# Patient Record
Sex: FEMALE | Race: WHITE
[De-identification: names, ages, dates, MRNs, and addresses within clinical notes are randomized per-mention and may not be internally consistent; named-entity substitution may affect disease eponyms.]

---

## 2018-05-20 NOTE — ED GU-FEMALE
General


Chief Complaint:  -Female


Stated Complaint:  LOWER ABD PAIN


Nursing Triage Note:  


TO ROOM C/O LOW ABD PAIN ONSET THIS AM


Nursing Sepsis Screen:  No Definite Risk


Source:  patient


Exam Limitations:  no limitations





History of Present Illness


Date Seen by Provider:  May 20, 2018


Time Seen by Provider:  10:06


Initial Comments


The patient is a 28-year-old white female who presents with chief complaint of 

low abdominal pain.  The patient reports that this began early this morning.  

She states that this is the worst pain that she ever had.  It is both sided but 

worse on the right.  There is no past history of kidney stones.  She reports 

she had urinary tract infections when pregnant but not otherwise.


Timing/Duration:  this morning


Severity/Quality:  severe


Location:  suprapubic, right flank, left flank, generalized flank


Activities at Onset:  none





Allergies and Home Medications


Allergies


Coded Allergies:  


     No Known Drug Allergies (Unverified , 18)





Home Medications


No Active Prescriptions or Reported Meds





Patient Home Medication List


Home Medication List Reviewed:  Yes





Review of Systems


Constitutional:  see HPI


EENTM:  no symptoms reported


Respiratory:  no symptoms reported


Cardiovascular:  no symptoms reported


Gastrointestinal:  no symptoms reported


Genitourinary:  see HPI


Musculoskeletal:  no symptoms reported


Skin:  no symptoms reported


Psychiatric/Neurological:  No Symptoms Reported


Endocrine:  No Symptoms Reported


Hematologic/Lymphatic:  No Symptoms Reported





Past Medical-Social-Family Hx


Patient Social History


Alcohol Use:  Denies Use


Recreational Drug Use:  No


Smoking Status:  Never a Smoker


Recent Foreign Travel:  No


Contact w/Someone Who Travel:  No


Recent Infectious Disease Expo:  No





Past Medical History


Surgeries:  Yes


 Section


Respiratory:  No


Cardiac:  No


Neurological:  No


Genitourinary:  No


Gastrointestinal:  No


Musculoskeletal:  No


Endocrine:  No


HEENT:  No


Cancer:  No


Integumentary:  No





Physical Exam


Vital Signs





Vital Signs - First Documented








 18





 09:00


 


Temp 96.7


 


Pulse 72


 


Resp 18


 


B/P (MAP) 143/95 (111)


 


Pulse Ox 99


 


O2 Delivery Room Air





Capillary Refill : Less Than 3 Seconds


General Appearance:  mild distress


HEENT:  normal ENT inspection


Neck:  full range of motion


Cardiovascular:  normal peripheral pulses, regular rate, rhythm, no edema, no 

gallop, no JVD, no murmur


Respiratory:  chest non-tender


Gastrointestinal:  normal bowel sounds, non tender, no organomegaly, other (

obese)


Back:  normal inspection, no CVA tenderness, no vertebral tenderness, CVA 

tenderness (R), CVA tenderness (L)


Extremities:  normal range of motion, non-tender, normal inspection, no pedal 

edema, no calf tenderness, normal capillary refill, pelvis stable


Neurologic/Psychiatric:  CNs II-XII nml as tested, no motor/sensory deficits, 

alert, normal mood/affect, oriented x 3


Skin:  normal color, warm/dry, cyanosis, cool, diaphoresis, pallor


Lymphatic:  no adenopathy


Comments


Percussion over the right kidney produced rather prompt and impressive





Progress/Results/Core Measures


Suspected Sepsis


Recent Fever Within 48 Hours:  No


Infection Criteria Present:  None


New/Unexplained  Altered Menta:  No


Sepsis Screen:  No Definite Risk


SIRS


Temperature:96.7 


Pulse: 72 


Respiratory Rate: 18


 


Laboratory Tests


18 09:45: White Blood Count 9.8


Blood Pressure 143 /95 


Mean: 111


 


Laboratory Tests


18 09:45: Platelet Count 287


18 10:34: 


Creatinine 0.71, Total Bilirubin 0.9








Results/Orders


Lab Results





Laboratory Tests








Test


 18


09:25 18


09:45 18


10:34 Range/Units


 


 


Urine Color YELLOW     


 


Urine Clarity CLEAR     


 


Urine pH 5    5-9  


 


Urine Specific Gravity 1.030 H   1.016-1.022  


 


Urine Protein 2+ H   NEGATIVE  


 


Urine Glucose (UA) NEGATIVE    NEGATIVE  


 


Urine Ketones 4+ H   NEGATIVE  


 


Urine Nitrite NEGATIVE    NEGATIVE  


 


Urine Bilirubin 1+ H   NEGATIVE  


 


Urine Urobilinogen NORMAL    NORMAL  MG/DL


 


Urine Leukocyte Esterase 2+ H   NEGATIVE  


 


Urine RBC (Auto) 5+ H   NEGATIVE  


 


Urine RBC 5-10 H    /HPF


 


Urine WBC 2-5     /HPF


 


Urine Squamous Epithelial


Cells 10-25 H


 


 


  /HPF





 


Urine Crystals NONE     /LPF


 


Urine Bacteria MODERATE H    /HPF


 


Urine Casts NONE     /LPF


 


Urine Mucus SMALL H    /LPF


 


Urine Culture Indicated NO     


 


White Blood Count


 


 9.8 


 


 4.3-11.0


10^3/uL


 


Red Blood Count


 


 4.61 


 


 4.35-5.85


10^6/uL


 


Hemoglobin  13.7   11.5-16.0  G/DL


 


Hematocrit  40   35-52  %


 


Mean Corpuscular Volume  87   80-99  FL


 


Mean Corpuscular Hemoglobin  30   25-34  PG


 


Mean Corpuscular Hemoglobin


Concent 


 34 


 


 32-36  G/DL





 


Red Cell Distribution Width  13.2   10.0-14.5  %


 


Platelet Count


 


 287 


 


 130-400


10^3/uL


 


Mean Platelet Volume  9.6   7.4-10.4  FL


 


Neutrophils (%) (Auto)  80 H  42-75  %


 


Lymphocytes (%) (Auto)  15   12-44  %


 


Monocytes (%) (Auto)  5   0-12  %


 


Eosinophils (%) (Auto)  0   0-10  %


 


Basophils (%) (Auto)  0   0-10  %


 


Neutrophils # (Auto)  7.8   1.8-7.8  X 10^3


 


Lymphocytes # (Auto)  1.5   1.0-4.0  X 10^3


 


Monocytes # (Auto)  0.5   0.0-1.0  X 10^3


 


Eosinophils # (Auto)


 


 0.0 


 


 0.0-0.3


10^3/uL


 


Basophils # (Auto)


 


 0.0 


 


 0.0-0.1


10^3/uL


 


Sodium Level   138  135-145  MMOL/L


 


Potassium Level   3.7  3.6-5.0  MMOL/L


 


Chloride Level   107    MMOL/L


 


Carbon Dioxide Level   19 L 21-32  MMOL/L


 


Anion Gap   12  5-14  MMOL/L


 


Blood Urea Nitrogen   12  7-18  MG/DL


 


Creatinine


 


 


 0.71 


 0.60-1.30


MG/DL


 


Estimat Glomerular Filtration


Rate 


 


 > 60 


  





 


BUN/Creatinine Ratio   17   


 


Glucose Level   130 H   MG/DL


 


Calcium Level   9.1  8.5-10.1  MG/DL


 


Total Bilirubin   0.9  0.1-1.0  MG/DL


 


Aspartate Amino Transf


(AST/SGOT) 


 


 14 


 5-34  U/L





 


Alanine Aminotransferase


(ALT/SGPT) 


 


 11 


 0-55  U/L





 


Alkaline Phosphatase   54    U/L


 


Total Protein   7.1  6.4-8.2  GM/DL


 


Albumin   4.0  3.2-4.5  GM/DL








My Orders





Orders - MELVIN IVEY MD


Urine Pregnancy Bedside (18 09:15)


Cbc With Automated Diff (18 09:15)


Ua Culture If Indicated (18 09:15)


Comprehensive Metabolic Panel (18 10:02)


Ct Abd/Pelvis Wo(Kidney Stone) (18 10:02)


Ketorolac Injection (Toradol Injection) (18 10:15)


Ondansetron Injection (Zofran Injectio (18 10:15)





Medications Given in ED





Current Medications








 Medications  Dose


 Ordered  Sig/Elaine


 Route  Start Time


 Stop Time Status Last Admin


Dose Admin


 


 Ketorolac


 Tromethamine  30 mg  ONCE  ONCE


 IVP  18 10:15


 18 10:16 DC 18 10:25


30 MG


 


 Ondansetron HCl  8 mg  ONCE  ONCE


 IVP  18 10:15


 18 10:16 DC 18 10:25


8 MG








Vital Signs/I&O











 18





 09:00


 


Temp 96.7


 


Pulse 72


 


Resp 18


 


B/P (MAP) 143/95 (111)


 


Pulse Ox 99


 


O2 Delivery Room Air





Capillary Refill : Less Than 3 Seconds








Blood Pressure Mean:  111











Point of Care Testing


Urine Pregnancy-Bedside:  Negative





Departure


Impression





 Primary Impression:  


 right ureterolithiasis


Disposition:   HOME, SELF-CARE


Condition:  Stable/Unchanged





Departure-Patient Inst.


Decision time for Depature:  11:28





Add. Discharge Instructions:  


All discharge instructions reviewed with patient and/or family. Voiced 

understanding.


Strain all urine.


Medication as prescribed.


Call your provider in a.m. if stone has not passed.  He can arrange for you to 

see a urologist.


Scripts


Hydrocodone Bit/Acetaminophen (LORTAB  7.5 MG TABLET) 1 Ea Tablet


1 EA PO 4 times a day, #14 TAB


   Prov: MELVIN IVEY MD         18











MELVIN IVEY MD May 20, 2018 10:10

## 2018-05-20 NOTE — DIAGNOSTIC IMAGING REPORT
Clinical indication: Patient with right lower quadrant pain

radiating to posteriorly with nausea vomiting and diarrhea x6

hours.



Exam: CT exam of the abdomen and pelvis is performed without IV

or oral contrast using stone protocol. Coronal and sagittal

reformatted images are created.



Comparisons: None.



Findings:



Visualized lung bases: Unremarkable.



Liver: Unremarkable as visualized.

Gallbladder: Unremarkable.

Pancreas: Unremarkable as visualized.

Spleen: Unremarkable as visualized.



Adrenal glands: Unremarkable.

Kidneys/ ureters: There is a 2 mm calcification in the medial

lower right pelvis region which is in the expected region of the

distal right ureter. This may represent a stone in the distal

right ureter versus an adjacent phlebolith.



There is note of moderate right hydroureteronephrosis and mild

fat stranding adjacent to the right ureter. Otherwise both

kidneys are unremarkable. There are no other urinary tract stones

seen. There is no perinephric fat stranding.



Aorta: Unremarkable as visualized.



Intraabdominal/ retroperitoneal contents: Unremarkable.

Intestines: Unremarkable as visualized.

Appendix: Unremarkable.



Bladder: Unremarkable as visualized.

Pelvic organs: There is a roughly 2.6 cm cystic structure in

right adnexa region. Otherwise visualized portion of the uterus

and adnexal areas is unremarkable. There is no significant free

fluid in the pelvis. There is no intra-abdominal free air.

Extra abdominal/ pelvis regions: Unremarkable.



Abdominal wall: Small fat-containing umbilical hernia seen.



Bones: Unremarkable.



Impression: 

1:  There is a 2 mm calcification in the medial right low pelvis

region which is in the expected region of the distal right

ureter. This may represent a stone in the distal right ureter.

There is note of moderate right hydroureteronephrosis and mild

periureteral fat stranding.



2:  There is a roughly 2.6 cm cyst in right adnexa region. Pelvic

ultrasound in 6 weeks is suggested to evaluate for evolution and

further evaluate.



Dictated by: 



  Dictated on workstation # [id number]

## 2018-11-29 NOTE — XMS REPORT
MU2 Ambulatory Summary

 Created on: 2016



Radha Aviles

External Reference #: [id number]

: 1990

Sex: Female



Demographics







 Address  [address]

[place]  [address]

 

 Home Phone  [phone]

 

 Preferred Language  English

 

 Marital Status  Never 

 

 Sabianism Affiliation  Unknown

 

 Race  White

 

 Ethnic Group  Not  or 





Author







 Author  Velma Mendieta

 

 [facility]

 

 Address  [address]

[place], [place]  [address]



 

 Phone  [phone]







Care Team Providers







 Care Team Member Name  Role  Phone

 

 Velma Mendieta  PCP  Unavailable







Allergies and Adverse Reactions







 Name  Reaction  Notes

 

 NO KNOWN DRUG ALLERGIES      







Plan of Treatment







 Planned Activity  Comments  Planned Date  Planned Time  Plan/Goal

 

 CYTOPATH C/V THIN LAYER     2016  12:00 AM   

 

 N.GONORRHOEAE DNA AMP PROB     2016  12:00 AM   

 

 CHLAMYDIA CULTURE     2016  12:00 AM   

 

 HIV-1ANTIBODY     2016  12:00 AM   

 

 URINALYSIS AUTO W/SCOPE     2016  12:00 AM   

 

 OBSTETRIC PANEL     2016  12:00 AM   

 

 GLUCOSE TOLERANCE TEST (GTT)     2016  12:00 AM   







Medications







  

 

 Name  Start Date  Expiration Date  SIG  Comments

 

 Nata[place] 60 mg iron-1 mg oral tablet  4/15/2010  3/11/2011  take 1 tablet by 
oral route once daily for 30 days   

 

 Diflucan 150 mg oral tablet  2010  take 1 tablet (150 mg) by 
oral route once  for 1 days   

 

 Premarin 1.25 mg oral tablet  2011  take 1 tablet by oral route 
daily for 10 days   

 

 doxycycline hyclate 100 mg oral tablet  7/15/2011  2011  take 1 tablet (
100 mg) by oral route every 12 hours for 10 days   

 

 ibuprofen 800 mg oral tablet  2012  take 1 tablet by oral route 
3 times a day for 30 days   

 

 Bactrim -160 mg oral tablet  2012  take 1 tablet by oral 
route every 12 hours for 7 days   









 Discontinued 

 

 Name  Start Date  Discontinued Date  SIG  Comments

 

 atenolol 25 mg oral tablet     2011  take 1/2 tablet by oral route QD   

 

 Medrol (Antwan) 4 mg oral tablets,dose pack  2012  3/26/2012  take as 
directed   







Problem List







 Description  Status  Onset

 

 Prenatal Care, High Risk Pregnancy  Active  2010







Vital Signs







 Date  Time  BP-Sys(mm[Hg]  BP-Marisa(mm[Hg])  HR(bpm)  RR(rpm)  Temp  WT  HT  HC  
BMI  BSA  BMI Percentile  O2 Sat(%)

 

 2016  11:15:00 AM  130 mmHg  70 mmHg  80 bpm  20 rpm     214 lbs  64 in  
   36.73 kg/m2  2.09 m2      

 

 3/26/2012  10:30:00 AM  126 mmHg  62 mmHg  106 bpm  18 rpm  96.8 F  205 lbs  
64 in     35.1878 kg/m  2.0491 m     97 %

 

 2012  1:22:00 PM  120 mmHg  68 mmHg  66 bpm  18 rpm  97 F  202.375 lbs  
64 in     34.74 kg/m2  2.04 m2      

 

 2012  1:44:00 PM  122 mmHg  64 mmHg  68 bpm  18 rpm  98.2 F  198.125 lbs  
64 in     34.0077 kg/m  2.0145 m      

 

 1/3/2012  9:40:00 AM  103 mmHg  77 mmHg  64 bpm  18 rpm  97.7 F  197.125 lbs  
64 in     33.84 kg/m2  2.01 m2      

 

 2011  9:06:00 AM  118 mmHg  68 mmHg  74 bpm     97.4 F  189 lbs          
        

 

 7/15/2011  10:06:00 AM  126 mmHg  70 mmHg  73 bpm  20 rpm  97.9 F  180.5 lbs  
64 in     30.98 kg/m2  1.92 m2  0 %  99 %

 

 2011  8:56:00 AM  96 mmHg  68 mmHg  66 bpm  20 rpm  97.5 F  177.5 lbs  64 
in     30.4675 kg/m  1.9067 m  0 %  98 %

 

 12/15/2010  10:43:00 AM  104 mmHg  64 mmHg  48 bpm     97.8 F  150 lbs        
          

 

 2010  9:15:00 AM  112 mmHg  72 mmHg  49 bpm     98 F  149 lbs  64 in     
25.5755 kg/m  1.747 m  81.5 %   

 

 2010  8:54:00 AM  117 mmHg  65 mmHg  73 bpm  20 rpm  97.8 F  160.5 lbs   
               

 

 2010  9:42:00 AM  134 mmHg  66 mmHg  66 bpm        161 lbs               
   

 

 4/15/2010  4:02:00 PM  135 mmHg  71 mmHg  77 bpm        162 lbs  64 in     
27.81 kg/m2  1.82 m2  89.9 %   







Social History







 Name  Description  Comments

 

 History of tobacco usage     Quit smoking in January - was smoking 1-2cig/day

 

 denies alcohol use      

 

 Tobacco  Current every day smoker   







History of Procedures







 Date Ordered  Description  Order Status

 

 2011 12:00 AM  URINE PREGNANCY TEST  Reviewed

 

 2011 12:00 AM  IMMUNIZATION ADMIN  Reviewed

 

 2011 12:00 AM  HPV VACCINE 4 VALENT IM  Reviewed

 

 2012 12:00 AM  X-RAY EXAM KNEE 4 OR MORE  Returned

 

 4/15/2010 12:00 AM  OBSTETRIC PANEL  Reviewed

 

 4/15/2010 12:00 AM  HIV-1ANTIBODY  Reviewed

 

 4/15/2010 12:00 AM  URINALYSIS NONAUTO W/SCOPE  Reviewed

 

 4/15/2010 12:00 AM  OB US < 14 WKS SINGLE FETUS  Reviewed

 

 2010 12:00 AM  GLUCOSE TEST  Reviewed

 

 2010 12:00 AM  Rhogam  Reviewed

 

 2010 12:00 AM  Type and screen  Reviewed

 

 2010 12:00 AM  COMPLETE CBC W/AUTO DIFF WBC  Reviewed

 

 2010 12:00 AM  COMPLETE CBC W/AUTO DIFF WBC  Reviewed

 

 10/14/2010 12:00 AM  CULTURE OTHR SPECIMN AEROBIC  Reviewed

 

 12/15/2010 12:00 AM  INSERTION IMPLANON, IMPLANTABLE CONTRACEPTIVE CAPSULES  
Reviewed

 

 12/15/2010 12:00 AM  Etonogestrel (contraceptive) implant system, (Implanon)  
Reviewed

 

 7/15/2011 12:00 AM  CYTOPATH TBS C/V MANUAL  Reviewed

 

 7/15/2011 12:00 AM  CHLAMYDIA CULTURE  Reviewed

 

 7/15/2011 12:00 AM  N.GONORRHOEAE DNA AMP PROB  Reviewed

 

 7/15/2011 12:00 AM  URINE PREGNANCY TEST  Reviewed

 

 2011 12:00 AM  IMMUNIZATION ADMIN  Reviewed

 

 2011 12:00 AM  HPV VACCINE 4 VALENT IM  Reviewed







Results Summary







 Data and Description  Results

 

 2010 10:41 AM  RUBELLA 34.0 IU/mLCOLOR YELLOW APPEARANCE HAZY SPEC GRAV 
1.025 pH 7.0 PROTEIN TRACE GLUCOSE NEGATIVE KETONE 15 BILIRUBIN NEGATIVE BLOOD 
NEGATIVE NITRITE NEGATIVE LEUK SCREEN NEGATIVE CASTS/LPF NEGATIVE CRYSTALS 
TRACE AMORPH MUCOUS THRDS FEW BACTERIA NEGATIVE EPITH CELLS FEW SQUAMOUS 
TRICHOMONAS NEGATIVE YEAST NEGATIVE WBC 6.3 RBC 4.57 HGB 13.90 g/dLHCT 40.30 %
MCV 88.0 fLMCH 30.40 pgMCHC 34.50 g/dLMPV 9.20 fLPLT 258 %NEUT 65.10 %%LYMP 
28.30 %#NEUT 4.10 #LYMP 1.80 

 

 2010 3:48 PM  WBC 12.9 RBC 4.02 HGB 12.60 g/dLHCT 37.40 %MCV 93.0 fLMCH 
31.30 pgMCHC 33.70 g/dLRDW CV 14.10 %MPV 9.50 fLPLT 263 %NEUT 69.20 %%LYMP 
22.30 %%MONO 7.60 %%EOS 0.70 %%BASO 0.20 %#NEUT 8.91 #LYMP 2.88 #MONO 0.98 #EOS 
0.09 #BASO 0.03 

 

 10/22/2010 8:21 PM  COLOR YELLOW APPEARANCE CLEAR SPEC GRAV 1.010 pH 6.5 
PROTEIN NEGATIVE GLUCOSE NEGATIVE KETONE 15 BILIRUBIN NEGATIVE BLOOD NEGATIVE 
NITRITE NEGATIVE LEUK SCREEN SMALL CASTS/LPF NEGATIVE CRYSTALS NEGATIVE MUCOUS 
THRDS NEGATIVE BACTERIA 2++ EPITH CELLS 3+++ SQUAMOUS TRICHOMONAS NEGATIVE 
YEAST NEGATIVE 

 

 2010 11:58 PM  COLOR YELLOW APPEARANCE CLEAR SPEC GRAV 1.015 pH 6.5 
PROTEIN NEGATIVE GLUCOSE NEGATIVE KETONE NEGATIVE BILIRUBIN NEGATIVE BLOOD 
NEGATIVE NITRITE NEGATIVE LEUK SCREEN LARGE CASTS/LPF NEGATIVE CRYSTALS 
NEGATIVE MUCOUS THRDS NEGATIVE BACTERIA 2++ EPITH CELLS 2++ SQUAMOUS 
TRICHOMONAS NEGATIVE YEAST NEGATIVE 

 

 11/15/2010 5:30 PM  .0 IU/LURIC ACID 3.7 mg/dLGLUCOSE 66.0 mg/dLSODIUM 
137.0 mmol/LPOTASSIUM 3.10 mmol/LCHLORIDE 105.0 mmol/LCO2 21.0 mmol/LBUN 3.0 mg/
dLCREATININE 0.50 mg/dLSGOT/AST 20.0 IU/LSGPT/ALT 18.0 IU/LALK PHOS 152.0 IU/
LTOTAL PROTEIN 6.20 g/dLALBUMIN 3.40 g/dLTOTAL BILI 0.60 mg/dLCALCIUM 9.10 mg/
dLeGFR >60 mL/min/1.73 m2WBC 16.9 RBC 4.18 HGB 13.30 g/dLHCT 39.70 %MCV 95.0 
fLMCH 31.80 pgMCHC 33.50 g/dLRDW CV 14.0 %MPV 10.10 fLPLT 268 %NEUT 75.70 %%
LYMP 17.50 %%MONO 6.20 %%EOS 0.40 %%BASO 0.20 %#NEUT 12.82 #LYMP 2.97 #MONO 
1.05 #EOS 0.06 #BASO 0.03 

 

 11/15/2010 8:50 PM  PROTEIN UR 14.0 mg/dL

 

 2016 12:21 PM  Pap Smear Collected 







History Of Immunizations







 Name  Date Admin  Mfg Name  Mfg Code  Trade Name  Lot#  Route  Inj  Vis Given  
Vis Pub  CVX

 

 HPV  2011  [employer]  MSD  GARDASIL  [id number]  Intramuscular  Left 
Deltoid  2011  999

 

 HPV  2011  [employer]  MSD  GARDASIL  [id number]  Intramuscular  Left 
Deltoid  2011  62







History of Past Illness







 Name  Date of Onset  Comments

 

 Atrial Septal Defect, Ostium Secundum Type      

 

 Prenatal Care, High Risk Pregnancy  2010   

 

    Apr 15 2010  4:29PM   

 

 Prenatal Care, High Risk Pregnancy  Apr 15 2010  4:13PM   

 

    2010  9:54AM   

 

 Prenatal Care, High Risk Pregnancy  2010 10:10AM   

 

    May 13 2010  9:01AM   

 

    John 10 2010  2:45PM   

 

    2010  4:16PM   

 

 Pregnancy, First Normal  Aug 30 2010  9:14AM   

 

 Pregnancy, High Risk  Sep 13 2010  3:13PM   

 

 Group B Strep Screening,   Oct 14 2010  5:43PM   

 

 Pregnancy, High Risk  Oct 14 2010  5:43PM   

 

 Postoperative Examination Following Surgery  Dec  6 2010  9:15AM   

 

 IMPLANON  Insertion  Dec 15 2010 10:44AM   

 

 General Medical Exam, Child  2011  8:58AM   

 

 Contraceptive Management  2011  8:58AM   

 

 Routine gynecological examination  Jul 15 2011 10:03AM   

 

 Contraceptive Counseling  Jul 15 2011 10:03AM   

 

 Vaginal Discharge  Jul 15 2011 10:03AM   

 

 Irregular Menses  Jul 15 2011 10:03AM   

 

 Gardsil (HPV)  2011  8:54AM   

 

 Irregular Menses  Aug 31 2011  9:07AM   

 

 Family Planning  Aug 31 2011  9:07AM   

 

 Contraceptive Management  Andreas  3 2012  9:43AM   

 

 Local Infection  Andreas  3 2012  9:43AM   

 

 Well Child Examination  2012  1:46PM   

 

 Right knee pain  2012  1:46PM   

 

 Right knee pain  2012  1:24PM   

 

 Abscess  2012  1:24PM   

 

 Right knee pain  Mar 26 2012 10:32AM   

 

 General Medical Exam, Adult  2012  1:46PM   

 

 Pregnancy test confirmed positive  2016 11:37AM   

 

 Obesity  2016 11:37AM   







Payers







 Insurance Name  Company Name  Plan Name  Plan Number  Policy Number  Policy 
Group Number  Start Date

 

    [employer] [facility]     
[id number]     N/A

 

    [facility]     N/A







History of Encounters







 Visit Date  Visit Type  Provider

 

 2016  Office visit  Velma Mendieta APRN

 

 3/26/2012  Office visit  Ivanna Swan APRN

 

 2012  Office visit  Ivanna Swan APRN

 

 2012  Office visit  Ivanna Swan APRN

 

 1/3/2012  Office visit  Ivanna Swan APRN

 

 2011  Nurse visit  Ivanna Swan APRN

 

 2011  Nurse visit  Ivanna Swan APRN

 

 7/15/2011  Office visit  Ivanna Swan APRN

 

 2011  Office visit  Ivanna Swan APRN

 

 12/15/2010  Office visit  Willard Sorensen MD

 

 2010  Surgery  Willard Sorensen MD

 

 2010  [facility]  Willard Sorensen MD

 

 11/15/2010  Office visit  Willard Sorensen MD

 

 11/15/2010  [facility]  Willard Sorensen MD

 

 2010  Office visit  Willard Sorensen MD

 

 2010  Office visit  Willard Sorensen MD

 

 10/28/2010  Office visit  Willard Sorensen MD

 

 10/21/2010  Office visit  Willard Sorensen MD

 

 10/14/2010  Office visit  Willard Sorensen MD

 

 10/9/2010  [facility]  Willard Sorensen MD

 

 10/7/2010  Office visit  Willard Sorensen MD

 

 2010  Office visit  Willard Sorensen MD

 

 2010  Office visit  Willard Sorensen MD

 

 2010  Office visit  Willard Sorensen MD

 

 2010  Office visit  Willard Sorensen MD

 

 2010  Office visit  Willard Sorensen MD

 

 6/10/2010  Office visit  Willard Sorensen MD

 

 2010  Office visit  Willard Sorensen MD

 

 2010  Office visit  Willard Sorensen MD

 

 4/15/2010  Office visit  Willard Sorensen MD

## 2018-11-29 NOTE — XMS REPORT
Continuity of Care Document

 Created on: 2015



RADHA AVILES

External Reference #: [id number]

: 1990

Sex: Female



Demographics







 Address  [address] [place]  [address]

 

 Home Phone  [phone]

 

 Preferred Language  Unknown

 

 Marital Status  

 

 Taoist Affiliation  Unknown

 

 Race  White

 

 Ethnic Group  Unknown





Author







 Author  [facility]

 

 Organization  [facility]

 

 Address  [facility]

 [address]  [address]



 

 Phone  Unavailable







Support







 Name  Relationship  Address  Phone

 

 DRE PENA MD  Caregiver  [address] [place] [address]  [phone]  Unavailable

 

 KAMALJIT AVILES  Next Of Kin  [address]  [address] [phone]







Insurance Providers







 Payer Name  Policy Number  Subscriber Name  Relationship

 

 Medicaid [place]  [id number]  Radha Aviles  18 Self / Same As Patient







Advance Directives







 Directive  Response  Recorded Date/Time

 

 Advance Directives  No  02/20/15 7:18am

 

 Living Will  No  02/20/15 7:18am

 

 Health Care Proxy  No  07/04/15 1:25pm

 

 Power of  for Health Care  No  02/20/15 7:18am

 

 Who is designated as your agent/rep. to act on your behalf?  PT  02/22/15 10:
14am

 

 Organ, Tissue, or Eye Donor  No  02/20/15 7:17am

 

 Do you have a signed organ donor card?  No  02/20/15 7:17am







Chief Complaint and Reason for Visit







 Chief Complaint  ABDOMINAL PAIN

 

 Reason for Visit  [id number]







Problems

Active Problems







 Medical Problem  Onset Date  Status

 

 Renal stone  Unknown  Acute

 

 Spontaneous   Unknown  Acute

 

 Spontaneous   Unknown  Acute

 

 Spontaneous   Unknown  Acute

 

 UTI (lower urinary tract infection)  Unknown  Acute







Medications

Current Home Medications







 Medication  Dose  Units  Route  Directions  Days/Qty  Instructions  Start Date

 

 Folic Acid 1 Mg  1  Mg  Oral  Daily        02/20/15

 

 Pnv With Ca,No.74/Iron/Fa 1 Each  1  Each  Oral           02/20/15

 

 Cephalexin Monohydrate 500 Mg  1,000  Mg  Oral  Twice A Day  40     02/20/15

 

 Acetaminophen/ Codeine #3 Tab* 1 Tab  1  Ea  Oral  Every 6 Hrs As Needed For 
Pain for Pain  15     02/22/15

 

 Acetaminophen/Hydrocodone Bitart (Lortab 5-325*) 1 Tab  1  Each  Oral  Every 4-
6 Hrs As Needed Pain as needed for Pain  20     07/04/15

 

 Tamsulosin Hcl 0.4 Mg  0.4  Mg  Oral  Daily  20     07/04/15

 

 Ondansetron* 4 Mg/Tab  4  Mg  Oral  Every 4-6 Hours As Needed as needed for 
Nausea  30     07/04/15







Social History







 Social History Problem  Response  Recorded Date/Time

 

 Smoking Status  Never smoker  2015 8:06am









 Query  Response  Start Date  Stop Date

 

 Smoking Status  Never smoker      







Hospital Discharge Instructions

No hospital discharge instructions.



Plan of Care







 Discharge Date  07/04/15 5:32pm

 

 Condition at Discharge  Stable

 

 Instructions/Education Provided  Kidney Stones (ED)

 

 Prescriptions  See Medication Section

 

 Referrals  [facility] - 







Functional Status

No functional status results.



Allergies, Adverse Reactions, Alerts

No known allergies.



Immunizations







 Name  Given  Type

 

 Hx Diphtheria, Pertussis, Tetanus Vaccination  Unknown  Historical

 

 Hx Influenza Vaccination  Y   Historical

 

 Hx Pneumococcal Vaccination  No  Historical







Vital Signs

Acute Vital Signs





 Vital  Response  Date/Time

 

 Pain Location Body Site Modifier  Upper  2015 4:50pm







Results

Laboratory Results







 Test Name  Result  Units  Flags  Reference  Collection Date/Time  Result Date/
Time  Comments

 

 White Blood Count  11.1  K/uL   H  4.8-10.8  2015 1:50pm  2015 2:
28pm   

 

 Red Blood Count  4.92  M/uL     4.20-5.40  2015 1:50pm  2015 2:
28pm   

 

 Hemoglobin  13.4  gm/dL     12.0-16.0  2015 1:50pm  2015 2:28pm   

 

 Hematocrit  41.1  %     37.0-47.0  2015 1:50pm  2015 2:28pm   

 

 Mean Corpuscular Volume  83.7  fL     81.0-99.0  2015 1:50pm  2015 
2:28pm   

 

 Mean Corpuscular Hemoglobin  27.3  pg     27.0-31.0  2015 1:50pm  2015 2:28pm   

 

 Mean Corpuscular Hemoglobin Concent  32.7  g/dL     30.0-37.0  2015 1:
50pm  2015 2:28pm   

 

 Red Cell Distribution Width  15.1  %   H  11.5-14.5  2015 1:50pm  2015 2:28pm   

 

 Platelet Count  282  K/uL     130-400  2015 1:50pm  2015 2:28pm   

 

 Mean Platelet Volume  9.7  fL     7.4-10.4  2015 1:50pm  2015 2:
28pm   

 

 Neutrophils (%) (Auto)  74.5  %     42.2-75.2  2015 1:50pm  2015 2:
28pm   

 

 Lymphocytes (%) (Auto)  18.9  %   L  20.5-51.1  2015 1:50pm  2015 2
:28pm   

 

 Monocytes (%) (Auto)  5.2  %     1.7-9.3  2015 1:50pm  2015 2:28pm
   

 

 Eosinophils (%) (Auto)  0.7  %     0-3  2015 1:50pm  2015 2:28pm   

 

 Basophils (%) (Auto)  0.7  %     0.0-1.0  2015 1:50pm  2015 2:28pm
   

 

 Neutrophils # (Auto)  8.3  K/uL   H  2.0-6.9  2015 1:50pm  2015 2:
28pm   

 

 Lymphocytes # (Auto)  2.1  K/uL     1.2-3.4  2015 1:50pm  2015 2:
28pm   

 

 Monocytes # (Auto)  0.6  K/uL     0.1-0.6  2015 1:50pm  2015 2:
28pm   

 

 Eosinophils # (Auto)  0.1  K/uL     0.0-0.7  2015 1:50pm  2015 2:
28pm   

 

 Basophils # (Auto)  0.1  K/uL     0.0-0.2  2015 1:50pm  2015 2:
28pm   

 

 Random Glucose  94  mg/dL       2015 2:35pm  2015 3:11pm   

 

 Blood Urea Nitrogen  16  mg/dL     7-18  2015 2:35pm  2015 3:11pm 
  

 

 Creatinine  1.2  mg/dL   H  0.6-1.0  2015 2:35pm  2015 3:11pm   

 

 Sodium Level  141  mEq/L     136-145  2015 2:35pm  2015 3:11pm   

 

 Potassium Level  3.5  mEq/L     3.5-5.0  2015 2:35pm  2015 3:11pm 
  

 

 Chloride Level  107  mEq/L       2015 2:35pm  2015 3:11pm   

 

 Carbon Dioxide Level  20.8  mEq/L   L  21-32  2015 2:35pm  2015 3:
11pm   

 

 Calcium Level  7.9  mg/dL   L  8.8-10.5  2015 2:35pm  2015 3:11pm 
  

 

 Total Protein  6.7  gm/dL     6.4-8.2  2015 2:35pm  2015 3:11pm   

 

 Albumin  3.6  gm/dL     3.4-5.0  2015 2:35pm  2015 3:11pm   

 

 Total Bilirubin  0.70  mg/dL     0.00-1.00  2015 2:35pm  2015 3:
11pm   

 

 Aspartate Amino Transf (AST/SGOT)  11  U/L   L  15-37  2015 2:35pm   3:11pm   

 

 Alanine Aminotransferase (ALT/SGPT)  15  U/L     12-78  2015 2:35pm  07/
2015 3:11pm   

 

 Total Alkaline Phosphatase  68  U/L       2015 2:2015 3
:11pm   

 

 Urine Color  YELLOW        YELLOW  2015 3:15pm  2015 3:32pm   

 

 Urine Appearance  CLEAR        CLEAR  2015 3:15pm  2015 3:32pm   

 

 Urine Glucose (UA)  NEGATIVE  mg/dL     NEGATIVE  2015 3:15pm  07/04/
2015 3:32pm   

 

 Urine Bilirubin  NEGATIVE        NEGATIVE  2015 3:2015 3:
32pm   

 

 Urine Ketones  1+ (15 mg/dl)  mg/dL   H  NEGATIVE  2015 3:15pm  07/04/
2015 3:32pm   

 

 Urine Specific Gravity  >=1.030      H  1.010-1.025  2015 3:15pm  2015 3:32pm   

 

 Urine Occult Blood  3+ (Large)      H  NEGATIVE  2015 3:15pm  2015 
3:32pm  URINE CULTURE ORDERED PER MEDICAL STAFF-APPROVED PROTOCOL 

FOR LAB. 

 

 Urine pH  5.5        5.0-8.0  2015 3:15pm  2015 3:32pm   

 

 Urine Protein  TRACE  mg/dL   H  NEGATIVE  2015 3:15pm  2015 3:
32pm   

 

 Urine Urobilinogen  0.2 mg/dL  E.U./dL     0.2-1.0  2015 3:15pm  2015 3:32pm   

 

 Urine Nitrate  NEGATIVE        NEGATIVE  2015 3:15pm  2015 3:32pm 
  

 

 Urine Leukocyte Esterase  TRACE      H  NEGATIVE  2015 3:15pm  2015 3:32pm   

 

 Urine RBC  10-15  /hpf   H  0  2015 3:15pm  2015 3:40pm   

 

 Urine WBC  1-2  /hpf     0-4  2015 3:15pm  2015 3:40pm   

 

 Urine Squamous Epithelial Cells  TOO NUMEROUS TO CNT  /hpf     0-1  2015 
3:15pm  2015 3:40pm   

 

 Urine Bacteria  TRACE        NEGATIVE  2015 3:15pm  2015 3:40pm   

 

 Human Chorionic Gonadotropin, Qual  NEGATIVE        NEG  2015 1:50pm  2015 3:02pm   

 

 Glomerular Filtration Rate Calc  58.7  mL/min        2015 2:35pm  2015 3:11pm   







Procedures







 Procedure  Status  Date  Provider(s)

 

 Computed tomography of abdomen and pelvis with contrast  Completed  07/04/15  
DRE PENA MD







Encounters







 Encounter  Location  Arrival/Admit Date  Discharge/Depart Date  Attending 
Provider

 

 Departed Emergency Room  [place]  07/04/15 1:26pm  07/04/15 5:32pm  DRE PENA MD











 Recent Diagnosis

## 2018-11-29 NOTE — XMS REPORT
MU2 Ambulatory Summary

 Created on: 10/24/2016



Radha Aviles

External Reference #: [id number]

: 1990

Sex: Female



Demographics







 Address  [address] [place] [address] [address]

[place]  [address]

 

 Home Phone  [phone]

 

 Preferred Language  English

 

 Marital Status  

 

 Rastafari Affiliation  Unknown

 

 Race  White

 

 Ethnic Group  Not  or 





Author







 Author  Antonella Ventura  [facility]

 

 Address  [address]

[place], [place]  [address]



 

 Phone  [phone]







Care Team Providers







 Care Team Member Name  Role  Phone

 

 Antonella Ventura  PCP  Unavailable







Allergies and Adverse Reactions







 Name  Reaction  Notes

 

 NO KNOWN DRUG ALLERGIES      







Plan of Treatment







 Planned Activity  Comments  Planned Date  Planned Time  Plan/Goal

 

 CYTOPATH C/V THIN LAYER     10/24/2016  12:00 AM   







Medications







 Active 

 

 Name  Start Date  Estimated Completion Date  SIG  Comments

 

 ferrous sulfate 325 mg (65 mg iron) oral tablet        take 1 tablet by oral 
route 2 times a day   

 

 docusate sodium 100 mg oral capsule        take 1 capsule (100 mg) by oral 
route once daily   

 

 Prenatal Vitamin oral tablet        take 1 tablet by oral route once daily   









  

 

 Name  Start Date  Expiration Date  SIG  Comments

 

 NataFort 60 mg iron-1 mg oral tablet  4/15/2010  3/11/2011  take 1 tablet by 
oral route once daily for 30 days   

 

 Diflucan 150 mg oral tablet  2010  take 1 tablet (150 mg) by 
oral route once  for 1 days   

 

 Premarin 1.25 mg oral tablet  2011  take 1 tablet by oral route 
daily for 10 days   

 

 doxycycline hyclate 100 mg oral tablet  7/15/2011  2011  take 1 tablet (
100 mg) by oral route every 12 hours for 10 days   

 

 ibuprofen 800 mg oral tablet  2012  take 1 tablet by oral route 
3 times a day for 30 days   

 

 Bactrim -160 mg oral tablet  2012  take 1 tablet by oral 
route every 12 hours for 7 days   

 

 Macrobid 100 mg oral capsule  2016  take 1 capsule (100 mg) by 
oral route 2 times per day with food for 7 days   









 Discontinued 

 

 Name  Start Date  Discontinued Date  SIG  Comments

 

 atenolol 25 mg oral tablet     2011  take 1/2 tablet by oral route QD   

 

 Medrol (Antwan) 4 mg oral tablets,dose pack  2012  3/26/2012  take as 
directed   

 

 oxycodone-acetaminophen 5-325 mg oral tablet     2016  take 1 tablet by 
oral route every 6 hours as needed   

 

 Norco 5-325 mg oral tablet  2016  10/24/2016  take 1 tablet by oral route 
every 6 hours as needed for pain   







Problem List







 Description  Status  Onset

 

 Prenatal Care, High Risk Pregnancy  Active  2010







Vital Signs







 Date  Time  BP-Sys(mm[Hg]  BP-Marisa(mm[Hg])  HR(bpm)  RR(rpm)  Temp  WT  HT  HC  
BMI  BSA  BMI Percentile  O2 Sat(%)

 

 10/24/2016  10:44:00 AM  121 mmHg  75 mmHg  55 bpm     98.6 F  168 lbs  64 in 
    28.84 kg/m2  1.85 m2      

 

 2016  9:20:00 AM  131 mmHg  88 mmHg  81 bpm     97.4 F                   
  

 

 2016  3:19:00 PM  121 mmHg  59 mmHg  91 bpm        178 lbs  64 in     
30.55 kg/m2  1.91 m2      

 

 2016  11:15:00 AM  130 mmHg  70 mmHg  80 bpm  20 rpm     214 lbs  64 in  
   36.73 kg/m2  2.0936 m      

 

 3/26/2012  10:30:00 AM  126 mmHg  62 mmHg  106 bpm  18 rpm  96.8 F  205 lbs  
64 in     35.1878 kg/m  2.05 m2     97 %

 

 2012  1:22:00 PM  120 mmHg  68 mmHg  66 bpm  18 rpm  97 F  202.375 lbs  
64 in     34.74 kg/m2  2.036 m      

 

 2012  1:44:00 PM  122 mmHg  64 mmHg  68 bpm  18 rpm  98.2 F  198.125 lbs  
64 in     34.0077 kg/m  2.01 m2      

 

 1/3/2012  9:40:00 AM  103 mmHg  77 mmHg  64 bpm  18 rpm  97.7 F  197.125 lbs  
64 in     33.84 kg/m2  2.0094 m      

 

 2011  9:06:00 AM  118 mmHg  68 mmHg  74 bpm     97.4 F  189 lbs          
        

 

 7/15/2011  10:06:00 AM  126 mmHg  70 mmHg  73 bpm  20 rpm  97.9 F  180.5 lbs  
64 in     30.98 kg/m2  1.9228 m  0 %  99 %

 

 2011  8:56:00 AM  96 mmHg  68 mmHg  66 bpm  20 rpm  97.5 F  177.5 lbs  64 
in     30.4675 kg/m  1.91 m2  0 %  98 %

 

 12/15/2010  10:43:00 AM  104 mmHg  64 mmHg  48 bpm     97.8 F  150 lbs        
          

 

 2010  9:15:00 AM  112 mmHg  72 mmHg  49 bpm     98 F  149 lbs  64 in     
25.5755 kg/m  1.75 m2  81.5 %   

 

 2010  8:54:00 AM  117 mmHg  65 mmHg  73 bpm  20 rpm  97.8 F  160.5 lbs   
               

 

 2010  9:42:00 AM  134 mmHg  66 mmHg  66 bpm        161 lbs               
   

 

 4/15/2010  4:02:00 PM  135 mmHg  71 mmHg  77 bpm        162 lbs  64 in     
27.81 kg/m2  1.82 m2  89.9 %   







Social History







 Name  Description  Comments

 

 History of tobacco usage     Quit smoking in January -  smoking 1-2cig/day

 

 denies alcohol use      

 

 Tobacco  Former smoker   







History of Procedures







 Date Ordered  Description  Order Status

 

 2011 12:00 AM  URINE PREGNANCY TEST  Reviewed

 

 2011 12:00 AM  IMMUNIZATION ADMIN  Reviewed

 

 2011 12:00 AM  HPV VACCINE 4 VALENT IM  Reviewed

 

 2016 12:00 AM  CYTOPATH C/V THIN LAYER  Returned

 

 2016 12:00 AM  SPECIMEN HANDLING OFFICE-LAB  Reviewed

 

 2016 12:00 AM  N.GONORRHOEAE DNA AMP PROB  Returned

 

 2016 12:00 AM  CHLAMYDIA CULTURE  Returned

 

 2016 12:00 AM  HIV-1ANTIBODY  Returned

 

 2016 12:00 AM  URINALYSIS AUTO W/SCOPE  Returned

 

 2016 12:00 AM  OBSTETRIC PANEL  Returned

 

 2016 12:00 AM  GLUCOSE TOLERANCE TEST (GTT)  Returned

 

 3/28/2016 12:00 AM  ALPHA-FETOPROTEIN SERUM  Returned

 

 3/28/2016 12:00 AM  CHORIONIC GONADOTROPIN TEST  Returned

 

 3/28/2016 12:00 AM  CHORIONIC GONADOTROPIN ASSAY  Returned

 

 5/3/2016 12:00 AM  OB US >/=14 WKS SNGL FETUS  Returned

 

 2016 12:00 AM  RH IG FULL-DOSE IM  Returned

 

 2016 12:00 AM  Type and screen  Returned

 

 2016 12:00 AM  GLUCOSE TOLERANCE TEST (GTT)  Returned

 

 2016 12:00 AM  COMPLETE CBC W/AUTO DIFF WBC  Returned

 

 2012 12:00 AM  X-RAY EXAM KNEE 4 OR MORE  Returned

 

 2016 12:00 AM  TDAP VACCINE 7 YRS/> IM  Reviewed

 

 2016 12:00 AM  IMMUNIZATION ADMIN  Reviewed

 

 2016 12:00 AM  CULTURE SCREEN ONLY  Returned

 

 2016 12:00 AM  OB US LIMITED FETUS(S)  Returned

 

 2016 12:00 AM  OB US LIMITED FETUS(S)  Reviewed

 

 4/15/2010 12:00 AM  OBSTETRIC PANEL  Reviewed

 

 4/15/2010 12:00 AM  HIV-1ANTIBODY  Reviewed

 

 4/15/2010 12:00 AM  URINALYSIS NONAUTO W/SCOPE  Reviewed

 

 4/15/2010 12:00 AM  OB US < 14 WKS SINGLE FETUS  Reviewed

 

 2010 12:00 AM  GLUCOSE TEST  Reviewed

 

 2010 12:00 AM  Rhogam  Reviewed

 

 2010 12:00 AM  Type and screen  Reviewed

 

 2010 12:00 AM  COMPLETE CBC W/AUTO DIFF WBC  Reviewed

 

 2010 12:00 AM  COMPLETE CBC W/AUTO DIFF WBC  Reviewed

 

 10/14/2010 12:00 AM  CULTURE OTHR SPECIMN AEROBIC  Reviewed

 

 12/15/2010 12:00 AM  INSERTION IMPLANON, IMPLANTABLE CONTRACEPTIVE CAPSULES  
Reviewed

 

 12/15/2010 12:00 AM  Etonogestrel (contraceptive) implant system, (Implanon)  
Reviewed

 

 7/15/2011 12:00 AM  CYTOPATH TBS C/V MANUAL  Reviewed

 

 7/15/2011 12:00 AM  CHLAMYDIA CULTURE  Reviewed

 

 7/15/2011 12:00 AM  N.GONORRHOEAE DNA AMP PROB  Reviewed

 

 7/15/2011 12:00 AM  URINE PREGNANCY TEST  Reviewed

 

 2011 12:00 AM  IMMUNIZATION ADMIN  Reviewed

 

 2011 12:00 AM  HPV VACCINE 4 VALENT IM  Reviewed







Results Summary







 Data and Description  Results

 

 2010 10:41 AM  RUBELLA 34.0 IU/mLCOLOR YELLOW APPEARANCE HAZY SPEC GRAV 
1.025 pH 7.0 PROTEIN TRACE GLUCOSE NEGATIVE KETONE 15 BILIRUBIN NEGATIVE BLOOD 
NEGATIVE NITRITE NEGATIVE LEUK SCREEN NEGATIVE CASTS/LPF NEGATIVE CRYSTALS 
TRACE AMORPH MUCOUS THRDS FEW BACTERIA NEGATIVE EPITH CELLS FEW SQUAMOUS 
TRICHOMONAS NEGATIVE YEAST NEGATIVE WBC 6.3 RBC 4.57 HGB 13.90 g/dLHCT 40.30 %
MCV 88.0 fLMCH 30.40 pgMCHC 34.50 g/dLMPV 9.20 fLPLT 258 %NEUT 65.10 %%LYMP 
28.30 %#NEUT 4.10 #LYMP 1.80 

 

 2010 3:48 PM  WBC 12.9 RBC 4.02 HGB 12.60 g/dLHCT 37.40 %MCV 93.0 fLMCH 
31.30 pgMCHC 33.70 g/dLRDW CV 14.10 %MPV 9.50 fLPLT 263 %NEUT 69.20 %%LYMP 
22.30 %%MONO 7.60 %%EOS 0.70 %%BASO 0.20 %#NEUT 8.91 #LYMP 2.88 #MONO 0.98 #EOS 
0.09 #BASO 0.03 

 

 10/22/2010 8:21 PM  COLOR YELLOW APPEARANCE CLEAR SPEC GRAV 1.010 pH 6.5 
PROTEIN NEGATIVE GLUCOSE NEGATIVE KETONE 15 BILIRUBIN NEGATIVE BLOOD NEGATIVE 
NITRITE NEGATIVE LEUK SCREEN SMALL CASTS/LPF NEGATIVE CRYSTALS NEGATIVE MUCOUS 
THRDS NEGATIVE BACTERIA 2++ EPITH CELLS 3+++ SQUAMOUS TRICHOMONAS NEGATIVE 
YEAST NEGATIVE 

 

 2010 11:58 PM  COLOR YELLOW APPEARANCE CLEAR SPEC GRAV 1.015 pH 6.5 
PROTEIN NEGATIVE GLUCOSE NEGATIVE KETONE NEGATIVE BILIRUBIN NEGATIVE BLOOD 
NEGATIVE NITRITE NEGATIVE LEUK SCREEN LARGE CASTS/LPF NEGATIVE CRYSTALS 
NEGATIVE MUCOUS THRDS NEGATIVE BACTERIA 2++ EPITH CELLS 2++ SQUAMOUS 
TRICHOMONAS NEGATIVE YEAST NEGATIVE 

 

 11/15/2010 5:30 PM  .0 IU/LURIC ACID 3.7 mg/dLGLUCOSE 66.0 mg/dLSODIUM 
137.0 mmol/LPOTASSIUM 3.10 mmol/LCHLORIDE 105.0 mmol/LCO2 21.0 mmol/LBUN 3.0 mg/
dLCREATININE 0.50 mg/dLSGOT/AST 20.0 IU/LSGPT/ALT 18.0 IU/LALK PHOS 152.0 IU/
LTOTAL PROTEIN 6.20 g/dLALBUMIN 3.40 g/dLTOTAL BILI 0.60 mg/dLCALCIUM 9.10 mg/
dLeGFR >60 mL/min/1.73 m2WBC 16.9 RBC 4.18 HGB 13.30 g/dLHCT 39.70 %MCV 95.0 
fLMCH 31.80 pgMCHC 33.50 g/dLRDW CV 14.0 %MPV 10.10 fLPLT 268 %NEUT 75.70 %%
LYMP 17.50 %%MONO 6.20 %%EOS 0.40 %%BASO 0.20 %#NEUT 12.82 #LYMP 2.97 #MONO 
1.05 #EOS 0.06 #BASO 0.03 

 

 11/15/2010 8:50 PM  PROTEIN UR 14.0 mg/dL

 

 2016 12:21 PM  Pap Smear Collected 

 

 2016 2:00 PM  WBC 9.8 RBC 4.60 HGB 13.60 g/dLHCT 41.40 %MCV 90.0 fLMCH 
29.60 pgMCHC 32.90 g/dLRDW CV 13.80 %MPV 9.70 fLPLT 271 %NEUT 59.80 %%LYMP 33.0 
%%MONO 6.40 %%EOS 0.60 %%BASO 0.20 %#NEUT 5.83 #LYMP 3.22 #MONO 0.62 #EOS 0.06 #
BASO 0.02 HIV AG/AB COMBO 0.14 RPR Non Reactive Rubella Antibodies, IgG 2.88 
Index

 

 2016 2:08 PM  COLOR YELLOW APPEARANCE CLEAR SPEC GRAV <=1.005 pH 6.0 
PROTEIN NEGATIVE GLUCOSE NEGATIVE mg/dLKETONE NEGATIVE BILIRUBIN NEGATIVE BLOOD 
NEGATIVE NITRITE NEGATIVE LEUK SCREEN TRACE CASTS/LPF NEGATIVE /LPFCRYSTALS 
NEGATIVE MUCOUS THRDS NEGATIVE BACTERIA NEGATIVE EPITH CELLS 3+++ SQUAMOUS /
HPFTRICHOMONAS NEGATIVE YEAST NEGATIVE 

 

 3/28/2016 2:18 PM  AFP Value 0.0240 ug/mLAFP MoM 1.02 hCG Value 72615.0 mIU/
mLhCG MoM 1.77 uE3 Value 0.70 ng/mLuE3 MoM 1.16 MARISA Value 207.520 pg/mLDIA MoM 
1.25 OSBR Risk       1 IN 13274 DSR (Second Trimester) 1IN 4435 DSR (By Age)    
1 IN 1003 T18 Risk Not increased T18 (By Age) 1:3907 

 

 2016 3:15 PM  WBC 11.5 RBC 3.75 HGB 11.70 g/dLHCT 35.50 %MCV 95.0 fLMCH 
31.20 pgMCHC 33.0 g/dLRDW CV 14.10 %MPV 9.70 fLPLT 273 %NEUT 73.0 %%LYMP 20.10 %
%MONO 5.70 %%EOS 0.80 %%BASO 0.10 %#NEUT 8.40 #LYMP 2.31 #MONO 0.65 #EOS 0.09 #
BASO 0.01 







History Of Immunizations







 Name  Date Admin  Mfg Name  Mfg Code  Trade Name  Lot#  Route  Inj  Vis Given  
Vis Pub  CVX

 

 HPV  2011  [employer]  MSD  GARDASIL  [id number]  Intramuscular  Left 
Deltoid  2011  999

 

 HPV  2011  [employer]  MSD  GARDASIL  12227  Intramuscular  Left 
Deltoid  2011  62

 

 Tdap  2016  [employer]  SKB  BOOSTRIX  B4G4G  Intramuscular  Right 
Deltoid  2016  115







History of Past Illness







 Name  Date of Onset  Comments

 

 Atrial Septal Defect, Ostium Secundum Type      

 

 Prenatal Care, High Risk Pregnancy  2010   

 

    Apr 15 2010  4:29PM   

 

 Prenatal Care, High Risk Pregnancy  Apr 15 2010  4:13PM   

 

    2010  9:54AM   

 

 Prenatal Care, High Risk Pregnancy  2010 10:10AM   

 

    May 13 2010  9:01AM   

 

    John 10 2010  2:45PM   

 

    2010  4:16PM   

 

 Pregnancy, First Normal  Aug 30 2010  9:14AM   

 

 Pregnancy, High Risk  Sep 13 2010  3:13PM   

 

 Group B Strep Screening,   Oct 14 2010  5:43PM   

 

 Pregnancy, High Risk  Oct 14 2010  5:43PM   

 

 Postoperative Examination Following Surgery  Dec  6 2010  9:15AM   

 

 IMPLANON  Insertion  Dec 15 2010 10:44AM   

 

 General Medical Exam, Child  2011  8:58AM   

 

 Contraceptive Management  2011  8:58AM   

 

 Routine gynecological examination  Jul 15 2011 10:03AM   

 

 Contraceptive Counseling  Jul 15 2011 10:03AM   

 

 Vaginal Discharge  Jul 15 2011 10:03AM   

 

 Irregular Menses  Jul 15 2011 10:03AM   

 

 Gardsil (HPV)  2011  8:54AM   

 

 Irregular Menses  Aug 31 2011  9:07AM   

 

 Family Planning  Aug 31 2011  9:07AM   

 

 Contraceptive Management  Andreas  3 2012  9:43AM   

 

 Local Infection  Andreas  3 2012  9:43AM   

 

 Well Child Examination  2012  1:46PM   

 

 Right knee pain  2012  1:46PM   

 

 Right knee pain  2012  1:24PM   

 

 Abscess  2012  1:24PM   

 

 Right knee pain  Mar 26 2012 10:32AM   

 

 General Medical Exam, Adult  2012  1:46PM   

 

 Pregnancy test confirmed positive  2016 11:37AM   

 

 Obesity  2016 11:37AM   

 

 Normal pregnancy in multigravida in second trimester  Mar 28 2016  2:01PM   

 

 History of atrial septal defect repair  May 11 2016  8:50AM   

 

 Normal pregnancy in multigravida in second trimester  2016  2:00PM   

 

 Need for Tdap vaccine  2016  1:38PM   

 

 Group B Strep Screening,   Aug 23 2016  3:43PM   

 

 Normal pregnancy in multigravida in third trimester  Aug 23 2016  3:43PM   

 

 Small for gestational age fetus affecting management of mother in [place] 
pregnancy in third trimester  Sep  7 2016  4:50PM   

 

 Third trimester pregnancy  Sep  7 2016  3:20PM   

 

 Postoperative Examination Following Surgery  Sep 19 2016  9:26AM   

 

 Post-Partum Follow-Up  Oct 24 2016 10:46AM   







Payers







 Insurance Name  Company Name  Plan Name  Plan Number  Policy Number  Policy 
Group Number  Start Date

 

    [employer] - Community [employer] [facility] RHC Comm  
   [id number]     N/A

 

    [employer] Community [employer] [facility] Comm Plan of     
[id number]     2016

 

    [place] [employer]  [place] [employer]     
[id number]     N/A

 

    zzzTest Medicare A  Test Medicare A     void     N/A







History of Encounters







 Visit Date  Visit Type  Provider

 

 10/24/2016  Office visit   

 

 10/24/2016  Office visit  Dr. Antonella Ventura MD

 

 2016  Office visit  Dr. Antonella Ventura MD

 

 2016  [facility]  Velma MANCIA

 

 2016  [facility]  Dr. Antonella Ventura MD

 

 2016  Office visit  Megha Piedra DO

 

 2016  Office visit  Dr. Antonella Ventura MD

 

 2016  Office visit  Dr. Antonella Ventura MD

 

 2016  Office visit  Dr. Antonella Ventura MD

 

 2016  Office visit  Dr. Antonella Ventura MD

 

 2016  Office visit  Dr. Antonella Ventura MD

 

 2016  Office visit  Dr. Antonella Ventura MD

 

 3/28/2016  Office visit  Dr. Antonella Ventura MD

 

 2016  Office visit  Dr. Antonella Ventura MD

 

 2016  Office visit  Dr. Antonella Ventura MD

 

 2016  Office visit  Velma MAGDACynthia Mendieta APRN

 

 3/26/2012  Office visit  Ivanna Swan APRN

 

 2012  Office visit  Ivanna Swan APRN

 

 2012  Office visit  Ivanna Swan APRN

 

 1/3/2012  Office visit  Ivanna Swan APRN

 

 2011  Nurse visit  Ivanna Swan APRN

 

 2011  Nurse visit  Ivanna Swan APRN

 

 7/15/2011  Office visit  Ivanna Swan APRN

 

 2011  Office visit  Ivanna Swan APRN

 

 12/15/2010  Office visit  Willard Sorensen MD

 

 2010  Surgery  Willard Sorensen MD

 

 2010  Hospital  Willard Sorensen MD

 

 11/15/2010  Office visit  Willard Sorensen MD

 

 11/15/2010  [facility]  Willard Sorensen MD

 

 2010  Office visit  Willard Sorensen MD

 

 2010  Office visit  Willard Sorensen MD

 

 10/28/2010  Office visit  Willard Sorensen MD

 

 10/21/2010  Office visit  Willard Sorensen MD

 

 10/14/2010  Office visit  Willard Sorensen MD

 

 10/9/2010  [facility]  Willard Sorensen MD

 

 10/7/2010  Office visit  Willard Sorensen MD

 

 2010  Office visit  Willard Sorensen MD

 

 2010  Office visit  Willard Sorensen MD

 

 2010  Office visit  Willard oSrensen MD

 

 2010  Office visit  Willard Sorensen MD

 

 2010  Office visit  Willard Sorensen MD

 

 6/10/2010  Office visit  Willard Sroensen MD

 

 2010  Office visit  Willard Sorensen MD

 

 2010  Office visit  Willard Sorensen MD

 

 4/15/2010  Office visit  Willard Sorensen MD

## 2018-11-29 NOTE — XMS REPORT
MU2 Ambulatory Summary

 Created on: 2016



Radha Aviles

External Reference #: [id number]

: 1990

Sex: Female



Demographics







 Address  [address]

[place]  [address]

 

 Home Phone  [phone]

 

 Preferred Language  English

 

 Marital Status  Never 

 

 Confucianism Affiliation  Unknown

 

 Race  White

 

 Ethnic Group  Not  or 





Author







 Author  Antonella Ventura

 

 [facility]

 

 Address  [address]

[place], [place]  [id number]



 

 Phone  [phone]







Care Team Providers







 Care Team Member Name  Role  Phone

 

 Antonella Ventura  PCP  Unavailable







Allergies and Adverse Reactions







 Name  Reaction  Notes

 

 NO KNOWN DRUG ALLERGIES      







Plan of Treatment

Not available.



Medications







  

 

 Name  Start Date  Expiration Date  SIG  Comments

 

 Nata[place] 60 mg iron-1 mg oral tablet  4/15/2010  3/11/2011  take 1 tablet by 
oral route once daily for 30 days   

 

 Diflucan 150 mg oral tablet  2010  take 1 tablet (150 mg) by 
oral route once  for 1 days   

 

 Premarin 1.25 mg oral tablet  2011  take 1 tablet by oral route 
daily for 10 days   

 

 doxycycline hyclate 100 mg oral tablet  7/15/2011  2011  take 1 tablet (
100 mg) by oral route every 12 hours for 10 days   

 

 ibuprofen 800 mg oral tablet  2012  take 1 tablet by oral route 
3 times a day for 30 days   

 

 Bactrim -160 mg oral tablet  2012  take 1 tablet by oral 
route every 12 hours for 7 days   

 

 Macrobid 100 mg oral capsule  2016  take 1 capsule (100 mg) by 
oral route 2 times per day with food for 7 days   









 Discontinued 

 

 Name  Start Date  Discontinued Date  SIG  Comments

 

 atenolol 25 mg oral tablet     2011  take 1/2 tablet by oral route QD   

 

 Medrol (Antwan) 4 mg oral tablets,dose pack  2012  3/26/2012  take as 
directed   







Problem List







 Description  Status  Onset

 

 Prenatal Care, High Risk Pregnancy  Active  2010







Vital Signs







 Date  Time  BP-Sys(mm[Hg]  BP-Marisa(mm[Hg])  HR(bpm)  RR(rpm)  Temp  WT  HT  HC  
BMI  BSA  BMI Percentile  O2 Sat(%)

 

 2016  11:15:00 AM  130 mmHg  70 mmHg  80 bpm  20 rpm     214 lbs  64 in  
   36.73 kg/m2  2.09 m2      

 

 3/26/2012  10:30:00 AM  126 mmHg  62 mmHg  106 bpm  18 rpm  96.8 F  205 lbs  
64 in     35.1878 kg/m  2.0491 m     97 %

 

 2012  1:22:00 PM  120 mmHg  68 mmHg  66 bpm  18 rpm  97 F  202.375 lbs  
64 in     34.74 kg/m2  2.04 m2      

 

 2012  1:44:00 PM  122 mmHg  64 mmHg  68 bpm  18 rpm  98.2 F  198.125 lbs  
64 in     34.0077 kg/m  2.0145 m      

 

 1/3/2012  9:40:00 AM  103 mmHg  77 mmHg  64 bpm  18 rpm  97.7 F  197.125 lbs  
64 in     33.84 kg/m2  2.01 m2      

 

 2011  9:06:00 AM  118 mmHg  68 mmHg  74 bpm     97.4 F  189 lbs          
        

 

 7/15/2011  10:06:00 AM  126 mmHg  70 mmHg  73 bpm  20 rpm  97.9 F  180.5 lbs  
64 in     30.98 kg/m2  1.92 m2  0 %  99 %

 

 2011  8:56:00 AM  96 mmHg  68 mmHg  66 bpm  20 rpm  97.5 F  177.5 lbs  64 
in     30.4675 kg/m  1.9067 m  0 %  98 %

 

 12/15/2010  10:43:00 AM  104 mmHg  64 mmHg  48 bpm     97.8 F  150 lbs        
          

 

 2010  9:15:00 AM  112 mmHg  72 mmHg  49 bpm     98 F  149 lbs  64 in     
25.5755 kg/m  1.747 m  81.5 %   

 

 2010  8:54:00 AM  117 mmHg  65 mmHg  73 bpm  20 rpm  97.8 F  160.5 lbs   
               

 

 2010  9:42:00 AM  134 mmHg  66 mmHg  66 bpm        161 lbs               
   

 

 4/15/2010  4:02:00 PM  135 mmHg  71 mmHg  77 bpm        162 lbs  64 in     
27.81 kg/m2  1.82 m2  89.9 %   







Social History







 Name  Description  Comments

 

 History of tobacco usage     Quit smoking in January - was smoking 1-2cig/day

 

 denies alcohol use      

 

 Tobacco  Former smoker   







History of Procedures







 Date Ordered  Description  Order Status

 

 2011 12:00 AM  URINE PREGNANCY TEST  Reviewed

 

 2011 12:00 AM  IMMUNIZATION ADMIN  Reviewed

 

 2011 12:00 AM  HPV VACCINE 4 VALENT IM  Reviewed

 

 2016 12:00 AM  CYTOPATH C/V THIN LAYER  Returned

 

 2016 12:00 AM  SPECIMEN HANDLING OFFICE-LAB  Reviewed

 

 2016 12:00 AM  N.GONORRHOEAE DNA AMP PROB  Returned

 

 2016 12:00 AM  CHLAMYDIA CULTURE  Returned

 

 2016 12:00 AM  HIV-1ANTIBODY  Returned

 

 2016 12:00 AM  URINALYSIS AUTO W/SCOPE  Returned

 

 2016 12:00 AM  OBSTETRIC PANEL  Returned

 

 2016 12:00 AM  GLUCOSE TOLERANCE TEST (GTT)  Returned

 

 3/28/2016 12:00 AM  ALPHA-FETOPROTEIN SERUM  Returned

 

 3/28/2016 12:00 AM  CHORIONIC GONADOTROPIN TEST  Returned

 

 3/28/2016 12:00 AM  CHORIONIC GONADOTROPIN ASSAY  Returned

 

 5/3/2016 12:00 AM  OB US >/=14 WKS SNGL FETUS  Returned

 

 2016 12:00 AM  RH IG FULL-DOSE IM  Returned

 

 2016 12:00 AM  Type and screen  Returned

 

 2016 12:00 AM  GLUCOSE TOLERANCE TEST (GTT)  Returned

 

 2016 12:00 AM  COMPLETE CBC W/AUTO DIFF WBC  Returned

 

 2012 12:00 AM  X-RAY EXAM KNEE 4 OR MORE  Returned

 

 2016 12:00 AM  TDAP VACCINE 7 YRS/> IM  Reviewed

 

 2016 12:00 AM  IMMUNIZATION ADMIN  Reviewed

 

 4/15/2010 12:00 AM  OBSTETRIC PANEL  Reviewed

 

 4/15/2010 12:00 AM  HIV-1ANTIBODY  Reviewed

 

 4/15/2010 12:00 AM  URINALYSIS NONAUTO W/SCOPE  Reviewed

 

 4/15/2010 12:00 AM  OB US < 14 WKS SINGLE FETUS  Reviewed

 

 2010 12:00 AM  GLUCOSE TEST  Reviewed

 

 2010 12:00 AM  Rhogam  Reviewed

 

 2010 12:00 AM  Type and screen  Reviewed

 

 2010 12:00 AM  COMPLETE CBC W/AUTO DIFF WBC  Reviewed

 

 2010 12:00 AM  COMPLETE CBC W/AUTO DIFF WBC  Reviewed

 

 10/14/2010 12:00 AM  CULTURE OTHR SPECIMN AEROBIC  Reviewed

 

 12/15/2010 12:00 AM  INSERTION IMPLANON, IMPLANTABLE CONTRACEPTIVE CAPSULES  
Reviewed

 

 12/15/2010 12:00 AM  Etonogestrel (contraceptive) implant system, (Implanon)  
Reviewed

 

 7/15/2011 12:00 AM  CYTOPATH TBS C/V MANUAL  Reviewed

 

 7/15/2011 12:00 AM  CHLAMYDIA CULTURE  Reviewed

 

 7/15/2011 12:00 AM  N.GONORRHOEAE DNA AMP PROB  Reviewed

 

 7/15/2011 12:00 AM  URINE PREGNANCY TEST  Reviewed

 

 2011 12:00 AM  IMMUNIZATION ADMIN  Reviewed

 

 2011 12:00 AM  HPV VACCINE 4 VALENT IM  Reviewed







Results Summary







 Data and Description  Results

 

 2010 10:41 AM  RUBELLA 34.0 IU/mLCOLOR YELLOW APPEARANCE HAZY SPEC GRAV 
1.025 pH 7.0 PROTEIN TRACE GLUCOSE NEGATIVE KETONE 15 BILIRUBIN NEGATIVE BLOOD 
NEGATIVE NITRITE NEGATIVE LEUK SCREEN NEGATIVE CASTS/LPF NEGATIVE CRYSTALS 
TRACE AMORPH MUCOUS THRDS FEW BACTERIA NEGATIVE EPITH CELLS FEW SQUAMOUS 
TRICHOMONAS NEGATIVE YEAST NEGATIVE WBC 6.3 RBC 4.57 HGB 13.90 g/dLHCT 40.30 %
MCV 88.0 fLMCH 30.40 pgMCHC 34.50 g/dLMPV 9.20 fLPLT 258 %NEUT 65.10 %%LYMP 
28.30 %#NEUT 4.10 #LYMP 1.80 

 

 2010 3:48 PM  WBC 12.9 RBC 4.02 HGB 12.60 g/dLHCT 37.40 %MCV 93.0 fLMCH 
31.30 pgMCHC 33.70 g/dLRDW CV 14.10 %MPV 9.50 fLPLT 263 %NEUT 69.20 %%LYMP 
22.30 %%MONO 7.60 %%EOS 0.70 %%BASO 0.20 %#NEUT 8.91 #LYMP 2.88 #MONO 0.98 #EOS 
0.09 #BASO 0.03 

 

 10/22/2010 8:21 PM  COLOR YELLOW APPEARANCE CLEAR SPEC GRAV 1.010 pH 6.5 
PROTEIN NEGATIVE GLUCOSE NEGATIVE KETONE 15 BILIRUBIN NEGATIVE BLOOD NEGATIVE 
NITRITE NEGATIVE LEUK SCREEN SMALL CASTS/LPF NEGATIVE CRYSTALS NEGATIVE MUCOUS 
THRDS NEGATIVE BACTERIA 2++ EPITH CELLS 3+++ SQUAMOUS TRICHOMONAS NEGATIVE 
YEAST NEGATIVE 

 

 2010 11:58 PM  COLOR YELLOW APPEARANCE CLEAR SPEC GRAV 1.015 pH 6.5 
PROTEIN NEGATIVE GLUCOSE NEGATIVE KETONE NEGATIVE BILIRUBIN NEGATIVE BLOOD 
NEGATIVE NITRITE NEGATIVE LEUK SCREEN LARGE CASTS/LPF NEGATIVE CRYSTALS 
NEGATIVE MUCOUS THRDS NEGATIVE BACTERIA 2++ EPITH CELLS 2++ SQUAMOUS 
TRICHOMONAS NEGATIVE YEAST NEGATIVE 

 

 11/15/2010 5:30 PM  .0 IU/LURIC ACID 3.7 mg/dLGLUCOSE 66.0 mg/dLSODIUM 
137.0 mmol/LPOTASSIUM 3.10 mmol/LCHLORIDE 105.0 mmol/LCO2 21.0 mmol/LBUN 3.0 mg/
dLCREATININE 0.50 mg/dLSGOT/AST 20.0 IU/LSGPT/ALT 18.0 IU/LALK PHOS 152.0 IU/
LTOTAL PROTEIN 6.20 g/dLALBUMIN 3.40 g/dLTOTAL BILI 0.60 mg/dLCALCIUM 9.10 mg/
dLeGFR >60 mL/min/1.73 m2WBC 16.9 RBC 4.18 HGB 13.30 g/dLHCT 39.70 %MCV 95.0 
fLMCH 31.80 pgMCHC 33.50 g/dLRDW CV 14.0 %MPV 10.10 fLPLT 268 %NEUT 75.70 %%
LYMP 17.50 %%MONO 6.20 %%EOS 0.40 %%BASO 0.20 %#NEUT 12.82 #LYMP 2.97 #MONO 
1.05 #EOS 0.06 #BASO 0.03 

 

 11/15/2010 8:50 PM  PROTEIN UR 14.0 mg/dL

 

 2016 12:21 PM  Pap Smear Collected 

 

 2016 2:00 PM  WBC 9.8 RBC 4.60 HGB 13.60 g/dLHCT 41.40 %MCV 90.0 fLMCH 
29.60 pgMCHC 32.90 g/dLRDW CV 13.80 %MPV 9.70 fLPLT 271 %NEUT 59.80 %%LYMP 33.0 
%%MONO 6.40 %%EOS 0.60 %%BASO 0.20 %#NEUT 5.83 #LYMP 3.22 #MONO 0.62 #EOS 0.06 #
BASO 0.02 HIV AG/AB COMBO 0.14 RPR Non Reactive Rubella Antibodies, IgG 2.88 
Index

 

 2016 2:08 PM  COLOR YELLOW APPEARANCE CLEAR SPEC GRAV <=1.005 pH 6.0 
PROTEIN NEGATIVE GLUCOSE NEGATIVE mg/dLKETONE NEGATIVE BILIRUBIN NEGATIVE BLOOD 
NEGATIVE NITRITE NEGATIVE LEUK SCREEN TRACE CASTS/LPF NEGATIVE /LPFCRYSTALS 
NEGATIVE MUCOUS THRDS NEGATIVE BACTERIA NEGATIVE EPITH CELLS 3+++ SQUAMOUS /
HPFTRICHOMONAS NEGATIVE YEAST NEGATIVE 

 

 3/28/2016 2:18 PM  AFP Value 0.0240 ug/mLAFP MoM 1.02 hCG Value 35470.0 mIU/
mLhCG MoM 1.77 uE3 Value 0.70 ng/mLuE3 MoM 1.16 MARISA Value 207.520 pg/mLDIA MoM 
1.25 OSBR Risk       1 [place] [address] DSR (Second Trimester) 1IN 4435 DSR (By Age)    
1 IN 1003 T18 Risk Not increased T18 (By Age) 1:3907 

 

 2016 3:15 PM  WBC 11.5 RBC 3.75 HGB 11.70 g/dLHCT 35.50 %MCV 95.0 fLMCH 
31.20 pgMCHC 33.0 g/dLRDW CV 14.10 %MPV 9.70 fLPLT 273 %NEUT 73.0 %%LYMP 20.10 %
%MONO 5.70 %%EOS 0.80 %%BASO 0.10 %#NEUT 8.40 #LYMP 2.31 #MONO 0.65 #EOS 0.09 #
BASO 0.01 







History Of Immunizations







 Name  Date Admin  Mfg Name  Mfg Code  Trade Name  Lot#  Route  Inj  Vis Given  
Vis Pub  CVX

 

 HPV  2011  [employer]  MSD  GARDASIL  [id number]  Intramuscular  Left 
Deltoid  2011  999

 

 HPV  2011  [employer]  MSD  GARDASIL  [id number]  Intramuscular  Left 
Deltoid  2011  62

 

 Tdap  2016  [employer]ax[employer]  SKB  BOOSTRIX  [id number]  Intramuscular  Right 
Deltoid  2016  115







History of Past Illness







 Name  Date of Onset  Comments

 

 Atrial Septal Defect, Ostium Secundum Type      

 

 Prenatal Care, High Risk Pregnancy  2010   

 

    Apr 15 2010  4:29PM   

 

 Prenatal Care, High Risk Pregnancy  Apr 15 2010  4:13PM   

 

    2010  9:54AM   

 

 Prenatal Care, High Risk Pregnancy  2010 10:10AM   

 

    May 13 2010  9:01AM   

 

    John 10 2010  2:45PM   

 

    2010  4:16PM   

 

 Pregnancy, First Normal  Aug 30 2010  9:14AM   

 

 Pregnancy, High Risk  Sep 13 2010  3:13PM   

 

 Group B Strep Screening,   Oct 14 2010  5:43PM   

 

 Pregnancy, High Risk  Oct 14 2010  5:43PM   

 

 Postoperative Examination Following Surgery  Dec  6 2010  9:15AM   

 

 IMPLANON  Insertion  Dec 15 2010 10:44AM   

 

 General Medical Exam, Child  2011  8:58AM   

 

 Contraceptive Management  2011  8:58AM   

 

 Routine gynecological examination  Jul 15 2011 10:03AM   

 

 Contraceptive Counseling  Jul 15 2011 10:03AM   

 

 Vaginal Discharge  Jul 15 2011 10:03AM   

 

 Irregular Menses  Jul 15 2011 10:03AM   

 

 Gardsil (HPV)  2011  8:54AM   

 

 Irregular Menses  Aug 31 2011  9:07AM   

 

 Family Planning  Aug 31 2011  9:07AM   

 

 Contraceptive Management  Andreas  3 2012  9:43AM   

 

 Local Infection  Andreas  3 2012  9:43AM   

 

 Well Child Examination  2012  1:46PM   

 

 Right knee pain  2012  1:46PM   

 

 Right knee pain  2012  1:24PM   

 

 Abscess  2012  1:24PM   

 

 Right knee pain  Mar 26 2012 10:32AM   

 

 General Medical Exam, Adult  2012  1:46PM   

 

 Pregnancy test confirmed positive  2016 11:37AM   

 

 Obesity  2016 11:37AM   

 

 Normal pregnancy in multigravida in second trimester  Mar 28 2016  2:01PM   

 

 History of atrial septal defect repair  May 11 2016  8:50AM   

 

 Normal pregnancy in multigravida in second trimester  2016  2:00PM   

 

 Need for Tdap vaccine  2016  1:38PM   







Payers







 Insurance Name  Company Name  Plan Name  Plan Number  Policy Number  Policy 
Group Number  Start Date

 

    [facility] Plan of [facility]  
   [id number]     N/A

 

    [facility]  [employer] [employer]     
[id number]     N/A

 

    zzzTest Medicare A  Test Medicare A     void     N/A







History of Encounters







 Visit Date  Visit Type  Provider

 

 2016  Office visit  Dr. Antonella Ventura MD

 

 2016  Office visit  Dr. Antonella Ventura MD

 

 2016  Office visit  Dr. Antonella Ventura MD

 

 3/28/2016  Office visit  Dr. Antonella Ventura MD

 

 2016  Office visit  Dr. Antonella Ventura MD

 

 2016  Office visit  Dr. Antonella Ventura MD

 

 2016  Office visit  Velma Mendieta APRN

 

 3/26/2012  Office visit  Ivanna Swan APRN

 

 2012  Office visit  Ivanna Swan APRN

 

 2012  Office visit  Ivanna Swan APRN

 

 1/3/2012  Office visit  Ivanna Swan APRN

 

 2011  Nurse visit  Ivanna Swan APRN

 

 2011  Nurse visit  Ivanna Swan APRN

 

 7/15/2011  Office visit  Ivanna Swan APRN

 

 2011  Office visit  Ivanna Swan APRN

 

 12/15/2010  Office visit  Willard Sorensen MD

 

 2010  Surgery  Willard Sorensen MD

 

 2010  Hospital  Willard Sorensen MD

 

 11/15/2010  Office visit  Willard Sorensen MD

 

 11/15/2010  [facility]  Willard Sorensen MD

 

 2010  Office visit  Willard Sorensen MD

 

 2010  Office visit  Willard Sorensen MD

 

 10/28/2010  Office visit  Willard Sorensen MD

 

 10/21/2010  Office visit  Willard Sorensen MD

 

 10/14/2010  Office visit  Willard Sorensen MD

 

 10/9/2010  [facility]  Willard Sorensen MD

 

 10/7/2010  Office visit  Willard Sorensen MD

 

 2010  Office visit  Willard Sorensen MD

 

 2010  Office visit  Willard Sorensen MD

 

 2010  Office visit  Willard Sorensen MD

 

 2010  Office visit  Willard Sorensen MD

 

 2010  Office visit  Willard Sorensen MD

 

 6/10/2010  Office visit  Willard Sorensen MD

 

 2010  Office visit  Willard Sorensen MD

 

 2010  Office visit  Willard Sorensen MD

 

 4/15/2010  Office visit  Willard Sorensen MD

## 2018-11-29 NOTE — XMS REPORT
MU2 Ambulatory Summary

 Created on: 2016



Radha Aviles

External Reference #: [id number]

: 1990

Sex: Female



Demographics







 Address  [address] [place] [address] [address]

[place]  [address]

 

 Home Phone  [phone]

 

 Preferred Language  English

 

 Marital Status  Never 

 

 Denominational Affiliation  Unknown

 

 Race  White

 

 Ethnic Group  Not  or 





Author







 Author  Antonella Ventura  [facility]

 

 Address  [address]

[place], [place]  [id number]



 

 Phone  [phone]







Care Team Providers







 Care Team Member Name  Role  Phone

 

 Antonella Ventura  PCP  Unavailable







Allergies and Adverse Reactions







 Name  Reaction  Notes

 

 NO KNOWN DRUG ALLERGIES      







Plan of Treatment







 Planned Activity  Comments  Planned Date  Planned Time  Plan/Goal

 

 RH IG FULL-DOSE IM     2016  12:00 AM   

 

 GLUCOSE TOLERANCE TEST (GTT)     2016  12:00 AM   

 

 COMPLETE CBC W/AUTO DIFF WBC     2016  12:00 AM   







Medications







  

 

 Name  Start Date  Expiration Date  SIG  Comments

 

 [place] 60 mg iron-1 mg oral tablet  4/15/2010  3/11/2011  take 1 tablet by 
oral route once daily for 30 days   

 

 Diflucan 150 mg oral tablet  2010  take 1 tablet (150 mg) by 
oral route once  for 1 days   

 

 Premarin 1.25 mg oral tablet  2011  take 1 tablet by oral route 
daily for 10 days   

 

 doxycycline hyclate 100 mg oral tablet  7/15/2011  2011  take 1 tablet (
100 mg) by oral route every 12 hours for 10 days   

 

 ibuprofen 800 mg oral tablet  2012  take 1 tablet by oral route 
3 times a day for 30 days   

 

 Bactrim -160 mg oral tablet  2012  take 1 tablet by oral 
route every 12 hours for 7 days   

 

 Macrobid 100 mg oral capsule  2016  take 1 capsule (100 mg) by 
oral route 2 times per day with food for 7 days   









 Discontinued 

 

 Name  Start Date  Discontinued Date  SIG  Comments

 

 atenolol 25 mg oral tablet     2011  take 1/2 tablet by oral route QD   

 

 Medrol (Antwan) 4 mg oral tablets,dose pack  2012  3/26/2012  take as 
directed   







Problem List







 Description  Status  Onset

 

 Prenatal Care, High Risk Pregnancy  Active  2010







Vital Signs







 Date  Time  BP-Sys(mm[Hg]  BP-Marisa(mm[Hg])  HR(bpm)  RR(rpm)  Temp  WT  HT  HC  
BMI  BSA  BMI Percentile  O2 Sat(%)

 

 2016  11:15:00 AM  130 mmHg  70 mmHg  80 bpm  20 rpm     214 lbs  64 in  
   36.73 kg/m2  2.09 m2      

 

 3/26/2012  10:30:00 AM  126 mmHg  62 mmHg  106 bpm  18 rpm  96.8 F  205 lbs  
64 in     35.1878 kg/m  2.0491 m     97 %

 

 2012  1:22:00 PM  120 mmHg  68 mmHg  66 bpm  18 rpm  97 F  202.375 lbs  
64 in     34.74 kg/m2  2.04 m2      

 

 2012  1:44:00 PM  122 mmHg  64 mmHg  68 bpm  18 rpm  98.2 F  198.125 lbs  
64 in     34.0077 kg/m  2.0145 m      

 

 1/3/2012  9:40:00 AM  103 mmHg  77 mmHg  64 bpm  18 rpm  97.7 F  197.125 lbs  
64 in     33.84 kg/m2  2.01 m2      

 

 2011  9:06:00 AM  118 mmHg  68 mmHg  74 bpm     97.4 F  189 lbs          
        

 

 7/15/2011  10:06:00 AM  126 mmHg  70 mmHg  73 bpm  20 rpm  97.9 F  180.5 lbs  
64 in     30.98 kg/m2  1.92 m2  0 %  99 %

 

 2011  8:56:00 AM  96 mmHg  68 mmHg  66 bpm  20 rpm  97.5 F  177.5 lbs  64 
in     30.4675 kg/m  1.9067 m  0 %  98 %

 

 12/15/2010  10:43:00 AM  104 mmHg  64 mmHg  48 bpm     97.8 F  150 lbs        
          

 

 2010  9:15:00 AM  112 mmHg  72 mmHg  49 bpm     98 F  149 lbs  64 in     
25.5755 kg/m  1.747 m  81.5 %   

 

 2010  8:54:00 AM  117 mmHg  65 mmHg  73 bpm  20 rpm  97.8 F  160.5 lbs   
               

 

 2010  9:42:00 AM  134 mmHg  66 mmHg  66 bpm        161 lbs               
   

 

 4/15/2010  4:02:00 PM  135 mmHg  71 mmHg  77 bpm        162 lbs  64 in     
27.81 kg/m2  1.82 m2  89.9 %   







Social History







 Name  Description  Comments

 

 History of tobacco usage     Quit smoking in January - was smoking 1-2cig/day

 

 denies alcohol use      

 

 Tobacco  Former smoker   







History of Procedures







 Date Ordered  Description  Order Status

 

 2011 12:00 AM  URINE PREGNANCY TEST  Reviewed

 

 2011 12:00 AM  IMMUNIZATION ADMIN  Reviewed

 

 2011 12:00 AM  HPV VACCINE 4 VALENT IM  Reviewed

 

 2016 12:00 AM  CYTOPATH C/V THIN LAYER  Returned

 

 2016 12:00 AM  SPECIMEN HANDLING OFFICE-LAB  Reviewed

 

 2016 12:00 AM  N.GONORRHOEAE DNA AMP PROB  Returned

 

 2016 12:00 AM  CHLAMYDIA CULTURE  Returned

 

 2016 12:00 AM  HIV-1ANTIBODY  Returned

 

 2016 12:00 AM  URINALYSIS AUTO W/SCOPE  Returned

 

 2016 12:00 AM  OBSTETRIC PANEL  Returned

 

 2016 12:00 AM  GLUCOSE TOLERANCE TEST (GTT)  Returned

 

 3/28/2016 12:00 AM  ALPHA-FETOPROTEIN SERUM  Returned

 

 3/28/2016 12:00 AM  CHORIONIC GONADOTROPIN TEST  Returned

 

 3/28/2016 12:00 AM  CHORIONIC GONADOTROPIN ASSAY  Returned

 

 5/3/2016 12:00 AM  OB US >/=14 WKS SNGL FETUS  Returned

 

 2012 12:00 AM  X-RAY EXAM KNEE 4 OR MORE  Returned

 

 4/15/2010 12:00 AM  OBSTETRIC PANEL  Reviewed

 

 4/15/2010 12:00 AM  HIV-1ANTIBODY  Reviewed

 

 4/15/2010 12:00 AM  URINALYSIS NONAUTO W/SCOPE  Reviewed

 

 4/15/2010 12:00 AM  OB US < 14 WKS SINGLE FETUS  Reviewed

 

 2010 12:00 AM  GLUCOSE TEST  Reviewed

 

 2010 12:00 AM  Rhogam  Reviewed

 

 2010 12:00 AM  Type and screen  Reviewed

 

 2010 12:00 AM  COMPLETE CBC W/AUTO DIFF WBC  Reviewed

 

 2010 12:00 AM  COMPLETE CBC W/AUTO DIFF WBC  Reviewed

 

 10/14/2010 12:00 AM  CULTURE OTHR SPECIMN AEROBIC  Reviewed

 

 12/15/2010 12:00 AM  INSERTION IMPLANON, IMPLANTABLE CONTRACEPTIVE CAPSULES  
Reviewed

 

 12/15/2010 12:00 AM  Etonogestrel (contraceptive) implant system, (Implanon)  
Reviewed

 

 7/15/2011 12:00 AM  CYTOPATH TBS C/V MANUAL  Reviewed

 

 7/15/2011 12:00 AM  CHLAMYDIA CULTURE  Reviewed

 

 7/15/2011 12:00 AM  N.GONORRHOEAE DNA AMP PROB  Reviewed

 

 7/15/2011 12:00 AM  URINE PREGNANCY TEST  Reviewed

 

 2011 12:00 AM  IMMUNIZATION ADMIN  Reviewed

 

 2011 12:00 AM  HPV VACCINE 4 VALENT IM  Reviewed







Results Summary







 Data and Description  Results

 

 2010 10:41 AM  RUBELLA 34.0 IU/mLCOLOR YELLOW APPEARANCE HAZY SPEC GRAV 
1.025 pH 7.0 PROTEIN TRACE GLUCOSE NEGATIVE KETONE 15 BILIRUBIN NEGATIVE BLOOD 
NEGATIVE NITRITE NEGATIVE LEUK SCREEN NEGATIVE CASTS/LPF NEGATIVE CRYSTALS 
TRACE AMORPH MUCOUS THRDS FEW BACTERIA NEGATIVE EPITH CELLS FEW SQUAMOUS 
TRICHOMONAS NEGATIVE YEAST NEGATIVE WBC 6.3 RBC 4.57 HGB 13.90 g/dLHCT 40.30 %
MCV 88.0 fLMCH 30.40 pgMCHC 34.50 g/dLMPV 9.20 fLPLT 258 %NEUT 65.10 %%LYMP 
28.30 %#NEUT 4.10 #LYMP 1.80 

 

 2010 3:48 PM  WBC 12.9 RBC 4.02 HGB 12.60 g/dLHCT 37.40 %MCV 93.0 fLMCH 
31.30 pgMCHC 33.70 g/dLRDW CV 14.10 %MPV 9.50 fLPLT 263 %NEUT 69.20 %%LYMP 
22.30 %%MONO 7.60 %%EOS 0.70 %%BASO 0.20 %#NEUT 8.91 #LYMP 2.88 #MONO 0.98 #EOS 
0.09 #BASO 0.03 

 

 10/22/2010 8:21 PM  COLOR YELLOW APPEARANCE CLEAR SPEC GRAV 1.010 pH 6.5 
PROTEIN NEGATIVE GLUCOSE NEGATIVE KETONE 15 BILIRUBIN NEGATIVE BLOOD NEGATIVE 
NITRITE NEGATIVE LEUK SCREEN SMALL CASTS/LPF NEGATIVE CRYSTALS NEGATIVE MUCOUS 
THRDS NEGATIVE BACTERIA 2++ EPITH CELLS 3+++ SQUAMOUS TRICHOMONAS NEGATIVE 
YEAST NEGATIVE 

 

 2010 11:58 PM  COLOR YELLOW APPEARANCE CLEAR SPEC GRAV 1.015 pH 6.5 
PROTEIN NEGATIVE GLUCOSE NEGATIVE KETONE NEGATIVE BILIRUBIN NEGATIVE BLOOD 
NEGATIVE NITRITE NEGATIVE LEUK SCREEN LARGE CASTS/LPF NEGATIVE CRYSTALS 
NEGATIVE MUCOUS THRDS NEGATIVE BACTERIA 2++ EPITH CELLS 2++ SQUAMOUS 
TRICHOMONAS NEGATIVE YEAST NEGATIVE 

 

 11/15/2010 5:30 PM  .0 IU/LURIC ACID 3.7 mg/dLGLUCOSE 66.0 mg/dLSODIUM 
137.0 mmol/LPOTASSIUM 3.10 mmol/LCHLORIDE 105.0 mmol/LCO2 21.0 mmol/LBUN 3.0 mg/
dLCREATININE 0.50 mg/dLSGOT/AST 20.0 IU/LSGPT/ALT 18.0 IU/LALK PHOS 152.0 IU/
LTOTAL PROTEIN 6.20 g/dLALBUMIN 3.40 g/dLTOTAL BILI 0.60 mg/dLCALCIUM 9.10 mg/
dLeGFR >60 mL/min/1.73 m2WBC 16.9 RBC 4.18 HGB 13.30 g/dLHCT 39.70 %MCV 95.0 
fLMCH 31.80 pgMCHC 33.50 g/dLRDW CV 14.0 %MPV 10.10 fLPLT 268 %NEUT 75.70 %%
LYMP 17.50 %%MONO 6.20 %%EOS 0.40 %%BASO 0.20 %#NEUT 12.82 #LYMP 2.97 #MONO 
1.05 #EOS 0.06 #BASO 0.03 

 

 11/15/2010 8:50 PM  PROTEIN UR 14.0 mg/dL

 

 2016 12:21 PM  Pap Smear Collected 

 

 2016 2:00 PM  WBC 9.8 RBC 4.60 HGB 13.60 g/dLHCT 41.40 %MCV 90.0 fLMCH 
29.60 pgMCHC 32.90 g/dLRDW CV 13.80 %MPV 9.70 fLPLT 271 %NEUT 59.80 %%LYMP 33.0 
%%MONO 6.40 %%EOS 0.60 %%BASO 0.20 %#NEUT 5.83 #LYMP 3.22 #MONO 0.62 #EOS 0.06 #
BASO 0.02 HIV AG/AB COMBO 0.14 RPR Non Reactive Rubella Antibodies, IgG 2.88 
Index

 

 2016 2:08 PM  COLOR YELLOW APPEARANCE CLEAR SPEC GRAV <=1.005 pH 6.0 
PROTEIN NEGATIVE GLUCOSE NEGATIVE mg/dLKETONE NEGATIVE BILIRUBIN NEGATIVE BLOOD 
NEGATIVE NITRITE NEGATIVE LEUK SCREEN TRACE CASTS/LPF NEGATIVE /LPFCRYSTALS 
NEGATIVE MUCOUS THRDS NEGATIVE BACTERIA NEGATIVE EPITH CELLS 3+++ SQUAMOUS /
HPFTRICHOMONAS NEGATIVE YEAST NEGATIVE 

 

 3/28/2016 2:18 PM  AFP Value 0.0240 ug/mLAFP MoM 1.02 hCG Value 74882.0 mIU/
mLhCG MoM 1.77 uE3 Value 0.70 ng/mLuE3 MoM 1.16 MARISA Value 207.520 pg/mLDIA MoM 
1.25 OSBR Risk       1 [place] [address]41 DSR (Second Trimester) 1IN 4435 DSR (By Age)    
1 IN 1003 T18 Risk Not increased T18 (By Age) 1:3907 







History Of Immunizations







 Name  Date Admin  Mfg Name  Mfg Code  Trade Name  Lot#  Route  Inj  Vis Given  
Vis Pub  CVX

 

 HPV  2011  [employer]  MSD  GARDASIL  [id number]  Intramuscular  Left 
Deltoid  2011  999

 

 HPV  2011  [employer]  MSD  GARDASIL  [id number]  Intramuscular  Left 
Deltoid  2011  62







History of Past Illness







 Name  Date of Onset  Comments

 

 Atrial Septal Defect, Ostium Secundum Type      

 

 Prenatal Care, High Risk Pregnancy  2010   

 

    Apr 15 2010  4:29PM   

 

 Prenatal Care, High Risk Pregnancy  Apr 15 2010  4:13PM   

 

    2010  9:54AM   

 

 Prenatal Care, High Risk Pregnancy  2010 10:10AM   

 

    May 13 2010  9:01AM   

 

    John 10 2010  2:45PM   

 

    2010  4:16PM   

 

 Pregnancy, First Normal  Aug 30 2010  9:14AM   

 

 Pregnancy, High Risk  Sep 13 2010  3:13PM   

 

 Group B Strep Screening,   Oct 14 2010  5:43PM   

 

 Pregnancy, High Risk  Oct 14 2010  5:43PM   

 

 Postoperative Examination Following Surgery  Dec  6 2010  9:15AM   

 

 IMPLANON  Insertion  Dec 15 2010 10:44AM   

 

 General Medical Exam, Child  2011  8:58AM   

 

 Contraceptive Management  2011  8:58AM   

 

 Routine gynecological examination  Jul 15 2011 10:03AM   

 

 Contraceptive Counseling  Jul 15 2011 10:03AM   

 

 Vaginal Discharge  Jul 15 2011 10:03AM   

 

 Irregular Menses  Jul 15 2011 10:03AM   

 

 Gardsil (HPV)  2011  8:54AM   

 

 Irregular Menses  Aug 31 2011  9:07AM   

 

 Family Planning  Aug 31 2011  9:07AM   

 

 Contraceptive Management  Andreas  3 2012  9:43AM   

 

 Local Infection  Andreas  3 2012  9:43AM   

 

 Well Child Examination  2012  1:46PM   

 

 Right knee pain  2012  1:46PM   

 

 Right knee pain  2012  1:24PM   

 

 Abscess  2012  1:24PM   

 

 Right knee pain  Mar 26 2012 10:32AM   

 

 General Medical Exam, Adult  2012  1:46PM   

 

 Pregnancy test confirmed positive  2016 11:37AM   

 

 Obesity  2016 11:37AM   

 

 Normal pregnancy in multigravida in second trimester  Mar 28 2016  2:01PM   

 

 History of atrial septal defect repair  May 11 2016  8:50AM   

 

 Normal pregnancy in multigravida in second trimester  2016  2:00PM   







Payers







 Insurance Name  Company Name  Plan Name  Plan Number  Policy Number  Policy 
Group Number  Start Date

 

    [facility]  
   [id number]     N/A

 

    [place] [employer]  [employer] [employer]     
[id number]     N/A

 

    [id number] Medicare A  Test Medicare A     void     N/A







History of Encounters







 Visit Date  Visit Type  Provider

 

 2016  Office visit  Dr. Antonella Ventura MD

 

 2016  Office visit  Dr. Antonella Ventura MD

 

 3/28/2016  Office visit  Dr. Antonella Ventura MD

 

 2016  Office visit  Dr. Antonella Ventura MD

 

 2016  Office visit  Dr. Antonella Ventura MD

 

 2016  Office visit  Velma Mendieta APRN

 

 3/26/2012  Office visit  Ivanna Swan APRN

 

 2012  Office visit  Ivanna Swan APRN

 

 2012  Office visit  Ivanna Swan APRN

 

 1/3/2012  Office visit  Ivanna Swan APRN

 

 2011  Nurse visit  Ivanna Swan APRN

 

 2011  Nurse visit  Ivanna Swan APRN

 

 7/15/2011  Office visit  Ivanna Swan APRN

 

 2011  Office visit  Ivanna Swan APRN

 

 12/15/2010  Office visit  Willard Sorensen MD

 

 2010  Surgery  Willard Sorensen MD

 

 2010  [facility]  Willard Sorensen MD

 

 11/15/2010  Office visit  Willard Sorensen MD

 

 11/15/2010  [facility]  Willard Sorensen MD

 

 2010  Office visit  Willard Sorensen MD

 

 2010  Office visit  Willard Sorensen MD

 

 10/28/2010  Office visit  Willard Sorensen MD

 

 10/21/2010  Office visit  Willard Sorensen MD

 

 10/14/2010  Office visit  Willard Sorensen MD

 

 10/9/2010  [facility]  Willard Sorensen MD

 

 10/7/2010  Office visit  Willard Sorensen MD

 

 2010  Office visit  Willard Sorensen MD

 

 2010  Office visit  Willard Sorensen MD

 

 2010  Office visit  Willard Sorensen MD

 

 2010  Office visit  Willard Sorensen MD

 

 2010  Office visit  Willard Sorensen MD

 

 6/10/2010  Office visit  Willard Sorensen MD

 

 2010  Office visit  Willard Sorensen MD

 

 2010  Office visit  Willard Sorensen MD

 

 4/15/2010  Office visit  Willard Sorensen MD

## 2018-11-29 NOTE — XMS REPORT
MU2 Ambulatory Summary

 Created on: 2016



Radha Aviles

External Reference #: [id number]

: 1990

Sex: Female



Demographics







 Address  [address] [place], [place] [address] [address]

[place]  [address]

 

 Home Phone  [phone]

 

 Preferred Language  English

 

 Marital Status  Never 

 

 Buddhist Affiliation  Unknown

 

 Race  White

 

 Ethnic Group  Not  or 





Author







 Author  Antoenlla Ventura

 

 [facility]

 

 Address  [address]

[place], [place]  [id number]



 

 Phone  [phone]







Care Team Providers







 Care Team Member Name  Role  Phone

 

 Antonella Ventura  PCP  Unavailable







Allergies and Adverse Reactions







 Name  Reaction  Notes

 

 NO KNOWN DRUG ALLERGIES      







Plan of Treatment

Not available.



Medications







  

 

 Name  Start Date  Expiration Date  SIG  Comments

 

 [place] 60 mg iron-1 mg oral tablet  4/15/2010  3/11/2011  take 1 tablet by 
oral route once daily for 30 days   

 

 Diflucan 150 mg oral tablet  2010  take 1 tablet (150 mg) by 
oral route once  for 1 days   

 

 Premarin 1.25 mg oral tablet  2011  take 1 tablet by oral route 
daily for 10 days   

 

 doxycycline hyclate 100 mg oral tablet  7/15/2011  2011  take 1 tablet (
100 mg) by oral route every 12 hours for 10 days   

 

 ibuprofen 800 mg oral tablet  2012  take 1 tablet by oral route 
3 times a day for 30 days   

 

 Bactrim -160 mg oral tablet  2012  take 1 tablet by oral 
route every 12 hours for 7 days   

 

 Macrobid 100 mg oral capsule  2016  take 1 capsule (100 mg) by 
oral route 2 times per day with food for 7 days   









 Discontinued 

 

 Name  Start Date  Discontinued Date  SIG  Comments

 

 atenolol 25 mg oral tablet     2011  take 1/2 tablet by oral route QD   

 

 Medrol (Antwan) 4 mg oral tablets,dose pack  2012  3/26/2012  take as 
directed   







Problem List







 Description  Status  Onset

 

 Prenatal Care, High Risk Pregnancy  Active  2010







Vital Signs







 Date  Time  BP-Sys(mm[Hg]  BP-Marisa(mm[Hg])  HR(bpm)  RR(rpm)  Temp  WT  HT  HC  
BMI  BSA  BMI Percentile  O2 Sat(%)

 

 2016  11:15:00 AM  130 mmHg  70 mmHg  80 bpm  20 rpm     214 lbs  64 in  
   36.73 kg/m2  2.09 m2      

 

 3/26/2012  10:30:00 AM  126 mmHg  62 mmHg  106 bpm  18 rpm  96.8 F  205 lbs  
64 in     35.1878 kg/m  2.0491 m     97 %

 

 2012  1:22:00 PM  120 mmHg  68 mmHg  66 bpm  18 rpm  97 F  202.375 lbs  
64 in     34.74 kg/m2  2.04 m2      

 

 2012  1:44:00 PM  122 mmHg  64 mmHg  68 bpm  18 rpm  98.2 F  198.125 lbs  
64 in     34.0077 kg/m  2.0145 m      

 

 1/3/2012  9:40:00 AM  103 mmHg  77 mmHg  64 bpm  18 rpm  97.7 F  197.125 lbs  
64 in     33.84 kg/m2  2.01 m2      

 

 2011  9:06:00 AM  118 mmHg  68 mmHg  74 bpm     97.4 F  189 lbs          
        

 

 7/15/2011  10:06:00 AM  126 mmHg  70 mmHg  73 bpm  20 rpm  97.9 F  180.5 lbs  
64 in     30.98 kg/m2  1.92 m2  0 %  99 %

 

 2011  8:56:00 AM  96 mmHg  68 mmHg  66 bpm  20 rpm  97.5 F  177.5 lbs  64 
in     30.4675 kg/m  1.9067 m  0 %  98 %

 

 12/15/2010  10:43:00 AM  104 mmHg  64 mmHg  48 bpm     97.8 F  150 lbs        
          

 

 2010  9:15:00 AM  112 mmHg  72 mmHg  49 bpm     98 F  149 lbs  64 in     
25.5755 kg/m  1.747 m  81.5 %   

 

 2010  8:54:00 AM  117 mmHg  65 mmHg  73 bpm  20 rpm  97.8 F  160.5 lbs   
               

 

 2010  9:42:00 AM  134 mmHg  66 mmHg  66 bpm        161 lbs               
   

 

 4/15/2010  4:02:00 PM  135 mmHg  71 mmHg  77 bpm        162 lbs  64 in     
27.81 kg/m2  1.82 m2  89.9 %   







Social History







 Name  Description  Comments

 

 History of tobacco usage     Quit smoking in January - was smoking 1-2cig/day

 

 denies alcohol use      

 

 Tobacco  Former smoker   







History of Procedures







 Date Ordered  Description  Order Status

 

 2011 12:00 AM  URINE PREGNANCY TEST  Reviewed

 

 2011 12:00 AM  IMMUNIZATION ADMIN  Reviewed

 

 2011 12:00 AM  HPV VACCINE 4 VALENT IM  Reviewed

 

 2016 12:00 AM  CYTOPATH C/V THIN LAYER  Returned

 

 2016 12:00 AM  SPECIMEN HANDLING OFFICE-LAB  Reviewed

 

 2016 12:00 AM  N.GONORRHOEAE DNA AMP PROB  Returned

 

 2016 12:00 AM  CHLAMYDIA CULTURE  Returned

 

 2016 12:00 AM  HIV-1ANTIBODY  Returned

 

 2016 12:00 AM  URINALYSIS AUTO W/SCOPE  Returned

 

 2016 12:00 AM  OBSTETRIC PANEL  Returned

 

 2016 12:00 AM  GLUCOSE TOLERANCE TEST (GTT)  Returned

 

 2012 12:00 AM  X-RAY EXAM KNEE 4 OR MORE  Returned

 

 4/15/2010 12:00 AM  OBSTETRIC PANEL  Reviewed

 

 4/15/2010 12:00 AM  HIV-1ANTIBODY  Reviewed

 

 4/15/2010 12:00 AM  URINALYSIS NONAUTO W/SCOPE  Reviewed

 

 4/15/2010 12:00 AM  OB US < 14 WKS SINGLE FETUS  Reviewed

 

 2010 12:00 AM  GLUCOSE TEST  Reviewed

 

 2010 12:00 AM  Rhogam  Reviewed

 

 2010 12:00 AM  Type and screen  Reviewed

 

 2010 12:00 AM  COMPLETE CBC W/AUTO DIFF WBC  Reviewed

 

 2010 12:00 AM  COMPLETE CBC W/AUTO DIFF WBC  Reviewed

 

 10/14/2010 12:00 AM  CULTURE OTHR SPECIMN AEROBIC  Reviewed

 

 12/15/2010 12:00 AM  INSERTION IMPLANON, IMPLANTABLE CONTRACEPTIVE CAPSULES  
Reviewed

 

 12/15/2010 12:00 AM  Etonogestrel (contraceptive) implant system, (Implanon)  
Reviewed

 

 7/15/2011 12:00 AM  CYTOPATH TBS C/V MANUAL  Reviewed

 

 7/15/2011 12:00 AM  CHLAMYDIA CULTURE  Reviewed

 

 7/15/2011 12:00 AM  N.GONORRHOEAE DNA AMP PROB  Reviewed

 

 7/15/2011 12:00 AM  URINE PREGNANCY TEST  Reviewed

 

 2011 12:00 AM  IMMUNIZATION ADMIN  Reviewed

 

 2011 12:00 AM  HPV VACCINE 4 VALENT IM  Reviewed







Results Summary







 Data and Description  Results

 

 2010 10:41 AM  RUBELLA 34.0 IU/mLCOLOR YELLOW APPEARANCE HAZY SPEC GRAV 
1.025 pH 7.0 PROTEIN TRACE GLUCOSE NEGATIVE KETONE 15 BILIRUBIN NEGATIVE BLOOD 
NEGATIVE NITRITE NEGATIVE LEUK SCREEN NEGATIVE CASTS/LPF NEGATIVE CRYSTALS 
TRACE AMORPH MUCOUS THRDS FEW BACTERIA NEGATIVE EPITH CELLS FEW SQUAMOUS 
TRICHOMONAS NEGATIVE YEAST NEGATIVE WBC 6.3 RBC 4.57 HGB 13.90 g/dLHCT 40.30 %
MCV 88.0 fLMCH 30.40 pgMCHC 34.50 g/dLMPV 9.20 fLPLT 258 %NEUT 65.10 %%LYMP 
28.30 %#NEUT 4.10 #LYMP 1.80 

 

 2010 3:48 PM  WBC 12.9 RBC 4.02 HGB 12.60 g/dLHCT 37.40 %MCV 93.0 fLMCH 
31.30 pgMCHC 33.70 g/dLRDW CV 14.10 %MPV 9.50 fLPLT 263 %NEUT 69.20 %%LYMP 
22.30 %%MONO 7.60 %%EOS 0.70 %%BASO 0.20 %#NEUT 8.91 #LYMP 2.88 #MONO 0.98 #EOS 
0.09 #BASO 0.03 

 

 10/22/2010 8:21 PM  COLOR YELLOW APPEARANCE CLEAR SPEC GRAV 1.010 pH 6.5 
PROTEIN NEGATIVE GLUCOSE NEGATIVE KETONE 15 BILIRUBIN NEGATIVE BLOOD NEGATIVE 
NITRITE NEGATIVE LEUK SCREEN SMALL CASTS/LPF NEGATIVE CRYSTALS NEGATIVE MUCOUS 
THRDS NEGATIVE BACTERIA 2++ EPITH CELLS 3+++ SQUAMOUS TRICHOMONAS NEGATIVE 
YEAST NEGATIVE 

 

 2010 11:58 PM  COLOR YELLOW APPEARANCE CLEAR SPEC GRAV 1.015 pH 6.5 
PROTEIN NEGATIVE GLUCOSE NEGATIVE KETONE NEGATIVE BILIRUBIN NEGATIVE BLOOD 
NEGATIVE NITRITE NEGATIVE LEUK SCREEN LARGE CASTS/LPF NEGATIVE CRYSTALS 
NEGATIVE MUCOUS THRDS NEGATIVE BACTERIA 2++ EPITH CELLS 2++ SQUAMOUS 
TRICHOMONAS NEGATIVE YEAST NEGATIVE 

 

 11/15/2010 5:30 PM  .0 IU/LURIC ACID 3.7 mg/dLGLUCOSE 66.0 mg/dLSODIUM 
137.0 mmol/LPOTASSIUM 3.10 mmol/LCHLORIDE 105.0 mmol/LCO2 21.0 mmol/LBUN 3.0 mg/
dLCREATININE 0.50 mg/dLSGOT/AST 20.0 IU/LSGPT/ALT 18.0 IU/LALK PHOS 152.0 IU/
LTOTAL PROTEIN 6.20 g/dLALBUMIN 3.40 g/dLTOTAL BILI 0.60 mg/dLCALCIUM 9.10 mg/
dLeGFR >60 mL/min/1.73 m2WBC 16.9 RBC 4.18 HGB 13.30 g/dLHCT 39.70 %MCV 95.0 
fLMCH 31.80 pgMCHC 33.50 g/dLRDW CV 14.0 %MPV 10.10 fLPLT 268 %NEUT 75.70 %%
LYMP 17.50 %%MONO 6.20 %%EOS 0.40 %%BASO 0.20 %#NEUT 12.82 #LYMP 2.97 #MONO 
1.05 #EOS 0.06 #BASO 0.03 

 

 11/15/2010 8:50 PM  PROTEIN UR 14.0 mg/dL

 

 2016 12:21 PM  Pap Smear Collected 

 

 2016 2:00 PM  WBC 9.8 RBC 4.60 HGB 13.60 g/dLHCT 41.40 %MCV 90.0 fLMCH 
29.60 pgMCHC 32.90 g/dLRDW CV 13.80 %MPV 9.70 fLPLT 271 %NEUT 59.80 %%LYMP 33.0 
%%MONO 6.40 %%EOS 0.60 %%BASO 0.20 %#NEUT 5.83 #LYMP 3.22 #MONO 0.62 #EOS 0.06 #
BASO 0.02 HIV AG/AB COMBO 0.14 RPR Non Reactive Rubella Antibodies, IgG 2.88 
Index

 

 2016 2:08 PM  COLOR YELLOW APPEARANCE CLEAR SPEC GRAV <=1.005 pH 6.0 
PROTEIN NEGATIVE GLUCOSE NEGATIVE mg/dLKETONE NEGATIVE BILIRUBIN NEGATIVE BLOOD 
NEGATIVE NITRITE NEGATIVE LEUK SCREEN TRACE CASTS/LPF NEGATIVE /LPFCRYSTALS 
NEGATIVE MUCOUS THRDS NEGATIVE BACTERIA NEGATIVE EPITH CELLS 3+++ SQUAMOUS /
HPFTRICHOMONAS NEGATIVE YEAST NEGATIVE 







History Of Immunizations







 Name  Date Admin  Mfg Name  Mfg Code  Trade Name  Lot#  Route  Inj  Vis Given  
Vis Pub  CVX

 

 HPV  2011  [employer]  MSD  GARDASIL  [id number]  Intramuscular  Left 
Deltoid  2011  999

 

 HPV  2011  [employer]  MSD  GARDASIL  [id number]  Intramuscular  Left 
Deltoid  2011  62







History of Past Illness







 Name  Date of Onset  Comments

 

 Atrial Septal Defect, Ostium Secundum Type      

 

 Prenatal Care, High Risk Pregnancy  2010   

 

    Apr 15 2010  4:29PM   

 

 Prenatal Care, High Risk Pregnancy  Apr 15 2010  4:13PM   

 

    2010  9:54AM   

 

 Prenatal Care, High Risk Pregnancy  2010 10:10AM   

 

    May 13 2010  9:01AM   

 

    John 10 2010  2:45PM   

 

    2010  4:16PM   

 

 Pregnancy, First Normal  Aug 30 2010  9:14AM   

 

 Pregnancy, High Risk  Sep 13 2010  3:13PM   

 

 Group B Strep Screening,   Oct 14 2010  5:43PM   

 

 Pregnancy, High Risk  Oct 14 2010  5:43PM   

 

 Postoperative Examination Following Surgery  Dec  6 2010  9:15AM   

 

 IMPLANON  Insertion  Dec 15 2010 10:44AM   

 

 General Medical Exam, Child  2011  8:58AM   

 

 Contraceptive Management  2011  8:58AM   

 

 Routine gynecological examination  Jul 15 2011 10:03AM   

 

 Contraceptive Counseling  Jul 15 2011 10:03AM   

 

 Vaginal Discharge  Jul 15 2011 10:03AM   

 

 Irregular Menses  Jul 15 2011 10:03AM   

 

 G[place]il (HPV)  2011  8:54AM   

 

 Irregular Menses  Aug 31 2011  9:07AM   

 

 Family Planning  Aug 31 2011  9:07AM   

 

 Contraceptive Management  Andreas  3 2012  9:43AM   

 

 Local Infection  Andreas  3 2012  9:43AM   

 

 Well Child Examination  2012  1:46PM   

 

 Right knee pain  2012  1:46PM   

 

 Right knee pain  2012  1:24PM   

 

 Abscess  2012  1:24PM   

 

 Right knee pain  Mar 26 2012 10:32AM   

 

 General Medical Exam, Adult  2012  1:46PM   

 

 Pregnancy test confirmed positive  2016 11:37AM   

 

 Obesity  2016 11:37AM   







Payers







 Insurance Name  Company Name  Plan Name  Plan Number  Policy Number  Policy 
Group Number  Start Date

 

    [employer] [employer]  [employer] [facility]     
[id number]     N/A

 

    [facility]     void     N/A







History of Encounters







 Visit Date  Visit Type  Provider

 

 2016  Office visit  Dr. Antonella Ventura MD

 

 2016  Office visit  Dr. Antonella Ventura MD

 

 2016  Office visit  Velma Mendieta APRN

 

 3/26/2012  Office visit  Ivanna Swan APRN

 

 2012  Office visit  Ivanna Swan APRN

 

 2012  Office visit  Ivanna Swan APRN

 

 1/3/2012  Office visit  Ivanna Swan APRN

 

 2011  Nurse visit  Ivanna Swan APRN

 

 2011  Nurse visit  Ivanna Swan APRN

 

 7/15/2011  Office visit  Ivanna Swan APRN

 

 2011  Office visit  Ivanna Swan APRN

 

 12/15/2010  Office visit  Willard Sorensen MD

 

 2010  Surgery  Willard Sorensen MD

 

 2010  [facility]  Willard Sorensen MD

 

 11/15/2010  Office visit  Willard Sorensen MD

 

 11/15/2010  [facility]  Willard Sorensen MD

 

 2010  Office visit  Willard Sorensen MD

 

 2010  Office visit  Willard Sorensen MD

 

 10/28/2010  Office visit  Willard Sorensen MD

 

 10/21/2010  Office visit  Willard Sorensen MD

 

 10/14/2010  Office visit  Willard Sorensen MD

 

 10/9/2010  [facility]  Willard Sorensen MD

 

 10/7/2010  Office visit  Willard Sorensen MD

 

 2010  Office visit  Willard Sorensen MD

 

 2010  Office visit  Willard Sorensen MD

 

 2010  Office visit  Willard Sorensen MD

 

 2010  Office visit  Willard Sorensen MD

 

 2010  Office visit  Willard Sorensen MD

 

 6/10/2010  Office visit  Willard Sorensen MD

 

 2010  Office visit  Willard Sorensen MD

 

 2010  Office visit  Willard Sorensen MD

 

 4/15/2010  Office visit  Willard Sorensen MD

## 2018-11-29 NOTE — XMS REPORT
MU2 Ambulatory Summary

 Created on: 10/24/2016



Radha Aviles

External Reference #: [id number]

: 1990

Sex: Female



Demographics







 Address  [facility] [facility]

[place]  [address]

 

 Home Phone  [phone]

 

 Preferred Language  English

 

 Marital Status  

 

 Yazdanism Affiliation  Unknown

 

 Race  White

 

 Ethnic Group  Not  or 





Author







 Author  Antonella Ventura

 

 [facility]

 

 Address  [address]

[place], [place]  [id number]



 

 Phone  [phone]







Care Team Providers







 Care Team Member Name  Role  Phone

 

 Antonella Ventura  PCP  Unavailable







Allergies and Adverse Reactions







 Name  Reaction  Notes

 

 NO KNOWN DRUG ALLERGIES      







Plan of Treatment

Not available.



Medications







 Active 

 

 Name  Start Date  Estimated Completion Date  SIG  Comments

 

 ferrous sulfate 325 mg (65 mg iron) oral tablet        take 1 tablet by oral 
route 2 times a day   

 

 docusate sodium 100 mg oral capsule        take 1 capsule (100 mg) by oral 
route once daily   

 

 Prenatal Vitamin oral tablet        take 1 tablet by oral route once daily   









  

 

 Name  Start Date  Expiration Date  SIG  Comments

 

 [place] 60 mg iron-1 mg oral tablet  4/15/2010  3/11/2011  take 1 tablet by 
oral route once daily for 30 days   

 

 Diflucan 150 mg oral tablet  2010  take 1 tablet (150 mg) by 
oral route once  for 1 days   

 

 Premarin 1.25 mg oral tablet  2011  take 1 tablet by oral route 
daily for 10 days   

 

 doxycycline hyclate 100 mg oral tablet  7/15/2011  2011  take 1 tablet (
100 mg) by oral route every 12 hours for 10 days   

 

 ibuprofen 800 mg oral tablet  2012  take 1 tablet by oral route 
3 times a day for 30 days   

 

 Bactrim -160 mg oral tablet  2012  take 1 tablet by oral 
route every 12 hours for 7 days   

 

 Macrobid 100 mg oral capsule  2016  take 1 capsule (100 mg) by 
oral route 2 times per day with food for 7 days   









 Discontinued 

 

 Name  Start Date  Discontinued Date  SIG  Comments

 

 atenolol 25 mg oral tablet     2011  take 1/2 tablet by oral route QD   

 

 Medrol (Antwan) 4 mg oral tablets,dose pack  2012  3/26/2012  take as 
directed   

 

 oxycodone-acetaminophen 5-325 mg oral tablet     2016  take 1 tablet by 
oral route every 6 hours as needed   

 

 Norco 5-325 mg oral tablet  2016  10/24/2016  take 1 tablet by oral route 
every 6 hours as needed for pain   







Problem List







 Description  Status  Onset

 

 Prenatal Care, High Risk Pregnancy  Active  2010







Vital Signs







 Date  Time  BP-Sys(mm[Hg]  BP-Marisa(mm[Hg])  HR(bpm)  RR(rpm)  Temp  WT  HT  HC  
BMI  BSA  BMI Percentile  O2 Sat(%)

 

 10/24/2016  10:44:00 AM  121 mmHg  75 mmHg  55 bpm     98.6 F  168 lbs  64 in 
    28.84 kg/m2  1.85 m2      

 

 2016  9:20:00 AM  131 mmHg  88 mmHg  81 bpm     97.4 F                   
  

 

 2016  3:19:00 PM  121 mmHg  59 mmHg  91 bpm        178 lbs  64 in     
30.55 kg/m2  1.91 m2      

 

 2016  11:15:00 AM  130 mmHg  70 mmHg  80 bpm  20 rpm     214 lbs  64 in  
   36.73 kg/m2  2.0936 m      

 

 3/26/2012  10:30:00 AM  126 mmHg  62 mmHg  106 bpm  18 rpm  96.8 F  205 lbs  
64 in     35.1878 kg/m  2.05 m2     97 %

 

 2012  1:22:00 PM  120 mmHg  68 mmHg  66 bpm  18 rpm  97 F  202.375 lbs  
64 in     34.74 kg/m2  2.036 m      

 

 2012  1:44:00 PM  122 mmHg  64 mmHg  68 bpm  18 rpm  98.2 F  198.125 lbs  
64 in     34.0077 kg/m  2.01 m2      

 

 1/3/2012  9:40:00 AM  103 mmHg  77 mmHg  64 bpm  18 rpm  97.7 F  197.125 lbs  
64 in     33.84 kg/m2  2.0094 m      

 

 2011  9:06:00 AM  118 mmHg  68 mmHg  74 bpm     97.4 F  189 lbs          
        

 

 7/15/2011  10:06:00 AM  126 mmHg  70 mmHg  73 bpm  20 rpm  97.9 F  180.5 lbs  
64 in     30.98 kg/m2  1.9228 m  0 %  99 %

 

 2011  8:56:00 AM  96 mmHg  68 mmHg  66 bpm  20 rpm  97.5 F  177.5 lbs  64 
in     30.4675 kg/m  1.91 m2  0 %  98 %

 

 12/15/2010  10:43:00 AM  104 mmHg  64 mmHg  48 bpm     97.8 F  150 lbs        
          

 

 2010  9:15:00 AM  112 mmHg  72 mmHg  49 bpm     98 F  149 lbs  64 in     
25.5755 kg/m  1.75 m2  81.5 %   

 

 2010  8:54:00 AM  117 mmHg  65 mmHg  73 bpm  20 rpm  97.8 F  160.5 lbs   
               

 

 2010  9:42:00 AM  134 mmHg  66 mmHg  66 bpm        161 lbs               
   

 

 4/15/2010  4:02:00 PM  135 mmHg  71 mmHg  77 bpm        162 lbs  64 in     
27.81 kg/m2  1.82 m2  89.9 %   







Social History







 Name  Description  Comments

 

 History of tobacco usage     Quit smoking in January - was smoking 1-2cig/day

 

 denies alcohol use      

 

 Tobacco  Former smoker   







History of Procedures







 Date Ordered  Description  Order Status

 

 2011 12:00 AM  URINE PREGNANCY TEST  Reviewed

 

 2011 12:00 AM  IMMUNIZATION ADMIN  Reviewed

 

 2011 12:00 AM  HPV VACCINE 4 VALENT IM  Reviewed

 

 2016 12:00 AM  CYTOPATH C/V THIN LAYER  Returned

 

 2016 12:00 AM  SPECIMEN HANDLING OFFICE-LAB  Reviewed

 

 2016 12:00 AM  N.GONORRHOEAE DNA AMP PROB  Returned

 

 2016 12:00 AM  CHLAMYDIA CULTURE  Returned

 

 2016 12:00 AM  HIV-1ANTIBODY  Returned

 

 2016 12:00 AM  URINALYSIS AUTO W/SCOPE  Returned

 

 2016 12:00 AM  OBSTETRIC PANEL  Returned

 

 2016 12:00 AM  GLUCOSE TOLERANCE TEST (GTT)  Returned

 

 3/28/2016 12:00 AM  ALPHA-FETOPROTEIN SERUM  Returned

 

 3/28/2016 12:00 AM  CHORIONIC GONADOTROPIN TEST  Returned

 

 3/28/2016 12:00 AM  CHORIONIC GONADOTROPIN ASSAY  Returned

 

 5/3/2016 12:00 AM  OB US >/=14 WKS SNGL FETUS  Returned

 

 2016 12:00 AM  RH IG FULL-DOSE IM  Returned

 

 2016 12:00 AM  Type and screen  Returned

 

 2016 12:00 AM  GLUCOSE TOLERANCE TEST (GTT)  Returned

 

 2016 12:00 AM  COMPLETE CBC W/AUTO DIFF WBC  Returned

 

 2012 12:00 AM  X-RAY EXAM KNEE 4 OR MORE  Returned

 

 2016 12:00 AM  TDAP VACCINE 7 YRS/> IM  Reviewed

 

 2016 12:00 AM  IMMUNIZATION ADMIN  Reviewed

 

 2016 12:00 AM  CULTURE SCREEN ONLY  Returned

 

 2016 12:00 AM  OB US LIMITED FETUS(S)  Returned

 

 2016 12:00 AM  OB US LIMITED FETUS(S)  Reviewed

 

 4/15/2010 12:00 AM  OBSTETRIC PANEL  Reviewed

 

 4/15/2010 12:00 AM  HIV-1ANTIBODY  Reviewed

 

 4/15/2010 12:00 AM  URINALYSIS NONAUTO W/SCOPE  Reviewed

 

 4/15/2010 12:00 AM  OB US < 14 WKS SINGLE FETUS  Reviewed

 

 2010 12:00 AM  GLUCOSE TEST  Reviewed

 

 2010 12:00 AM  Rhogam  Reviewed

 

 2010 12:00 AM  Type and screen  Reviewed

 

 2010 12:00 AM  COMPLETE CBC W/AUTO DIFF WBC  Reviewed

 

 2010 12:00 AM  COMPLETE CBC W/AUTO DIFF WBC  Reviewed

 

 10/14/2010 12:00 AM  CULTURE OTHR SPECIMN AEROBIC  Reviewed

 

 12/15/2010 12:00 AM  INSERTION IMPLANON, IMPLANTABLE CONTRACEPTIVE CAPSULES  
Reviewed

 

 12/15/2010 12:00 AM  Etonogestrel (contraceptive) implant system, (Implanon)  
Reviewed

 

 7/15/2011 12:00 AM  CYTOPATH TBS C/V MANUAL  Reviewed

 

 7/15/2011 12:00 AM  CHLAMYDIA CULTURE  Reviewed

 

 7/15/2011 12:00 AM  N.GONORRHOEAE DNA AMP PROB  Reviewed

 

 7/15/2011 12:00 AM  URINE PREGNANCY TEST  Reviewed

 

 2011 12:00 AM  IMMUNIZATION ADMIN  Reviewed

 

 2011 12:00 AM  HPV VACCINE 4 VALENT IM  Reviewed







Results Summary







 Data and Description  Results

 

 2010 10:41 AM  RUBELLA 34.0 IU/mLCOLOR YELLOW APPEARANCE HAZY SPEC GRAV 
1.025 pH 7.0 PROTEIN TRACE GLUCOSE NEGATIVE KETONE 15 BILIRUBIN NEGATIVE BLOOD 
NEGATIVE NITRITE NEGATIVE LEUK SCREEN NEGATIVE CASTS/LPF NEGATIVE CRYSTALS 
TRACE AMORPH MUCOUS THRDS FEW BACTERIA NEGATIVE EPITH CELLS FEW SQUAMOUS 
TRICHOMONAS NEGATIVE YEAST NEGATIVE WBC 6.3 RBC 4.57 HGB 13.90 g/dLHCT 40.30 %
MCV 88.0 fLMCH 30.40 pgMCHC 34.50 g/dLMPV 9.20 fLPLT 258 %NEUT 65.10 %%LYMP 
28.30 %#NEUT 4.10 #LYMP 1.80 

 

 2010 3:48 PM  WBC 12.9 RBC 4.02 HGB 12.60 g/dLHCT 37.40 %MCV 93.0 fLMCH 
31.30 pgMCHC 33.70 g/dLRDW CV 14.10 %MPV 9.50 fLPLT 263 %NEUT 69.20 %%LYMP 
22.30 %%MONO 7.60 %%EOS 0.70 %%BASO 0.20 %#NEUT 8.91 #LYMP 2.88 #MONO 0.98 #EOS 
0.09 #BASO 0.03 

 

 10/22/2010 8:21 PM  COLOR YELLOW APPEARANCE CLEAR SPEC GRAV 1.010 pH 6.5 
PROTEIN NEGATIVE GLUCOSE NEGATIVE KETONE 15 BILIRUBIN NEGATIVE BLOOD NEGATIVE 
NITRITE NEGATIVE LEUK SCREEN SMALL CASTS/LPF NEGATIVE CRYSTALS NEGATIVE MUCOUS 
THRDS NEGATIVE BACTERIA 2++ EPITH CELLS 3+++ SQUAMOUS TRICHOMONAS NEGATIVE 
YEAST NEGATIVE 

 

 2010 11:58 PM  COLOR YELLOW APPEARANCE CLEAR SPEC GRAV 1.015 pH 6.5 
PROTEIN NEGATIVE GLUCOSE NEGATIVE KETONE NEGATIVE BILIRUBIN NEGATIVE BLOOD 
NEGATIVE NITRITE NEGATIVE LEUK SCREEN LARGE CASTS/LPF NEGATIVE CRYSTALS 
NEGATIVE MUCOUS THRDS NEGATIVE BACTERIA 2++ EPITH CELLS 2++ SQUAMOUS 
TRICHOMONAS NEGATIVE YEAST NEGATIVE 

 

 11/15/2010 5:30 PM  .0 IU/LURIC ACID 3.7 mg/dLGLUCOSE 66.0 mg/dLSODIUM 
137.0 mmol/LPOTASSIUM 3.10 mmol/LCHLORIDE 105.0 mmol/LCO2 21.0 mmol/LBUN 3.0 mg/
dLCREATININE 0.50 mg/dLSGOT/AST 20.0 IU/LSGPT/ALT 18.0 IU/LALK PHOS 152.0 IU/
LTOTAL PROTEIN 6.20 g/dLALBUMIN 3.40 g/dLTOTAL BILI 0.60 mg/dLCALCIUM 9.10 mg/
dLeGFR >60 mL/min/1.73 m2WBC 16.9 RBC 4.18 HGB 13.30 g/dLHCT 39.70 %MCV 95.0 
fLMCH 31.80 pgMCHC 33.50 g/dLRDW CV 14.0 %MPV 10.10 fLPLT 268 %NEUT 75.70 %%
LYMP 17.50 %%MONO 6.20 %%EOS 0.40 %%BASO 0.20 %#NEUT 12.82 #LYMP 2.97 #MONO 
1.05 #EOS 0.06 #BASO 0.03 

 

 11/15/2010 8:50 PM  PROTEIN UR 14.0 mg/dL

 

 2016 12:21 PM  Pap Smear Collected 

 

 2016 2:00 PM  WBC 9.8 RBC 4.60 HGB 13.60 g/dLHCT 41.40 %MCV 90.0 fLMCH 
29.60 pgMCHC 32.90 g/dLRDW CV 13.80 %MPV 9.70 fLPLT 271 %NEUT 59.80 %%LYMP 33.0 
%%MONO 6.40 %%EOS 0.60 %%BASO 0.20 %#NEUT 5.83 #LYMP 3.22 #MONO 0.62 #EOS 0.06 #
BASO 0.02 HIV AG/AB COMBO 0.14 RPR Non Reactive Rubella Antibodies, IgG 2.88 
Index

 

 2016 2:08 PM  COLOR YELLOW APPEARANCE CLEAR SPEC GRAV <=1.005 pH 6.0 
PROTEIN NEGATIVE GLUCOSE NEGATIVE mg/dLKETONE NEGATIVE BILIRUBIN NEGATIVE BLOOD 
NEGATIVE NITRITE NEGATIVE LEUK SCREEN TRACE CASTS/LPF NEGATIVE /LPFCRYSTALS 
NEGATIVE MUCOUS THRDS NEGATIVE BACTERIA NEGATIVE EPITH CELLS 3+++ SQUAMOUS /
HPFTRICHOMONAS NEGATIVE YEAST NEGATIVE 

 

 3/28/2016 2:18 PM  AFP Value 0.0240 ug/mLAFP MoM 1.02 hCG Value 45737.0 mIU/
mLhCG MoM 1.77 uE3 Value 0.70 ng/mLuE3 MoM 1.16 MARISA Value 207.520 pg/mLDIA MoM 
1.25 OSBR Risk       1 [place] [address] DSR (Second Trimester) 1IN 4435 DSR (By Age)    
1 IN 1003 T18 Risk Not increased T18 (By Age) 1:3907 

 

 2016 3:15 PM  WBC 11.5 RBC 3.75 HGB 11.70 g/dLHCT 35.50 %MCV 95.0 fLMCH 
31.20 pgMCHC 33.0 g/dLRDW CV 14.10 %MPV 9.70 fLPLT 273 %NEUT 73.0 %%LYMP 20.10 %
%MONO 5.70 %%EOS 0.80 %%BASO 0.10 %#NEUT 8.40 #LYMP 2.31 #MONO 0.65 #EOS 0.09 #
BASO 0.01 







History Of Immunizations







 Name  Date Admin  Mfg Name  Mfg Code  Trade Name  Lot#  Route  Inj  Vis Given  
Vis Pub  CVX

 

 HPV  2011  [employer]  MSD  GARDASIL  [id number]  Intramuscular  Left 
Deltoid  2011  999

 

 HPV  2011  [employer]  MSD  GARDASIL  [id number]  Intramuscular  Left 
Deltoid  2011  62

 

 Tdap  2016  GlaxWelltok  SKB  BOOSTRIX  [id number]  Intramuscular  Right 
Deltoid  2016  115







History of Past Illness







 Name  Date of Onset  Comments

 

 Atrial Septal Defect, Ostium Secundum Type      

 

 Prenatal Care, High Risk Pregnancy  2010   

 

    Apr 15 2010  4:29PM   

 

 Prenatal Care, High Risk Pregnancy  Apr 15 2010  4:13PM   

 

    2010  9:54AM   

 

 Prenatal Care, High Risk Pregnancy  2010 10:10AM   

 

    May 13 2010  9:01AM   

 

    John 10 2010  2:45PM   

 

    2010  4:16PM   

 

 Pregnancy, First Normal  Aug 30 2010  9:14AM   

 

 Pregnancy, High Risk  Sep 13 2010  3:13PM   

 

 Group B Strep Screening,   Oct 14 2010  5:43PM   

 

 Pregnancy, High Risk  Oct 14 2010  5:43PM   

 

 Postoperative Examination Following Surgery  Dec  6 2010  9:15AM   

 

 IMPLANON  Insertion  Dec 15 2010 10:44AM   

 

 General Medical Exam, Child  2011  8:58AM   

 

 Contraceptive Management  2011  8:58AM   

 

 Routine gynecological examination  Jul 15 2011 10:03AM   

 

 Contraceptive Counseling  Jul 15 2011 10:03AM   

 

 Vaginal Discharge  Jul 15 2011 10:03AM   

 

 Irregular Menses  Jul 15 2011 10:03AM   

 

 Gardsil (HPV)  2011  8:54AM   

 

 Irregular Menses  Aug 31 2011  9:07AM   

 

 Family Planning  Aug 31 2011  9:07AM   

 

 Contraceptive Management  Andreas  3 2012  9:43AM   

 

 Local Infection  Andreas  3 2012  9:43AM   

 

 Well Child Examination  2012  1:46PM   

 

 Right knee pain  2012  1:46PM   

 

 Right knee pain  2012  1:24PM   

 

 Abscess  2012  1:24PM   

 

 Right knee pain  Mar 26 2012 10:32AM   

 

 General Medical Exam, Adult  2012  1:46PM   

 

 Pregnancy test confirmed positive  2016 11:37AM   

 

 Obesity  2016 11:37AM   

 

 Normal pregnancy in multigravida in second trimester  Mar 28 2016  2:01PM   

 

 History of atrial septal defect repair  May 11 2016  8:50AM   

 

 Normal pregnancy in multigravida in second trimester  2016  2:00PM   

 

 Need for Tdap vaccine  2016  1:38PM   

 

 Group B Strep Screening,   Aug 23 2016  3:43PM   

 

 Normal pregnancy in multigravida in third trimester  Aug 23 2016  3:43PM   

 

 Small for gestational age fetus affecting management of mother in fre[place] 
pregnancy in third trimester  Sep  7 2016  4:50PM   

 

 Third trimester pregnancy  Sep  7 2016  3:20PM   

 

 Postoperative Examination Following Surgery  Sep 19 2016  9:26AM   







Payers







 Insurance Name  Company Name  Plan Name  Plan Number  Policy Number  Policy 
Group Number  Start Date

 

    [facility] - RHC - Community Plan [facility] RHC Comm  
   [id number]     N/A

 

    [facility] Community Plan [facility] Comm Plan of     
[id number]     2016

 

    [facility] Medical Assistance [facility]  [place] [employer]g     
[id number]     N/A

 

    zzzTest Medicare A  Test Medicare A     void     N/A







History of Encounters







 Visit Date  Visit Type  Provider

 

 10/24/2016  Office visit  Dr. Antonella Ventura MD

 

 2016  Office visit  Dr. Antonella Ventura MD

 

 2016  [facility]  Velma Mendieta APRN

 

 2016  [facility]  Dr. Antonella Ventura MD

 

 2016  Office visit  Megha Piedra DO

 

 2016  Office visit  Dr. Antonella Ventura MD

 

 2016  Office visit  Dr. Antonella Ventura MD

 

 2016  Office visit  Dr. Antonella Ventura MD

 

 2016  Office visit  Dr. Antonella Ventura MD

 

 2016  Office visit  Dr. Antonella Ventura MD

 

 2016  Office visit  Dr. Antonella Ventura MD

 

 3/28/2016  Office visit  Dr. Antonella Ventura MD

 

 2016  Office visit  Dr. Antonella Ventura MD

 

 2016  Office visit  Dr. Antonella Ventura MD

 

 2016  Office visit  Velma Mendieta APRN

 

 3/26/2012  Office visit  Ivanna Swan APRN

 

 2012  Office visit  Ivanna Swan APRN

 

 2012  Office visit  Ivanna Swan APRN

 

 1/3/2012  Office visit  Ivanna Swan APRN

 

 2011  Nurse visit  Ivanna Swan APRN

 

 2011  Nurse visit  Ivanna Swan APRN

 

 7/15/2011  Office visit  Ivanna Swan APRN

 

 2011  Office visit  Ivanna Swan APRN

 

 12/15/2010  Office visit  Willard Sorensen MD

 

 2010  Surgery  Willard Sorensen MD

 

 2010  Hospital  Willard Sorensen MD

 

 11/15/2010  Office visit  Willard Sorensen MD

 

 11/15/2010  [facility]  Willard Sorensen MD

 

 2010  Office visit  Willard Sorensen MD

 

 2010  Office visit  Willard Sorensen MD

 

 10/28/2010  Office visit  Willard Sorensen MD

 

 10/21/2010  Office visit  Willard Sorensen MD

 

 10/14/2010  Office visit  Willard Sorensen MD

 

 10/9/2010  [facility]  Willard Sorensen MD

 

 10/7/2010  Office visit  Willard Soernsen MD

 

 2010  Office visit  Willard Sorensen MD

 

 2010  Office visit  Willard Sorensen MD

 

 2010  Office visit  Willard Sorensen MD

 

 2010  Office visit  Willard Sorensen MD

 

 2010  Office visit  Willard Sorensen MD

 

 6/10/2010  Office visit  Willard Sorensen MD

 

 2010  Office visit  Willard Sorensen MD

 

 2010  Office visit  Willard Sorensen MD

 

 4/15/2010  Office visit  Willard Sorensen MD

## 2018-11-29 NOTE — XMS REPORT
MU2 Ambulatory Summary

 Created on: 2016



Radha Aviles

External Reference #: [id number]

: 1990

Sex: Female



Demographics







 Address  [address]

[place]  [address]

 

 Home Phone  [phone]

 

 Preferred Language  English

 

 Marital Status  Never 

 

 [employer] Affiliation  Unknown

 

 Race  White

 

 Ethnic Group  Not  or 





Author







 Author  Antonella Ventura

 

 [facility]

 

 Address  [address]

[place], [place]  [id number]



 

 Phone  [phone]







Care Team Providers







 Care Team Member Name  Role  Phone

 

 Antonella Ventura  PCP  Unavailable







Allergies and Adverse Reactions







 Name  Reaction  Notes

 

 NO KNOWN DRUG ALLERGIES      







Plan of Treatment

Not available.



Medications







  

 

 Name  Start Date  Expiration Date  SIG  Comments

 

 [place] 60 mg iron-1 mg oral tablet  4/15/2010  3/11/2011  take 1 tablet by 
oral route once daily for 30 days   

 

 Diflucan 150 mg oral tablet  2010  take 1 tablet (150 mg) by 
oral route once  for 1 days   

 

 Premarin 1.25 mg oral tablet  2011  take 1 tablet by oral route 
daily for 10 days   

 

 doxycycline hyclate 100 mg oral tablet  7/15/2011  2011  take 1 tablet (
100 mg) by oral route every 12 hours for 10 days   

 

 ibuprofen 800 mg oral tablet  2012  take 1 tablet by oral route 
3 times a day for 30 days   

 

 Bactrim -160 mg oral tablet  2012  take 1 tablet by oral 
route every 12 hours for 7 days   

 

 Macrobid 100 mg oral capsule  2016  take 1 capsule (100 mg) by 
oral route 2 times per day with food for 7 days   









 Discontinued 

 

 Name  Start Date  Discontinued Date  SIG  Comments

 

 atenolol 25 mg oral tablet     2011  take 1/2 tablet by oral route QD   

 

 Medrol (Antwan) 4 mg oral tablets,dose pack  2012  3/26/2012  take as 
directed   







Problem List







 Description  Status  Onset

 

 Prenatal Care, High Risk Pregnancy  Active  2010







Vital Signs







 Date  Time  BP-Sys(mm[Hg]  BP-Marisa(mm[Hg])  HR(bpm)  RR(rpm)  Temp  WT  HT  HC  
BMI  BSA  BMI Percentile  O2 Sat(%)

 

 2016  11:15:00 AM  130 mmHg  70 mmHg  80 bpm  20 rpm     214 lbs  64 in  
   36.73 kg/m2  2.09 m2      

 

 3/26/2012  10:30:00 AM  126 mmHg  62 mmHg  106 bpm  18 rpm  96.8 F  205 lbs  
64 in     35.1878 kg/m  2.0491 m     97 %

 

 2012  1:22:00 PM  120 mmHg  68 mmHg  66 bpm  18 rpm  97 F  202.375 lbs  
64 in     34.74 kg/m2  2.04 m2      

 

 2012  1:44:00 PM  122 mmHg  64 mmHg  68 bpm  18 rpm  98.2 F  198.125 lbs  
64 in     34.0077 kg/m  2.0145 m      

 

 1/3/2012  9:40:00 AM  103 mmHg  77 mmHg  64 bpm  18 rpm  97.7 F  197.125 lbs  
64 in     33.84 kg/m2  2.01 m2      

 

 2011  9:06:00 AM  118 mmHg  68 mmHg  74 bpm     97.4 F  189 lbs          
        

 

 7/15/2011  10:06:00 AM  126 mmHg  70 mmHg  73 bpm  20 rpm  97.9 F  180.5 lbs  
64 in     30.98 kg/m2  1.92 m2  0 %  99 %

 

 2011  8:56:00 AM  96 mmHg  68 mmHg  66 bpm  20 rpm  97.5 F  177.5 lbs  64 
in     30.4675 kg/m  1.9067 m  0 %  98 %

 

 12/15/2010  10:43:00 AM  104 mmHg  64 mmHg  48 bpm     97.8 F  150 lbs        
          

 

 2010  9:15:00 AM  112 mmHg  72 mmHg  49 bpm     98 F  149 lbs  64 in     
25.5755 kg/m  1.747 m  81.5 %   

 

 2010  8:54:00 AM  117 mmHg  65 mmHg  73 bpm  20 rpm  97.8 F  160.5 lbs   
               

 

 2010  9:42:00 AM  134 mmHg  66 mmHg  66 bpm        161 lbs               
   

 

 4/15/2010  4:02:00 PM  135 mmHg  71 mmHg  77 bpm        162 lbs  64 in     
27.81 kg/m2  1.82 m2  89.9 %   







Social History







 Name  Description  Comments

 

 History of tobacco usage     Quit smoking in January - was smoking 1-2cig/day

 

 denies alcohol use      

 

 Tobacco  Former smoker   







History of Procedures







 Date Ordered  Description  Order Status

 

 2011 12:00 AM  URINE PREGNANCY TEST  Reviewed

 

 2011 12:00 AM  IMMUNIZATION ADMIN  Reviewed

 

 2011 12:00 AM  HPV VACCINE 4 VALENT IM  Reviewed

 

 2016 12:00 AM  CYTOPATH C/V THIN LAYER  Returned

 

 2016 12:00 AM  SPECIMEN HANDLING OFFICE-LAB  Reviewed

 

 2016 12:00 AM  N.GONORRHOEAE DNA AMP PROB  Returned

 

 2016 12:00 AM  CHLAMYDIA CULTURE  Returned

 

 2016 12:00 AM  HIV-1ANTIBODY  Returned

 

 2016 12:00 AM  URINALYSIS AUTO W/SCOPE  Returned

 

 2016 12:00 AM  OBSTETRIC PANEL  Returned

 

 2016 12:00 AM  GLUCOSE TOLERANCE TEST (GTT)  Returned

 

 3/28/2016 12:00 AM  ALPHA-FETOPROTEIN SERUM  Returned

 

 3/28/2016 12:00 AM  CHORIONIC GONADOTROPIN TEST  Returned

 

 3/28/2016 12:00 AM  CHORIONIC GONADOTROPIN ASSAY  Returned

 

 5/3/2016 12:00 AM  OB US >/=14 WKS SNGL FETUS  Returned

 

 2012 12:00 AM  X-RAY EXAM KNEE 4 OR MORE  Returned

 

 4/15/2010 12:00 AM  OBSTETRIC PANEL  Reviewed

 

 4/15/2010 12:00 AM  HIV-1ANTIBODY  Reviewed

 

 4/15/2010 12:00 AM  URINALYSIS NONAUTO W/SCOPE  Reviewed

 

 4/15/2010 12:00 AM  OB US < 14 WKS SINGLE FETUS  Reviewed

 

 2010 12:00 AM  GLUCOSE TEST  Reviewed

 

 2010 12:00 AM  Rhogam  Reviewed

 

 2010 12:00 AM  Type and screen  Reviewed

 

 2010 12:00 AM  COMPLETE CBC W/AUTO DIFF WBC  Reviewed

 

 2010 12:00 AM  COMPLETE CBC W/AUTO DIFF WBC  Reviewed

 

 10/14/2010 12:00 AM  CULTURE OTHR SPECIMN AEROBIC  Reviewed

 

 12/15/2010 12:00 AM  INSERTION IMPLANON, IMPLANTABLE CONTRACEPTIVE CAPSULES  
Reviewed

 

 12/15/2010 12:00 AM  Etonogestrel (contraceptive) implant system, (Implanon)  
Reviewed

 

 7/15/2011 12:00 AM  CYTOPATH TBS C/V MANUAL  Reviewed

 

 7/15/2011 12:00 AM  CHLAMYDIA CULTURE  Reviewed

 

 7/15/2011 12:00 AM  N.GONORRHOEAE DNA AMP PROB  Reviewed

 

 7/15/2011 12:00 AM  URINE PREGNANCY TEST  Reviewed

 

 2011 12:00 AM  IMMUNIZATION ADMIN  Reviewed

 

 2011 12:00 AM  HPV VACCINE 4 VALENT IM  Reviewed







Results Summary







 Data and Description  Results

 

 2010 10:41 AM  RUBELLA 34.0 IU/mLCOLOR YELLOW APPEARANCE HAZY SPEC GRAV 
1.025 pH 7.0 PROTEIN TRACE GLUCOSE NEGATIVE KETONE 15 BILIRUBIN NEGATIVE BLOOD 
NEGATIVE NITRITE NEGATIVE LEUK SCREEN NEGATIVE CASTS/LPF NEGATIVE CRYSTALS 
TRACE AMORPH MUCOUS THRDS FEW BACTERIA NEGATIVE EPITH CELLS FEW SQUAMOUS 
TRICHOMONAS NEGATIVE YEAST NEGATIVE WBC 6.3 RBC 4.57 HGB 13.90 g/dLHCT 40.30 %
MCV 88.0 fLMCH 30.40 pgMCHC 34.50 g/dLMPV 9.20 fLPLT 258 %NEUT 65.10 %%LYMP 
28.30 %#NEUT 4.10 #LYMP 1.80 

 

 2010 3:48 PM  WBC 12.9 RBC 4.02 HGB 12.60 g/dLHCT 37.40 %MCV 93.0 fLMCH 
31.30 pgMCHC 33.70 g/dLRDW CV 14.10 %MPV 9.50 fLPLT 263 %NEUT 69.20 %%LYMP 
22.30 %%MONO 7.60 %%EOS 0.70 %%BASO 0.20 %#NEUT 8.91 #LYMP 2.88 #MONO 0.98 #EOS 
0.09 #BASO 0.03 

 

 10/22/2010 8:21 PM  COLOR YELLOW APPEARANCE CLEAR SPEC GRAV 1.010 pH 6.5 
PROTEIN NEGATIVE GLUCOSE NEGATIVE KETONE 15 BILIRUBIN NEGATIVE BLOOD NEGATIVE 
NITRITE NEGATIVE LEUK SCREEN SMALL CASTS/LPF NEGATIVE CRYSTALS NEGATIVE MUCOUS 
THRDS NEGATIVE BACTERIA 2++ EPITH CELLS 3+++ SQUAMOUS TRICHOMONAS NEGATIVE 
YEAST NEGATIVE 

 

 2010 11:58 PM  COLOR YELLOW APPEARANCE CLEAR SPEC GRAV 1.015 pH 6.5 
PROTEIN NEGATIVE GLUCOSE NEGATIVE KETONE NEGATIVE BILIRUBIN NEGATIVE BLOOD 
NEGATIVE NITRITE NEGATIVE LEUK SCREEN LARGE CASTS/LPF NEGATIVE CRYSTALS 
NEGATIVE MUCOUS THRDS NEGATIVE BACTERIA 2++ EPITH CELLS 2++ SQUAMOUS 
TRICHOMONAS NEGATIVE YEAST NEGATIVE 

 

 11/15/2010 5:30 PM  .0 IU/LURIC ACID 3.7 mg/dLGLUCOSE 66.0 mg/dLSODIUM 
137.0 mmol/LPOTASSIUM 3.10 mmol/LCHLORIDE 105.0 mmol/LCO2 21.0 mmol/LBUN 3.0 mg/
dLCREATININE 0.50 mg/dLSGOT/AST 20.0 IU/LSGPT/ALT 18.0 IU/LALK PHOS 152.0 IU/
LTOTAL PROTEIN 6.20 g/dLALBUMIN 3.40 g/dLTOTAL BILI 0.60 mg/dLCALCIUM 9.10 mg/
dLeGFR >60 mL/min/1.73 m2WBC 16.9 RBC 4.18 HGB 13.30 g/dLHCT 39.70 %MCV 95.0 
fLMCH 31.80 pgMCHC 33.50 g/dLRDW CV 14.0 %MPV 10.10 fLPLT 268 %NEUT 75.70 %%
LYMP 17.50 %%MONO 6.20 %%EOS 0.40 %%BASO 0.20 %#NEUT 12.82 #LYMP 2.97 #MONO 
1.05 #EOS 0.06 #BASO 0.03 

 

 11/15/2010 8:50 PM  PROTEIN UR 14.0 mg/dL

 

 2016 12:21 PM  Pap Smear Collected 

 

 2016 2:00 PM  WBC 9.8 RBC 4.60 HGB 13.60 g/dLHCT 41.40 %MCV 90.0 fLMCH 
29.60 pgMCHC 32.90 g/dLRDW CV 13.80 %MPV 9.70 fLPLT 271 %NEUT 59.80 %%LYMP 33.0 
%%MONO 6.40 %%EOS 0.60 %%BASO 0.20 %#NEUT 5.83 #LYMP 3.22 #MONO 0.62 #EOS 0.06 #
BASO 0.02 HIV AG/AB COMBO 0.14 RPR Non Reactive Rubella Antibodies, IgG 2.88 
Index

 

 2016 2:08 PM  COLOR YELLOW APPEARANCE CLEAR SPEC GRAV <=1.005 pH 6.0 
PROTEIN NEGATIVE GLUCOSE NEGATIVE mg/dLKETONE NEGATIVE BILIRUBIN NEGATIVE BLOOD 
NEGATIVE NITRITE NEGATIVE LEUK SCREEN TRACE CASTS/LPF NEGATIVE /LPFCRYSTALS 
NEGATIVE MUCOUS THRDS NEGATIVE BACTERIA NEGATIVE EPITH CELLS 3+++ SQUAMOUS /
HPFTRICHOMONAS NEGATIVE YEAST NEGATIVE 

 

 3/28/2016 2:18 PM  AFP Value 0.0240 ug/mLAFP MoM 1.02 hCG Value 01642.0 mIU/
mLhCG MoM 1.77 uE3 Value 0.70 ng/mLuE3 MoM 1.16 MARISA Value 207.520 pg/mLDIA MoM 
1.25 OSBR Risk       1 [place] [address] DSR (Second Trimester) 1IN 4435 DSR (By Age)    
1 IN 1003 T18 Risk Not increased T18 (By Age) 1:3907 







History Of Immunizations







 Name  Date Admin  Mfg Name  Mfg Code  Trade Name  Lot#  Route  Inj  Vis Given  
Vis Pub  CVX

 

 HPV  2011  [employer]  MSD  GARDASIL  [id number]  Intramuscular  Left 
Deltoid  2011  999

 

 HPV  2011  [employer]  MSD  GARDASIL  03864  Intramuscular  Left 
Deltoid  2011  62







History of Past Illness







 Name  Date of Onset  Comments

 

 Atrial Septal Defect, Ostium Secundum Type      

 

 Prenatal Care, High Risk Pregnancy  2010   

 

    Apr 15 2010  4:29PM   

 

 Prenatal Care, High Risk Pregnancy  Apr 15 2010  4:13PM   

 

    2010  9:54AM   

 

 Prenatal Care, High Risk Pregnancy  2010 10:10AM   

 

    May 13 2010  9:01AM   

 

    John 10 2010  2:45PM   

 

    2010  4:16PM   

 

 Pregnancy, First Normal  Aug 30 2010  9:14AM   

 

 Pregnancy, High Risk  Sep 13 2010  3:13PM   

 

 Group B Strep Screening,   Oct 14 2010  5:43PM   

 

 Pregnancy, High Risk  Oct 14 2010  5:43PM   

 

 Postoperative Examination Following Surgery  Dec  6 2010  9:15AM   

 

 IMPLANON  Insertion  Dec 15 2010 10:44AM   

 

 General Medical Exam, Child  2011  8:58AM   

 

 Contraceptive Management  2011  8:58AM   

 

 Routine gynecological examination  Jul 15 2011 10:03AM   

 

 Contraceptive Counseling  Jul 15 2011 10:03AM   

 

 Vaginal Discharge  Jul 15 2011 10:03AM   

 

 Irregular Menses  Jul 15 2011 10:03AM   

 

 G[place]il (HPV)  2011  8:54AM   

 

 Irregular Menses  Aug 31 2011  9:07AM   

 

 Family Planning  Aug 31 2011  9:07AM   

 

 Contraceptive Management  Andreas  3 2012  9:43AM   

 

 Local Infection  Andreas  3 2012  9:43AM   

 

 Well Child Examination  2012  1:46PM   

 

 Right knee pain  2012  1:46PM   

 

 Right knee pain  2012  1:24PM   

 

 Abscess  2012  1:24PM   

 

 Right knee pain  Mar 26 2012 10:32AM   

 

 General Medical Exam, Adult  2012  1:46PM   

 

 Pregnancy test confirmed positive  2016 11:37AM   

 

 Obesity  2016 11:37AM   

 

 Normal pregnancy in multigravida in second trimester  Mar 28 2016  2:01PM   

 

 History of atrial septal defect repair  May 11 2016  8:50AM   







Payers







 Insurance Name  Company Name  Plan Name  Plan Number  Policy Number  Policy 
Group Number  Start Date

 

    [facility]  
   [id number]     N/A

 

    [place] [employer]  [place] [employer]     
[id number]     N/A

 

    [id number] Medicare A  Test Medicare A     void     N/A







History of Encounters







 Visit Date  Visit Type  Provider

 

 2016  Office visit  Dr. Antonella Ventura MD

 

 3/28/2016  Office visit  Dr. Antonella Ventura MD

 

 2016  Office visit  Dr. Antonella Ventura MD

 

 2016  Office visit  Dr. Antonella Ventura MD

 

 2016  Office visit  Velma Mendieta APRN

 

 3/26/2012  Office visit  Ivanna Swan APRN

 

 2012  Office visit  Ivanna Swan APRN

 

 2012  Office visit  Ivanna Swan APRN

 

 1/3/2012  Office visit  Ivanna Swan APRN

 

 2011  Nurse visit  Ivanna Swan APRN

 

 2011  Nurse visit  Ivanna Swan APRN

 

 7/15/2011  Office visit  Ivanna Swan APRN

 

 2011  Office visit  Ivanna Swan APRN

 

 12/15/2010  Office visit  Willard Sorensen MD

 

 2010  Surgery  Willard Sorensen MD

 

 2010  [facility]  Willard Sorensen MD

 

 11/15/2010  Office visit  Willard Sorensen MD

 

 11/15/2010  [facility]  Willard Sorensen MD

 

 2010  Office visit  Willard Sorensen MD

 

 2010  Office visit  Willard Sorensen MD

 

 10/28/2010  Office visit  Willard Sorensen MD

 

 10/21/2010  Office visit  Willard Sorensen MD

 

 10/14/2010  Office visit  Willard Sorensen MD

 

 10/9/2010  [facility]  Willard Sorensen MD

 

 10/7/2010  Office visit  Willard Sorensen MD

 

 2010  Office visit  Willard Sorensen MD

 

 2010  Office visit  Willard Sorensen MD

 

 2010  Office visit  Willard Sorensen MD

 

 2010  Office visit  Willard Sorensen MD

 

 2010  Office visit  Willard Sorensen MD

 

 6/10/2010  Office visit  Willard Sorensen MD

 

 2010  Office visit  Willard Sorensen MD

 

 2010  Office visit  Willard Sorensen MD

 

 4/15/2010  Office visit  Willard Sorensen MD

## 2018-11-29 NOTE — XMS REPORT
MU2 Ambulatory Summary

 Created on: 2016



Radha Aviles

External Reference #: [id number]

: 1990

Sex: Female



Demographics







 Address  [address]

[place]  [address]

 

 Home Phone  [phone]

 

 Preferred Language  English

 

 Marital Status  Never 

 

 Syn[employer] Affiliation  Unknown

 

 Race  White

 

 Ethnic Group  Not  or 





Author







 Author  Antonella Ventura

 

 [facility]

 

 Address  [address]

[place], [place]  [id number]



 

 Phone  [phone]







Care Team Providers







 Care Team Member Name  Role  Phone

 

 Antonella Ventura  PCP  Unavailable







Allergies and Adverse Reactions







 Name  Reaction  Notes

 

 NO KNOWN DRUG ALLERGIES      







Plan of Treatment

Not available.



Medications







 Active 

 

 Name  Start Date  Estimated Completion Date  SIG  Comments

 

 Macrobid 100 mg oral capsule  2016  take 1 capsule (100 mg) by 
oral route 2 times per day with food for 7 days   









  

 

 Name  Start Date  Expiration Date  SIG  Comments

 

 [place] 60 mg iron-1 mg oral tablet  4/15/2010  3/11/2011  take 1 tablet by 
oral route once daily for 30 days   

 

 Diflucan 150 mg oral tablet  2010  take 1 tablet (150 mg) by 
oral route once  for 1 days   

 

 Premarin 1.25 mg oral tablet  2011  take 1 tablet by oral route 
daily for 10 days   

 

 doxycycline hyclate 100 mg oral tablet  7/15/2011  2011  take 1 tablet (
100 mg) by oral route every 12 hours for 10 days   

 

 ibuprofen 800 mg oral tablet  2012  take 1 tablet by oral route 
3 times a day for 30 days   

 

 Bactrim -160 mg oral tablet  2012  take 1 tablet by oral 
route every 12 hours for 7 days   









 Discontinued 

 

 Name  Start Date  Discontinued Date  SIG  Comments

 

 atenolol 25 mg oral tablet     2011  take 1/2 tablet by oral route QD   

 

 Medrol (Antwan) 4 mg oral tablets,dose pack  2012  3/26/2012  take as 
directed   







Problem List







 Description  Status  Onset

 

 Prenatal Care, High Risk Pregnancy  Active  2010







Vital Signs







 Date  Time  BP-Sys(mm[Hg]  BP-Marisa(mm[Hg])  HR(bpm)  RR(rpm)  Temp  WT  HT  HC  
BMI  BSA  BMI Percentile  O2 Sat(%)

 

 2016  11:15:00 AM  130 mmHg  70 mmHg  80 bpm  20 rpm     214 lbs  64 in  
   36.73 kg/m2  2.09 m2      

 

 3/26/2012  10:30:00 AM  126 mmHg  62 mmHg  106 bpm  18 rpm  96.8 F  205 lbs  
64 in     35.1878 kg/m  2.0491 m     97 %

 

 2012  1:22:00 PM  120 mmHg  68 mmHg  66 bpm  18 rpm  97 F  202.375 lbs  
64 in     34.74 kg/m2  2.04 m2      

 

 2012  1:44:00 PM  122 mmHg  64 mmHg  68 bpm  18 rpm  98.2 F  198.125 lbs  
64 in     34.0077 kg/m  2.0145 m      

 

 1/3/2012  9:40:00 AM  103 mmHg  77 mmHg  64 bpm  18 rpm  97.7 F  197.125 lbs  
64 in     33.84 kg/m2  2.01 m2      

 

 2011  9:06:00 AM  118 mmHg  68 mmHg  74 bpm     97.4 F  189 lbs          
        

 

 7/15/2011  10:06:00 AM  126 mmHg  70 mmHg  73 bpm  20 rpm  97.9 F  180.5 lbs  
64 in     30.98 kg/m2  1.92 m2  0 %  99 %

 

 2011  8:56:00 AM  96 mmHg  68 mmHg  66 bpm  20 rpm  97.5 F  177.5 lbs  64 
in     30.4675 kg/m  1.9067 m  0 %  98 %

 

 12/15/2010  10:43:00 AM  104 mmHg  64 mmHg  48 bpm     97.8 F  150 lbs        
          

 

 2010  9:15:00 AM  112 mmHg  72 mmHg  49 bpm     98 F  149 lbs  64 in     
25.5755 kg/m  1.747 m  81.5 %   

 

 2010  8:54:00 AM  117 mmHg  65 mmHg  73 bpm  20 rpm  97.8 F  160.5 lbs   
               

 

 2010  9:42:00 AM  134 mmHg  66 mmHg  66 bpm        161 lbs               
   

 

 4/15/2010  4:02:00 PM  135 mmHg  71 mmHg  77 bpm        162 lbs  64 in     
27.81 kg/m2  1.82 m2  89.9 %   







Social History







 Name  Description  Comments

 

 History of tobacco usage     Quit smoking in January - was smoking 1-2cig/day

 

 denies alcohol use      

 

 Tobacco  Former smoker   







History of Procedures







 Date Ordered  Description  Order Status

 

 2011 12:00 AM  URINE PREGNANCY TEST  Reviewed

 

 2011 12:00 AM  IMMUNIZATION ADMIN  Reviewed

 

 2011 12:00 AM  HPV VACCINE 4 VALENT IM  Reviewed

 

 2016 12:00 AM  CYTOPATH C/V THIN LAYER  Returned

 

 2016 12:00 AM  SPECIMEN HANDLING OFFICE-LAB  Reviewed

 

 2016 12:00 AM  N.GONORRHOEAE DNA AMP PROB  Returned

 

 2016 12:00 AM  CHLAMYDIA CULTURE  Returned

 

 2016 12:00 AM  HIV-1ANTIBODY  Returned

 

 2016 12:00 AM  URINALYSIS AUTO W/SCOPE  Returned

 

 2016 12:00 AM  OBSTETRIC PANEL  Returned

 

 2016 12:00 AM  GLUCOSE TOLERANCE TEST (GTT)  Returned

 

 2012 12:00 AM  X-RAY EXAM KNEE 4 OR MORE  Returned

 

 4/15/2010 12:00 AM  OBSTETRIC PANEL  Reviewed

 

 4/15/2010 12:00 AM  HIV-1ANTIBODY  Reviewed

 

 4/15/2010 12:00 AM  URINALYSIS NONAUTO W/SCOPE  Reviewed

 

 4/15/2010 12:00 AM  OB US < 14 WKS SINGLE FETUS  Reviewed

 

 2010 12:00 AM  GLUCOSE TEST  Reviewed

 

 2010 12:00 AM  Rhogam  Reviewed

 

 2010 12:00 AM  Type and screen  Reviewed

 

 2010 12:00 AM  COMPLETE CBC W/AUTO DIFF WBC  Reviewed

 

 2010 12:00 AM  COMPLETE CBC W/AUTO DIFF WBC  Reviewed

 

 10/14/2010 12:00 AM  CULTURE OTHR SPECIMN AEROBIC  Reviewed

 

 12/15/2010 12:00 AM  INSERTION IMPLANON, IMPLANTABLE CONTRACEPTIVE CAPSULES  
Reviewed

 

 12/15/2010 12:00 AM  Etonogestrel (contraceptive) implant system, (Implanon)  
Reviewed

 

 7/15/2011 12:00 AM  CYTOPATH TBS C/V MANUAL  Reviewed

 

 7/15/2011 12:00 AM  CHLAMYDIA CULTURE  Reviewed

 

 7/15/2011 12:00 AM  N.GONORRHOEAE DNA AMP PROB  Reviewed

 

 7/15/2011 12:00 AM  URINE PREGNANCY TEST  Reviewed

 

 2011 12:00 AM  IMMUNIZATION ADMIN  Reviewed

 

 2011 12:00 AM  HPV VACCINE 4 VALENT IM  Reviewed







Results Summary







 Data and Description  Results

 

 2010 10:41 AM  RUBELLA 34.0 IU/mLCOLOR YELLOW APPEARANCE HAZY SPEC GRAV 
1.025 pH 7.0 PROTEIN TRACE GLUCOSE NEGATIVE KETONE 15 BILIRUBIN NEGATIVE BLOOD 
NEGATIVE NITRITE NEGATIVE LEUK SCREEN NEGATIVE CASTS/LPF NEGATIVE CRYSTALS 
TRACE AMORPH MUCOUS THRDS FEW BACTERIA NEGATIVE EPITH CELLS FEW SQUAMOUS 
TRICHOMONAS NEGATIVE YEAST NEGATIVE WBC 6.3 RBC 4.57 HGB 13.90 g/dLHCT 40.30 %
MCV 88.0 fLMCH 30.40 pgMCHC 34.50 g/dLMPV 9.20 fLPLT 258 %NEUT 65.10 %%LYMP 
28.30 %#NEUT 4.10 #LYMP 1.80 

 

 2010 3:48 PM  WBC 12.9 RBC 4.02 HGB 12.60 g/dLHCT 37.40 %MCV 93.0 fLMCH 
31.30 pgMCHC 33.70 g/dLRDW CV 14.10 %MPV 9.50 fLPLT 263 %NEUT 69.20 %%LYMP 
22.30 %%MONO 7.60 %%EOS 0.70 %%BASO 0.20 %#NEUT 8.91 #LYMP 2.88 #MONO 0.98 #EOS 
0.09 #BASO 0.03 

 

 10/22/2010 8:21 PM  COLOR YELLOW APPEARANCE CLEAR SPEC GRAV 1.010 pH 6.5 
PROTEIN NEGATIVE GLUCOSE NEGATIVE KETONE 15 BILIRUBIN NEGATIVE BLOOD NEGATIVE 
NITRITE NEGATIVE LEUK SCREEN SMALL CASTS/LPF NEGATIVE CRYSTALS NEGATIVE MUCOUS 
THRDS NEGATIVE BACTERIA 2++ EPITH CELLS 3+++ SQUAMOUS TRICHOMONAS NEGATIVE 
YEAST NEGATIVE 

 

 2010 11:58 PM  COLOR YELLOW APPEARANCE CLEAR SPEC GRAV 1.015 pH 6.5 
PROTEIN NEGATIVE GLUCOSE NEGATIVE KETONE NEGATIVE BILIRUBIN NEGATIVE BLOOD 
NEGATIVE NITRITE NEGATIVE LEUK SCREEN LARGE CASTS/LPF NEGATIVE CRYSTALS 
NEGATIVE MUCOUS THRDS NEGATIVE BACTERIA 2++ EPITH CELLS 2++ SQUAMOUS 
TRICHOMONAS NEGATIVE YEAST NEGATIVE 

 

 11/15/2010 5:30 PM  .0 IU/LURIC ACID 3.7 mg/dLGLUCOSE 66.0 mg/dLSODIUM 
137.0 mmol/LPOTASSIUM 3.10 mmol/LCHLORIDE 105.0 mmol/LCO2 21.0 mmol/LBUN 3.0 mg/
dLCREATININE 0.50 mg/dLSGOT/AST 20.0 IU/LSGPT/ALT 18.0 IU/LALK PHOS 152.0 IU/
LTOTAL PROTEIN 6.20 g/dLALBUMIN 3.40 g/dLTOTAL BILI 0.60 mg/dLCALCIUM 9.10 mg/
dLeGFR >60 mL/min/1.73 m2WBC 16.9 RBC 4.18 HGB 13.30 g/dLHCT 39.70 %MCV 95.0 
fLMCH 31.80 pgMCHC 33.50 g/dLRDW CV 14.0 %MPV 10.10 fLPLT 268 %NEUT 75.70 %%
LYMP 17.50 %%MONO 6.20 %%EOS 0.40 %%BASO 0.20 %#NEUT 12.82 #LYMP 2.97 #MONO 
1.05 #EOS 0.06 #BASO 0.03 

 

 11/15/2010 8:50 PM  PROTEIN UR 14.0 mg/dL

 

 2016 12:21 PM  Pap Smear Collected 

 

 2016 2:00 PM  WBC 9.8 RBC 4.60 HGB 13.60 g/dLHCT 41.40 %MCV 90.0 fLMCH 
29.60 pgMCHC 32.90 g/dLRDW CV 13.80 %MPV 9.70 fLPLT 271 %NEUT 59.80 %%LYMP 33.0 
%%MONO 6.40 %%EOS 0.60 %%BASO 0.20 %#NEUT 5.83 #LYMP 3.22 #MONO 0.62 #EOS 0.06 #
BASO 0.02 HIV AG/AB COMBO 0.14 RPR Non Reactive Rubella Antibodies, IgG 2.88 
Index

 

 2016 2:08 PM  COLOR YELLOW APPEARANCE CLEAR SPEC GRAV <=1.005 pH 6.0 
PROTEIN NEGATIVE GLUCOSE NEGATIVE mg/dLKETONE NEGATIVE BILIRUBIN NEGATIVE BLOOD 
NEGATIVE NITRITE NEGATIVE LEUK SCREEN TRACE CASTS/LPF NEGATIVE /LPFCRYSTALS 
NEGATIVE MUCOUS THRDS NEGATIVE BACTERIA NEGATIVE EPITH CELLS 3+++ SQUAMOUS /
HPFTRICHOMONAS NEGATIVE YEAST NEGATIVE 







History Of Immunizations







 Name  Date Admin  Mfg Name  Mfg Code  Trade Name  Lot#  Route  Inj  Vis Given  
Vis Pub  CVX

 

 HPV  2011  [employer]  MSD  GARDASIL  [id number]  Intramuscular  Left 
Deltoid  2011  999

 

 HPV  2011  [employer]  MSD  GARDASIL  [id number]  Intramuscular  Left 
Deltoid  2011  62







History of Past Illness







 Name  Date of Onset  Comments

 

 Atrial Septal Defect, Ostium Secundum Type      

 

 Prenatal Care, High Risk Pregnancy  2010   

 

    Apr 15 2010  4:29PM   

 

 Prenatal Care, High Risk Pregnancy  Apr 15 2010  4:13PM   

 

    2010  9:54AM   

 

 Prenatal Care, High Risk Pregnancy  2010 10:10AM   

 

    May 13 2010  9:01AM   

 

    John 10 2010  2:45PM   

 

    2010  4:16PM   

 

 Pregnancy, First Normal  Aug 30 2010  9:14AM   

 

 Pregnancy, High Risk  Sep 13 2010  3:13PM   

 

 Group B Strep Screening,   Oct 14 2010  5:43PM   

 

 Pregnancy, High Risk  Oct 14 2010  5:43PM   

 

 Postoperative Examination Following Surgery  Dec  6 2010  9:15AM   

 

 IMPLANON  Insertion  Dec 15 2010 10:44AM   

 

 General Medical Exam, Child  2011  8:58AM   

 

 Contraceptive Management  2011  8:58AM   

 

 Routine gynecological examination  Jul 15 2011 10:03AM   

 

 Contraceptive Counseling  Jul 15 2011 10:03AM   

 

 Vaginal Discharge  Jul 15 2011 10:03AM   

 

 Irregular Menses  Jul 15 2011 10:03AM   

 

 [place]il (HPV)  2011  8:54AM   

 

 Irregular Menses  Aug 31 2011  9:07AM   

 

 Family Planning  Aug 31 2011  9:07AM   

 

 Contraceptive Management  Andreas  3 2012  9:43AM   

 

 Local Infection  Andreas  3 2012  9:43AM   

 

 Well Child Examination  2012  1:46PM   

 

 Right knee pain  2012  1:46PM   

 

 Right knee pain  2012  1:24PM   

 

 Abscess  2012  1:24PM   

 

 Right knee pain  Mar 26 2012 10:32AM   

 

 General Medical Exam, Adult  2012  1:46PM   

 

 Pregnancy test confirmed positive  2016 11:37AM   

 

 Obesity  2016 11:37AM   







Payers







 Insurance Name  Company Name  Plan Name  Plan Number  Policy Number  Policy 
Group Number  Start Date

 

    [employer]  [employer] [employer]     
[id number]     N/A

 

    [facility]     void     N/A







History of Encounters







 Visit Date  Visit Type  Provider

 

 2016  Office visit  Dr. Antonella Ventura MD

 

 2016  Office visit  Velma Mendieta APRN

 

 3/26/2012  Office visit  Ivanna Swan APRN

 

 2012  Office visit  Ivanna Swan APRN

 

 2012  Office visit  Ivanna Swan APRN

 

 1/3/2012  Office visit  Ivanna Swan APRN

 

 2011  Nurse visit  Ivanna Swan APRN

 

 2011  Nurse visit  Ivanna Swan APRN

 

 7/15/2011  Office visit  Ivanna Swan APRN

 

 2011  Office visit  Ivanna Swan APRN

 

 12/15/2010  Office visit  Willard Sorensen MD

 

 2010  Surgery  Willard Sorensen MD

 

 2010  Hospital  Willard Sorensen MD

 

 11/15/2010  Office visit  Willard Sorensen MD

 

 11/15/2010  [facility]  Willard Sorensen MD

 

 2010  Office visit  Willard Sorensen MD

 

 2010  Office visit  Willard Sorensen MD

 

 10/28/2010  Office visit  Willard Sorensen MD

 

 10/21/2010  Office visit  Willard Sorensen MD

 

 10/14/2010  Office visit  Willard Sorensen MD

 

 10/9/2010  [facility]  Willard Sorensen MD

 

 10/7/2010  Office visit  Willard Sorensen MD

 

 2010  Office visit  Willard Sorensen MD

 

 2010  Office visit  Willard Sorensen MD

 

 2010  Office visit  Willard Sorensen MD

 

 2010  Office visit  Willard Sorensen MD

 

 2010  Office visit  Willard Sorensen MD

 

 6/10/2010  Office visit  Willard Sorensen MD

 

 2010  Office visit  Willard Sorensen MD

 

 2010  Office visit  Willard Sorensen MD

 

 4/15/2010  Office visit  Willard Sorensen MD

## 2018-11-29 NOTE — XMS REPORT
MU2 Ambulatory Summary

 Created on: 2016



Radha Aviles

External Reference #: [id number]

: 1990

Sex: Female



Demographics







 Address  [address] [place] [address] [address]

[place]  [address]

 

 Home Phone  [phone]

 

 Preferred Language  English

 

 Marital Status  Never 

 

 Shinto Affiliation  Unknown

 

 Race  White

 

 Ethnic Group  Not  or 





Author







 Author  Antonella Ventura  [facility]

 

 Address  [address]

[place], [place]  [id number]



 

 Phone  [phone]







Care Team Providers







 Care Team Member Name  Role  Phone

 

 Antonella Ventura  PCP  Unavailable







Allergies and Adverse Reactions







 Name  Reaction  Notes

 

 NO KNOWN DRUG ALLERGIES      







Plan of Treatment







 Planned Activity  Comments  Planned Date  Planned Time  Plan/Goal

 

 ALPHA-FETOPROTEIN SERUM     3/28/2016  12:00 AM   

 

 CHORIONIC GONADOTROPIN TEST     3/28/2016  12:00 AM   

 

 CHORIONIC GONADOTROPIN ASSAY     3/28/2016  12:00 AM   

 

 OB US >/=14 WKS SNGL FETUS     5/3/2016  12:00 AM   







Medications







  

 

 Name  Start Date  Expiration Date  SIG  Comments

 

 NataFort 60 mg iron-1 mg oral tablet  4/15/2010  3/11/2011  take 1 tablet by 
oral route once daily for 30 days   

 

 Diflucan 150 mg oral tablet  2010  take 1 tablet (150 mg) by 
oral route once  for 1 days   

 

 Premarin 1.25 mg oral tablet  2011  take 1 tablet by oral route 
daily for 10 days   

 

 doxycycline hyclate 100 mg oral tablet  7/15/2011  2011  take 1 tablet (
100 mg) by oral route every 12 hours for 10 days   

 

 ibuprofen 800 mg oral tablet  2012  take 1 tablet by oral route 
3 times a day for 30 days   

 

 Bactrim -160 mg oral tablet  2012  take 1 tablet by oral 
route every 12 hours for 7 days   

 

 Macrobid 100 mg oral capsule  2016  take 1 capsule (100 mg) by 
oral route 2 times per day with food for 7 days   









 Discontinued 

 

 Name  Start Date  Discontinued Date  SIG  Comments

 

 atenolol 25 mg oral tablet     2011  take 1/2 tablet by oral route QD   

 

 Medrol (Antwan) 4 mg oral tablets,dose pack  2012  3/26/2012  take as 
directed   







Problem List







 Description  Status  Onset

 

 Prenatal Care, High Risk Pregnancy  Active  2010







Vital Signs







 Date  Time  BP-Sys(mm[Hg]  BP-Marisa(mm[Hg])  HR(bpm)  RR(rpm)  Temp  WT  HT  HC  
BMI  BSA  BMI Percentile  O2 Sat(%)

 

 2016  11:15:00 AM  130 mmHg  70 mmHg  80 bpm  20 rpm     214 lbs  64 in  
   36.73 kg/m2  2.09 m2      

 

 3/26/2012  10:30:00 AM  126 mmHg  62 mmHg  106 bpm  18 rpm  96.8 F  205 lbs  
64 in     35.1878 kg/m  2.0491 m     97 %

 

 2012  1:22:00 PM  120 mmHg  68 mmHg  66 bpm  18 rpm  97 F  202.375 lbs  
64 in     34.74 kg/m2  2.04 m2      

 

 2012  1:44:00 PM  122 mmHg  64 mmHg  68 bpm  18 rpm  98.2 F  198.125 lbs  
64 in     34.0077 kg/m  2.0145 m      

 

 1/3/2012  9:40:00 AM  103 mmHg  77 mmHg  64 bpm  18 rpm  97.7 F  197.125 lbs  
64 in     33.84 kg/m2  2.01 m2      

 

 2011  9:06:00 AM  118 mmHg  68 mmHg  74 bpm     97.4 F  189 lbs          
        

 

 7/15/2011  10:06:00 AM  126 mmHg  70 mmHg  73 bpm  20 rpm  97.9 F  180.5 lbs  
64 in     30.98 kg/m2  1.92 m2  0 %  99 %

 

 2011  8:56:00 AM  96 mmHg  68 mmHg  66 bpm  20 rpm  97.5 F  177.5 lbs  64 
in     30.4675 kg/m  1.9067 m  0 %  98 %

 

 12/15/2010  10:43:00 AM  104 mmHg  64 mmHg  48 bpm     97.8 F  150 lbs        
          

 

 2010  9:15:00 AM  112 mmHg  72 mmHg  49 bpm     98 F  149 lbs  64 in     
25.5755 kg/m  1.747 m  81.5 %   

 

 2010  8:54:00 AM  117 mmHg  65 mmHg  73 bpm  20 rpm  97.8 F  160.5 lbs   
               

 

 2010  9:42:00 AM  134 mmHg  66 mmHg  66 bpm        161 lbs               
   

 

 4/15/2010  4:02:00 PM  135 mmHg  71 mmHg  77 bpm        162 lbs  64 in     
27.81 kg/m2  1.82 m2  89.9 %   







Social History







 Name  Description  Comments

 

 History of tobacco usage     Quit smoking in January - was smoking 1-2cig/day

 

 denies alcohol use      

 

 Tobacco  Former smoker   







History of Procedures







 Date Ordered  Description  Order Status

 

 2011 12:00 AM  URINE PREGNANCY TEST  Reviewed

 

 2011 12:00 AM  IMMUNIZATION ADMIN  Reviewed

 

 2011 12:00 AM  HPV VACCINE 4 VALENT IM  Reviewed

 

 2016 12:00 AM  CYTOPATH C/V THIN LAYER  Returned

 

 2016 12:00 AM  SPECIMEN HANDLING OFFICE-LAB  Reviewed

 

 2016 12:00 AM  N.GONORRHOEAE DNA AMP PROB  Returned

 

 2016 12:00 AM  CHLAMYDIA CULTURE  Returned

 

 2016 12:00 AM  HIV-1ANTIBODY  Returned

 

 2016 12:00 AM  URINALYSIS AUTO W/SCOPE  Returned

 

 2016 12:00 AM  OBSTETRIC PANEL  Returned

 

 2016 12:00 AM  GLUCOSE TOLERANCE TEST (GTT)  Returned

 

 2012 12:00 AM  X-RAY EXAM KNEE 4 OR MORE  Returned

 

 4/15/2010 12:00 AM  OBSTETRIC PANEL  Reviewed

 

 4/15/2010 12:00 AM  HIV-1ANTIBODY  Reviewed

 

 4/15/2010 12:00 AM  URINALYSIS NONAUTO W/SCOPE  Reviewed

 

 4/15/2010 12:00 AM  OB US < 14 WKS SINGLE FETUS  Reviewed

 

 2010 12:00 AM  GLUCOSE TEST  Reviewed

 

 2010 12:00 AM  Rhogam  Reviewed

 

 2010 12:00 AM  Type and screen  Reviewed

 

 2010 12:00 AM  COMPLETE CBC W/AUTO DIFF WBC  Reviewed

 

 2010 12:00 AM  COMPLETE CBC W/AUTO DIFF WBC  Reviewed

 

 10/14/2010 12:00 AM  CULTURE OTHR SPECIMN AEROBIC  Reviewed

 

 12/15/2010 12:00 AM  INSERTION IMPLANON, IMPLANTABLE CONTRACEPTIVE CAPSULES  
Reviewed

 

 12/15/2010 12:00 AM  Etonogestrel (contraceptive) implant system, (Implanon)  
Reviewed

 

 7/15/2011 12:00 AM  CYTOPATH TBS C/V MANUAL  Reviewed

 

 7/15/2011 12:00 AM  CHLAMYDIA CULTURE  Reviewed

 

 7/15/2011 12:00 AM  N.GONORRHOEAE DNA AMP PROB  Reviewed

 

 7/15/2011 12:00 AM  URINE PREGNANCY TEST  Reviewed

 

 2011 12:00 AM  IMMUNIZATION ADMIN  Reviewed

 

 2011 12:00 AM  HPV VACCINE 4 VALENT IM  Reviewed







Results Summary







 Data and Description  Results

 

 2010 10:41 AM  RUBELLA 34.0 IU/mLCOLOR YELLOW APPEARANCE HAZY SPEC GRAV 
1.025 pH 7.0 PROTEIN TRACE GLUCOSE NEGATIVE KETONE 15 BILIRUBIN NEGATIVE BLOOD 
NEGATIVE NITRITE NEGATIVE LEUK SCREEN NEGATIVE CASTS/LPF NEGATIVE CRYSTALS 
TRACE AMORPH MUCOUS THRDS FEW BACTERIA NEGATIVE EPITH CELLS FEW SQUAMOUS 
TRICHOMONAS NEGATIVE YEAST NEGATIVE WBC 6.3 RBC 4.57 HGB 13.90 g/dLHCT 40.30 %
MCV 88.0 fLMCH 30.40 pgMCHC 34.50 g/dLMPV 9.20 fLPLT 258 %NEUT 65.10 %%LYMP 
28.30 %#NEUT 4.10 #LYMP 1.80 

 

 2010 3:48 PM  WBC 12.9 RBC 4.02 HGB 12.60 g/dLHCT 37.40 %MCV 93.0 fLMCH 
31.30 pgMCHC 33.70 g/dLRDW CV 14.10 %MPV 9.50 fLPLT 263 %NEUT 69.20 %%LYMP 
22.30 %%MONO 7.60 %%EOS 0.70 %%BASO 0.20 %#NEUT 8.91 #LYMP 2.88 #MONO 0.98 #EOS 
0.09 #BASO 0.03 

 

 10/22/2010 8:21 PM  COLOR YELLOW APPEARANCE CLEAR SPEC GRAV 1.010 pH 6.5 
PROTEIN NEGATIVE GLUCOSE NEGATIVE KETONE 15 BILIRUBIN NEGATIVE BLOOD NEGATIVE 
NITRITE NEGATIVE LEUK SCREEN SMALL CASTS/LPF NEGATIVE CRYSTALS NEGATIVE MUCOUS 
THRDS NEGATIVE BACTERIA 2++ EPITH CELLS 3+++ SQUAMOUS TRICHOMONAS NEGATIVE 
YEAST NEGATIVE 

 

 2010 11:58 PM  COLOR YELLOW APPEARANCE CLEAR SPEC GRAV 1.015 pH 6.5 
PROTEIN NEGATIVE GLUCOSE NEGATIVE KETONE NEGATIVE BILIRUBIN NEGATIVE BLOOD 
NEGATIVE NITRITE NEGATIVE LEUK SCREEN LARGE CASTS/LPF NEGATIVE CRYSTALS 
NEGATIVE MUCOUS THRDS NEGATIVE BACTERIA 2++ EPITH CELLS 2++ SQUAMOUS 
TRICHOMONAS NEGATIVE YEAST NEGATIVE 

 

 11/15/2010 5:30 PM  .0 IU/LURIC ACID 3.7 mg/dLGLUCOSE 66.0 mg/dLSODIUM 
137.0 mmol/LPOTASSIUM 3.10 mmol/LCHLORIDE 105.0 mmol/LCO2 21.0 mmol/LBUN 3.0 mg/
dLCREATININE 0.50 mg/dLSGOT/AST 20.0 IU/LSGPT/ALT 18.0 IU/LALK PHOS 152.0 IU/
LTOTAL PROTEIN 6.20 g/dLALBUMIN 3.40 g/dLTOTAL BILI 0.60 mg/dLCALCIUM 9.10 mg/
dLeGFR >60 mL/min/1.73 m2WBC 16.9 RBC 4.18 HGB 13.30 g/dLHCT 39.70 %MCV 95.0 
fLMCH 31.80 pgMCHC 33.50 g/dLRDW CV 14.0 %MPV 10.10 fLPLT 268 %NEUT 75.70 %%
LYMP 17.50 %%MONO 6.20 %%EOS 0.40 %%BASO 0.20 %#NEUT 12.82 #LYMP 2.97 #MONO 
1.05 #EOS 0.06 #BASO 0.03 

 

 11/15/2010 8:50 PM  PROTEIN UR 14.0 mg/dL

 

 2016 12:21 PM  Pap Smear Collected 

 

 2016 2:00 PM  WBC 9.8 RBC 4.60 HGB 13.60 g/dLHCT 41.40 %MCV 90.0 fLMCH 
29.60 pgMCHC 32.90 g/dLRDW CV 13.80 %MPV 9.70 fLPLT 271 %NEUT 59.80 %%LYMP 33.0 
%%MONO 6.40 %%EOS 0.60 %%BASO 0.20 %#NEUT 5.83 #LYMP 3.22 #MONO 0.62 #EOS 0.06 #
BASO 0.02 HIV AG/AB COMBO 0.14 RPR Non Reactive Rubella Antibodies, IgG 2.88 
Index

 

 2016 2:08 PM  COLOR YELLOW APPEARANCE CLEAR SPEC GRAV <=1.005 pH 6.0 
PROTEIN NEGATIVE GLUCOSE NEGATIVE mg/dLKETONE NEGATIVE BILIRUBIN NEGATIVE BLOOD 
NEGATIVE NITRITE NEGATIVE LEUK SCREEN TRACE CASTS/LPF NEGATIVE /LPFCRYSTALS 
NEGATIVE MUCOUS THRDS NEGATIVE BACTERIA NEGATIVE EPITH CELLS 3+++ SQUAMOUS /
HPFTRICHOMONAS NEGATIVE YEAST NEGATIVE 







History Of Immunizations







 Name  Date Admin  Mfg Name  Mfg Code  Trade Name  Lot#  Route  Inj  Vis Given  
Vis Pub  CVX

 

 HPV  2011  [employer]  MSD  GARDASIL  [id number]  Intramuscular  Left 
Deltoid  2011  999

 

 HPV  2011  [employer]  MSD  GARDASIL  32315  Intramuscular  Left 
Deltoid  2011  62







History of Past Illness







 Name  Date of Onset  Comments

 

 Atrial Septal Defect, Ostium Secundum Type      

 

 Prenatal Care, High Risk Pregnancy  2010   

 

    Apr 15 2010  4:29PM   

 

 Prenatal Care, High Risk Pregnancy  Apr 15 2010  4:13PM   

 

    2010  9:54AM   

 

 Prenatal Care, High Risk Pregnancy  2010 10:10AM   

 

    May 13 2010  9:01AM   

 

    John 10 2010  2:45PM   

 

    2010  4:16PM   

 

 Pregnancy, First Normal  Aug 30 2010  9:14AM   

 

 Pregnancy, High Risk  Sep 13 2010  3:13PM   

 

 Group B Strep Screening,   Oct 14 2010  5:43PM   

 

 Pregnancy, High Risk  Oct 14 2010  5:43PM   

 

 Postoperative Examination Following Surgery  Dec  6 2010  9:15AM   

 

 IMPLANON  Insertion  Dec 15 2010 10:44AM   

 

 General Medical Exam, Child  2011  8:58AM   

 

 Contraceptive Management  2011  8:58AM   

 

 Routine gynecological examination  Jul 15 2011 10:03AM   

 

 Contraceptive Counseling  Jul 15 2011 10:03AM   

 

 Vaginal Discharge  Jul 15 2011 10:03AM   

 

 Irregular Menses  Jul 15 2011 10:03AM   

 

 Gardsil (HPV)  2011  8:54AM   

 

 Irregular Menses  Aug 31 2011  9:07AM   

 

 Family Planning  Aug 31 2011  9:07AM   

 

 Contraceptive Management  Andreas  3 2012  9:43AM   

 

 Local Infection  Andreas  3 2012  9:43AM   

 

 Well Child Examination  2012  1:46PM   

 

 Right knee pain  2012  1:46PM   

 

 Right knee pain  2012  1:24PM   

 

 Abscess  2012  1:24PM   

 

 Right knee pain  Mar 26 2012 10:32AM   

 

 General Medical Exam, Adult  2012  1:46PM   

 

 Pregnancy test confirmed positive  2016 11:37AM   

 

 Obesity  2016 11:37AM   

 

 Normal pregnancy in multigravida in second trimester  Mar 28 2016  2:01PM   







Payers







 Insurance Name  Company Name  Plan Name  Plan Number  Policy Number  Policy 
Group Number  Start Date

 

    [facility]  
   [id number]     N/A

 

    [employer] [employer]     
[id number]     N/A

 

    [id number] Medicare A  Test Medicare A     void     N/A







History of Encounters







 Visit Date  Visit Type  Provider

 

 3/28/2016  Office visit  Dr. Antonella Ventura MD

 

 2016  Office visit  Dr. Antonella Ventura MD

 

 2016  Office visit  Dr. Antonella Ventura MD

 

 2016  Office visit  Velma Mendieta APRN

 

 3/26/2012  Office visit  Ivanna Swan APRN

 

 2012  Office visit  Ivanna Swan APRN

 

 2012  Office visit  Ivanna Swan APRN

 

 1/3/2012  Office visit  Ivanna Swan APRN

 

 2011  Nurse visit  Ivanna Swan APRN

 

 2011  Nurse visit  Ivanna Swan APRN

 

 7/15/2011  Office visit  Ivanna Swan APRN

 

 2011  Office visit  Ivanna Swan APRN

 

 12/15/2010  Office visit  Willard Sorensen MD

 

 2010  Surgery  Willard Sorensen MD

 

 2010  [facility]  Willard Sorensen MD

 

 11/15/2010  Office visit  Willard Sorensen MD

 

 11/15/2010  [facility]  Willard Sorensen MD

 

 2010  Office visit  Willard Sorensen MD

 

 2010  Office visit  Willard Sorensen MD

 

 10/28/2010  Office visit  Willard Sorensen MD

 

 10/21/2010  Office visit  Willard Sorensen MD

 

 10/14/2010  Office visit  Willard Sorensen MD

 

 10/9/2010  [facility]  Willard Sorensen MD

 

 10/7/2010  Office visit  Willard Sorensen MD

 

 2010  Office visit  Willard Sorensen MD

 

 2010  Office visit  Willard Sorensen MD

 

 2010  Office visit  Willard Sorensen MD

 

 2010  Office visit  Willard Sorensen MD

 

 2010  Office visit  Willard Sorensen MD

 

 6/10/2010  Office visit  Willard Sorensen MD

 

 2010  Office visit  Willard Sorensen MD

 

 2010  Office visit  Willard Sorensen MD

 

 4/15/2010  Office visit  Willard Sorensen MD

## 2018-11-29 NOTE — XMS REPORT
MU2 Ambulatory Summary

 Created on: 2016



Radha Aviles

External Reference #: [id number]

: 1990

Sex: Female



Demographics







 Address  [address] [place] [address] [address]

[place]  [address]

 

 Home Phone  [phone]

 

 Preferred Language  English

 

 Marital Status  Never 

 

 Adventism Affiliation  Unknown

 

 Race  White

 

 Ethnic Group  Not  or 





Author







 Author  Antonella Ventura  [facility]

 

 Address  [address]

[place], [place]  [id number]



 

 Phone  [phone]







Care Team Providers







 Care Team Member Name  Role  Phone

 

 Antonella Ventura  PCP  Unavailable







Allergies and Adverse Reactions







 Name  Reaction  Notes

 

 NO KNOWN DRUG ALLERGIES      







Plan of Treatment







 Planned Activity  Comments  Planned Date  Planned Time  Plan/Goal

 

 OB US >/=14 WKS SNGL FETUS     5/3/2016  12:00 AM   







Medications







  

 

 Name  Start Date  Expiration Date  SIG  Comments

 

 [place] 60 mg iron-1 mg oral tablet  4/15/2010  3/11/2011  take 1 tablet by 
oral route once daily for 30 days   

 

 Diflucan 150 mg oral tablet  2010  take 1 tablet (150 mg) by 
oral route once  for 1 days   

 

 Premarin 1.25 mg oral tablet  2011  take 1 tablet by oral route 
daily for 10 days   

 

 doxycycline hyclate 100 mg oral tablet  7/15/2011  2011  take 1 tablet (
100 mg) by oral route every 12 hours for 10 days   

 

 ibuprofen 800 mg oral tablet  2012  take 1 tablet by oral route 
3 times a day for 30 days   

 

 Bactrim -160 mg oral tablet  2012  take 1 tablet by oral 
route every 12 hours for 7 days   

 

 Macrobid 100 mg oral capsule  2016  take 1 capsule (100 mg) by 
oral route 2 times per day with food for 7 days   









 Discontinued 

 

 Name  Start Date  Discontinued Date  SIG  Comments

 

 atenolol 25 mg oral tablet     2011  take 1/2 tablet by oral route QD   

 

 Medrol (Antwan) 4 mg oral tablets,dose pack  2012  3/26/2012  take as 
directed   







Problem List







 Description  Status  Onset

 

 Prenatal Care, High Risk Pregnancy  Active  2010







Vital Signs







 Date  Time  BP-Sys(mm[Hg]  BP-Marisa(mm[Hg])  HR(bpm)  RR(rpm)  Temp  WT  HT  HC  
BMI  BSA  BMI Percentile  O2 Sat(%)

 

 2016  11:15:00 AM  130 mmHg  70 mmHg  80 bpm  20 rpm     214 lbs  64 in  
   36.73 kg/m2  2.09 m2      

 

 3/26/2012  10:30:00 AM  126 mmHg  62 mmHg  106 bpm  18 rpm  96.8 F  205 lbs  
64 in     35.1878 kg/m  2.0491 m     97 %

 

 2012  1:22:00 PM  120 mmHg  68 mmHg  66 bpm  18 rpm  97 F  202.375 lbs  
64 in     34.74 kg/m2  2.04 m2      

 

 2012  1:44:00 PM  122 mmHg  64 mmHg  68 bpm  18 rpm  98.2 F  198.125 lbs  
64 in     34.0077 kg/m  2.0145 m      

 

 1/3/2012  9:40:00 AM  103 mmHg  77 mmHg  64 bpm  18 rpm  97.7 F  197.125 lbs  
64 in     33.84 kg/m2  2.01 m2      

 

 2011  9:06:00 AM  118 mmHg  68 mmHg  74 bpm     97.4 F  189 lbs          
        

 

 7/15/2011  10:06:00 AM  126 mmHg  70 mmHg  73 bpm  20 rpm  97.9 F  180.5 lbs  
64 in     30.98 kg/m2  1.92 m2  0 %  99 %

 

 2011  8:56:00 AM  96 mmHg  68 mmHg  66 bpm  20 rpm  97.5 F  177.5 lbs  64 
in     30.4675 kg/m  1.9067 m  0 %  98 %

 

 12/15/2010  10:43:00 AM  104 mmHg  64 mmHg  48 bpm     97.8 F  150 lbs        
          

 

 2010  9:15:00 AM  112 mmHg  72 mmHg  49 bpm     98 F  149 lbs  64 in     
25.5755 kg/m  1.747 m  81.5 %   

 

 2010  8:54:00 AM  117 mmHg  65 mmHg  73 bpm  20 rpm  97.8 F  160.5 lbs   
               

 

 2010  9:42:00 AM  134 mmHg  66 mmHg  66 bpm        161 lbs               
   

 

 4/15/2010  4:02:00 PM  135 mmHg  71 mmHg  77 bpm        162 lbs  64 in     
27.81 kg/m2  1.82 m2  89.9 %   







Social History







 Name  Description  Comments

 

 History of tobacco usage     Quit smoking in January - was smoking 1-2cig/day

 

 denies alcohol use      

 

 Tobacco  Former smoker   







History of Procedures







 Date Ordered  Description  Order Status

 

 2011 12:00 AM  URINE PREGNANCY TEST  Reviewed

 

 2011 12:00 AM  IMMUNIZATION ADMIN  Reviewed

 

 2011 12:00 AM  HPV VACCINE 4 VALENT IM  Reviewed

 

 2016 12:00 AM  CYTOPATH C/V THIN LAYER  Returned

 

 2016 12:00 AM  SPECIMEN HANDLING OFFICE-LAB  Reviewed

 

 2016 12:00 AM  N.GONORRHOEAE DNA AMP PROB  Returned

 

 2016 12:00 AM  CHLAMYDIA CULTURE  Returned

 

 2016 12:00 AM  HIV-1ANTIBODY  Returned

 

 2016 12:00 AM  URINALYSIS AUTO W/SCOPE  Returned

 

 2016 12:00 AM  OBSTETRIC PANEL  Returned

 

 2016 12:00 AM  GLUCOSE TOLERANCE TEST (GTT)  Returned

 

 3/28/2016 12:00 AM  ALPHA-FETOPROTEIN SERUM  Returned

 

 3/28/2016 12:00 AM  CHORIONIC GONADOTROPIN TEST  Returned

 

 3/28/2016 12:00 AM  CHORIONIC GONADOTROPIN ASSAY  Returned

 

 2012 12:00 AM  X-RAY EXAM KNEE 4 OR MORE  Returned

 

 4/15/2010 12:00 AM  OBSTETRIC PANEL  Reviewed

 

 4/15/2010 12:00 AM  HIV-1ANTIBODY  Reviewed

 

 4/15/2010 12:00 AM  URINALYSIS NONAUTO W/SCOPE  Reviewed

 

 4/15/2010 12:00 AM  OB US < 14 WKS SINGLE FETUS  Reviewed

 

 2010 12:00 AM  GLUCOSE TEST  Reviewed

 

 2010 12:00 AM  Rhogam  Reviewed

 

 2010 12:00 AM  Type and screen  Reviewed

 

 2010 12:00 AM  COMPLETE CBC W/AUTO DIFF WBC  Reviewed

 

 2010 12:00 AM  COMPLETE CBC W/AUTO DIFF WBC  Reviewed

 

 10/14/2010 12:00 AM  CULTURE OTHR SPECIMN AEROBIC  Reviewed

 

 12/15/2010 12:00 AM  INSERTION IMPLANON, IMPLANTABLE CONTRACEPTIVE CAPSULES  
Reviewed

 

 12/15/2010 12:00 AM  Etonogestrel (contraceptive) implant system, (Implanon)  
Reviewed

 

 7/15/2011 12:00 AM  CYTOPATH TBS C/V MANUAL  Reviewed

 

 7/15/2011 12:00 AM  CHLAMYDIA CULTURE  Reviewed

 

 7/15/2011 12:00 AM  N.GONORRHOEAE DNA AMP PROB  Reviewed

 

 7/15/2011 12:00 AM  URINE PREGNANCY TEST  Reviewed

 

 2011 12:00 AM  IMMUNIZATION ADMIN  Reviewed

 

 2011 12:00 AM  HPV VACCINE 4 VALENT IM  Reviewed







Results Summary







 Data and Description  Results

 

 2010 10:41 AM  RUBELLA 34.0 IU/mLCOLOR YELLOW APPEARANCE HAZY SPEC GRAV 
1.025 pH 7.0 PROTEIN TRACE GLUCOSE NEGATIVE KETONE 15 BILIRUBIN NEGATIVE BLOOD 
NEGATIVE NITRITE NEGATIVE LEUK SCREEN NEGATIVE CASTS/LPF NEGATIVE CRYSTALS 
TRACE AMORPH MUCOUS THRDS FEW BACTERIA NEGATIVE EPITH CELLS FEW SQUAMOUS 
TRICHOMONAS NEGATIVE YEAST NEGATIVE WBC 6.3 RBC 4.57 HGB 13.90 g/dLHCT 40.30 %
MCV 88.0 fLMCH 30.40 pgMCHC 34.50 g/dLMPV 9.20 fLPLT 258 %NEUT 65.10 %%LYMP 
28.30 %#NEUT 4.10 #LYMP 1.80 

 

 2010 3:48 PM  WBC 12.9 RBC 4.02 HGB 12.60 g/dLHCT 37.40 %MCV 93.0 fLMCH 
31.30 pgMCHC 33.70 g/dLRDW CV 14.10 %MPV 9.50 fLPLT 263 %NEUT 69.20 %%LYMP 
22.30 %%MONO 7.60 %%EOS 0.70 %%BASO 0.20 %#NEUT 8.91 #LYMP 2.88 #MONO 0.98 #EOS 
0.09 #BASO 0.03 

 

 10/22/2010 8:21 PM  COLOR YELLOW APPEARANCE CLEAR SPEC GRAV 1.010 pH 6.5 
PROTEIN NEGATIVE GLUCOSE NEGATIVE KETONE 15 BILIRUBIN NEGATIVE BLOOD NEGATIVE 
NITRITE NEGATIVE LEUK SCREEN SMALL CASTS/LPF NEGATIVE CRYSTALS NEGATIVE MUCOUS 
THRDS NEGATIVE BACTERIA 2++ EPITH CELLS 3+++ SQUAMOUS TRICHOMONAS NEGATIVE 
YEAST NEGATIVE 

 

 2010 11:58 PM  COLOR YELLOW APPEARANCE CLEAR SPEC GRAV 1.015 pH 6.5 
PROTEIN NEGATIVE GLUCOSE NEGATIVE KETONE NEGATIVE BILIRUBIN NEGATIVE BLOOD 
NEGATIVE NITRITE NEGATIVE LEUK SCREEN LARGE CASTS/LPF NEGATIVE CRYSTALS 
NEGATIVE MUCOUS THRDS NEGATIVE BACTERIA 2++ EPITH CELLS 2++ SQUAMOUS 
TRICHOMONAS NEGATIVE YEAST NEGATIVE 

 

 11/15/2010 5:30 PM  .0 IU/LURIC ACID 3.7 mg/dLGLUCOSE 66.0 mg/dLSODIUM 
137.0 mmol/LPOTASSIUM 3.10 mmol/LCHLORIDE 105.0 mmol/LCO2 21.0 mmol/LBUN 3.0 mg/
dLCREATININE 0.50 mg/dLSGOT/AST 20.0 IU/LSGPT/ALT 18.0 IU/LALK PHOS 152.0 IU/
LTOTAL PROTEIN 6.20 g/dLALBUMIN 3.40 g/dLTOTAL BILI 0.60 mg/dLCALCIUM 9.10 mg/
dLeGFR >60 mL/min/1.73 m2WBC 16.9 RBC 4.18 HGB 13.30 g/dLHCT 39.70 %MCV 95.0 
fLMCH 31.80 pgMCHC 33.50 g/dLRDW CV 14.0 %MPV 10.10 fLPLT 268 %NEUT 75.70 %%
LYMP 17.50 %%MONO 6.20 %%EOS 0.40 %%BASO 0.20 %#NEUT 12.82 #LYMP 2.97 #MONO 
1.05 #EOS 0.06 #BASO 0.03 

 

 11/15/2010 8:50 PM  PROTEIN UR 14.0 mg/dL

 

 2016 12:21 PM  Pap Smear Collected 

 

 2016 2:00 PM  WBC 9.8 RBC 4.60 HGB 13.60 g/dLHCT 41.40 %MCV 90.0 fLMCH 
29.60 pgMCHC 32.90 g/dLRDW CV 13.80 %MPV 9.70 fLPLT 271 %NEUT 59.80 %%LYMP 33.0 
%%MONO 6.40 %%EOS 0.60 %%BASO 0.20 %#NEUT 5.83 #LYMP 3.22 #MONO 0.62 #EOS 0.06 #
BASO 0.02 HIV AG/AB COMBO 0.14 RPR Non Reactive Rubella Antibodies, IgG 2.88 
Index

 

 2016 2:08 PM  COLOR YELLOW APPEARANCE CLEAR SPEC GRAV <=1.005 pH 6.0 
PROTEIN NEGATIVE GLUCOSE NEGATIVE mg/dLKETONE NEGATIVE BILIRUBIN NEGATIVE BLOOD 
NEGATIVE NITRITE NEGATIVE LEUK SCREEN TRACE CASTS/LPF NEGATIVE /LPFCRYSTALS 
NEGATIVE MUCOUS THRDS NEGATIVE BACTERIA NEGATIVE EPITH CELLS 3+++ SQUAMOUS /
HPFTRICHOMONAS NEGATIVE YEAST NEGATIVE 

 

 3/28/2016 2:18 PM  AFP Value 0.0240 ug/mLAFP MoM 1.02 hCG Value 16361.0 mIU/
mLhCG MoM 1.77 uE3 Value 0.70 ng/mLuE3 MoM 1.16 MARISA Value 207.520 pg/mLDIA MoM 
1.25 OSBR Risk       1 IN 12953 DSR (Second Trimester) 1IN 4435 DSR (By Age)    
1 IN 1003 T18 Risk Not increased T18 (By Age) 1:3907 







History Of Immunizations







 Name  Date Admin  Mfg Name  Mfg Code  Trade Name  Lot#  Route  Inj  Vis Given  
Vis Pub  CVX

 

 HPV  2011  [employer]  MSD  GARDASIL  [id number]  Intramuscular  Left 
Deltoid  2011  999

 

 HPV  2011  [employer]  MSD  GARDASIL  13792  Intramuscular  Left 
Deltoid  2011  62







History of Past Illness







 Name  Date of Onset  Comments

 

 Atrial Septal Defect, Ostium Secundum Type      

 

 Prenatal Care, High Risk Pregnancy  2010   

 

    Apr 15 2010  4:29PM   

 

 Prenatal Care, High Risk Pregnancy  Apr 15 2010  4:13PM   

 

    2010  9:54AM   

 

 Prenatal Care, High Risk Pregnancy  2010 10:10AM   

 

    May 13 2010  9:01AM   

 

    John 10 2010  2:45PM   

 

    2010  4:16PM   

 

 Pregnancy, First Normal  Aug 30 2010  9:14AM   

 

 Pregnancy, High Risk  Sep 13 2010  3:13PM   

 

 Group B Strep Screening,   Oct 14 2010  5:43PM   

 

 Pregnancy, High Risk  Oct 14 2010  5:43PM   

 

 Postoperative Examination Following Surgery  Dec  6 2010  9:15AM   

 

 IMPLANON  Insertion  Dec 15 2010 10:44AM   

 

 General Medical Exam, Child  2011  8:58AM   

 

 Contraceptive Management  2011  8:58AM   

 

 Routine gynecological examination  Jul 15 2011 10:03AM   

 

 Contraceptive Counseling  Jul 15 2011 10:03AM   

 

 Vaginal Discharge  Jul 15 2011 10:03AM   

 

 Irregular Menses  Jul 15 2011 10:03AM   

 

 G[place]il (HPV)  2011  8:54AM   

 

 Irregular Menses  Aug 31 2011  9:07AM   

 

 Family Planning  Aug 31 2011  9:07AM   

 

 Contraceptive Management  Andreas  3 2012  9:43AM   

 

 Local Infection  Andreas  3 2012  9:43AM   

 

 Well Child Examination  2012  1:46PM   

 

 Right knee pain  2012  1:46PM   

 

 Right knee pain  2012  1:24PM   

 

 Abscess  2012  1:24PM   

 

 Right knee pain  Mar 26 2012 10:32AM   

 

 General Medical Exam, Adult  2012  1:46PM   

 

 Pregnancy test confirmed positive  2016 11:37AM   

 

 Obesity  2016 11:37AM   

 

 Normal pregnancy in multigravida in second trimester  Mar 28 2016  2:01PM   







Payers







 Insurance Name  Company Name  Plan Name  Plan Number  Policy Number  Policy 
Group Number  Start Date

 

    [facility]  
   [id number]     N/A

 

    [place] [employer]  [place] [employer]     
[id number]     N/A

 

    [id number] Medicare A  Test Medicare A     void     N/A







History of Encounters







 Visit Date  Visit Type  Provider

 

 2016  Office visit  Dr. Antonella Ventura MD

 

 3/28/2016  Office visit  Dr. Antonella Ventura MD

 

 2016  Office visit  Dr. Antonella Ventura MD

 

 2016  Office visit  Dr. Antonella Ventura MD

 

 2016  Office visit  Velma Mendieta APRN

 

 3/26/2012  Office visit  Ivanna Swan APRN

 

 2012  Office visit  Ivanna Swan APRN

 

 2012  Office visit  Ivanna Swan APRN

 

 1/3/2012  Office visit  Ivanna Swan APRN

 

 2011  Nurse visit  Ivanna Swan APRN

 

 2011  Nurse visit  Ivanna Swan APRN

 

 7/15/2011  Office visit  Ivanna Swan APRN

 

 2011  Office visit  Ivanna Swan APRN

 

 12/15/2010  Office visit  Willard Sorensen MD

 

 2010  Surgery  Willard Sorensen MD

 

 2010  [facility]  Willard Sorensen MD

 

 11/15/2010  Office visit  Willard Sorensen MD

 

 11/15/2010  [facility]  Willard Sorensen MD

 

 2010  Office visit  Willard Sorensen MD

 

 2010  Office visit  Willard Sorensen MD

 

 10/28/2010  Office visit  Willard Sorensen MD

 

 10/21/2010  Office visit  Willard Sorensen MD

 

 10/14/2010  Office visit  Willard Sorensen MD

 

 10/9/2010  [facility]  Willard Sorensen MD

 

 10/7/2010  Office visit  Willard Sorensen MD

 

 2010  Office visit  Willard Sorensen MD

 

 2010  Office visit  Willard Sorensen MD

 

 2010  Office visit  Willard Sorensen MD

 

 2010  Office visit  Willard Sorensen MD

 

 2010  Office visit  Willard Sorensen MD

 

 6/10/2010  Office visit  Willard Sorensen MD

 

 2010  Office visit  Willard Sorensen MD

 

 2010  Office visit  Willard Sorensen MD

 

 4/15/2010  Office visit  Willard Sorensen MD

## 2018-11-29 NOTE — XMS REPORT
MU2 Ambulatory Summary

 Created on: 2016



Radha Aviles

External Reference #: [id number]

: 1990

Sex: Female



Demographics







 Address  [address] [place] [address] [address]

[place]  [address]

 

 Home Phone  [phone]

 

 Preferred Language  English

 

 Marital Status  Never 

 

 Synagogue Affiliation  Unknown

 

 Race  White

 

 Ethnic Group  Not  or 





Author







 Author  Megha Piedra

 

 [facility]

 

 Address  [address]

[place], [place]  [id number]



 

 Phone  [phone]







Care Team Providers







 Care Team Member Name  Role  Phone

 

 Megha Piedra  PCP  Unavailable







Allergies and Adverse Reactions







 Name  Reaction  Notes

 

 NO KNOWN DRUG ALLERGIES      







Plan of Treatment







 Planned Activity  Comments  Planned Date  Planned Time  Plan/Goal

 

 OB US LIMITED FETUS(S)     2016  12:00 AM   







Medications







  

 

 Name  Start Date  Expiration Date  SIG  Comments

 

 NataFort 60 mg iron-1 mg oral tablet  4/15/2010  3/11/2011  take 1 tablet by 
oral route once daily for 30 days   

 

 Diflucan 150 mg oral tablet  2010  take 1 tablet (150 mg) by 
oral route once  for 1 days   

 

 Premarin 1.25 mg oral tablet  2011  take 1 tablet by oral route 
daily for 10 days   

 

 doxycycline hyclate 100 mg oral tablet  7/15/2011  2011  take 1 tablet (
100 mg) by oral route every 12 hours for 10 days   

 

 ibuprofen 800 mg oral tablet  2012  take 1 tablet by oral route 
3 times a day for 30 days   

 

 Bactrim -160 mg oral tablet  2012  take 1 tablet by oral 
route every 12 hours for 7 days   

 

 Macrobid 100 mg oral capsule  2016  take 1 capsule (100 mg) by 
oral route 2 times per day with food for 7 days   









 Discontinued 

 

 Name  Start Date  Discontinued Date  SIG  Comments

 

 atenolol 25 mg oral tablet     2011  take 1/2 tablet by oral route QD   

 

 Medrol (Antwan) 4 mg oral tablets,dose pack  2012  3/26/2012  take as 
directed   







Problem List







 Description  Status  Onset

 

 Prenatal Care, High Risk Pregnancy  Active  2010







Vital Signs







 Date  Time  BP-Sys(mm[Hg]  BP-Marisa(mm[Hg])  HR(bpm)  RR(rpm)  Temp  WT  HT  HC  
BMI  BSA  BMI Percentile  O2 Sat(%)

 

 2016  3:19:00 PM  121 mmHg  59 mmHg  91 bpm        178 lbs  64 in     
30.55 kg/m2  1.91 m2      

 

 2016  11:15:00 AM  130 mmHg  70 mmHg  80 bpm  20 rpm     214 lbs  64 in  
   36.7326 kg/m  2.0936 m      

 

 3/26/2012  10:30:00 AM  126 mmHg  62 mmHg  106 bpm  18 rpm  96.8 F  205 lbs  
64 in     35.19 kg/m2  2.05 m2     97 %

 

 2012  1:22:00 PM  120 mmHg  68 mmHg  66 bpm  18 rpm  97 F  202.375 lbs  
64 in     34.7372 kg/m  2.036 m      

 

 2012  1:44:00 PM  122 mmHg  64 mmHg  68 bpm  18 rpm  98.2 F  198.125 lbs  
64 in     34.01 kg/m2  2.01 m2      

 

 1/3/2012  9:40:00 AM  103 mmHg  77 mmHg  64 bpm  18 rpm  97.7 F  197.125 lbs  
64 in     33.8361 kg/m  2.0094 m      

 

 2011  9:06:00 AM  118 mmHg  68 mmHg  74 bpm     97.4 F  189 lbs          
        

 

 7/15/2011  10:06:00 AM  126 mmHg  70 mmHg  73 bpm  20 rpm  97.9 F  180.5 lbs  
64 in     30.9824 kg/m  1.9228 m  0 %  99 %

 

 2011  8:56:00 AM  96 mmHg  68 mmHg  66 bpm  20 rpm  97.5 F  177.5 lbs  64 
in     30.47 kg/m2  1.91 m2  0 %  98 %

 

 12/15/2010  10:43:00 AM  104 mmHg  64 mmHg  48 bpm     97.8 F  150 lbs        
          

 

 2010  9:15:00 AM  112 mmHg  72 mmHg  49 bpm     98 F  149 lbs  64 in     
25.58 kg/m2  1.75 m2  81.5 %   

 

 2010  8:54:00 AM  117 mmHg  65 mmHg  73 bpm  20 rpm  97.8 F  160.5 lbs   
               

 

 2010  9:42:00 AM  134 mmHg  66 mmHg  66 bpm        161 lbs               
   

 

 4/15/2010  4:02:00 PM  135 mmHg  71 mmHg  77 bpm        162 lbs  64 in     
27.807 kg/m  1.8216 m  89.9 %   







Social History







 Name  Description  Comments

 

 History of tobacco usage     Quit smoking in January - was smoking 1-2cig/day

 

 denies alcohol use      

 

 Tobacco  Former smoker   







History of Procedures







 Date Ordered  Description  Order Status

 

 2011 12:00 AM  URINE PREGNANCY TEST  Reviewed

 

 2011 12:00 AM  IMMUNIZATION ADMIN  Reviewed

 

 2011 12:00 AM  HPV VACCINE 4 VALENT IM  Reviewed

 

 2016 12:00 AM  CYTOPATH C/V THIN LAYER  Returned

 

 2016 12:00 AM  SPECIMEN HANDLING OFFICE-LAB  Reviewed

 

 2016 12:00 AM  N.GONORRHOEAE DNA AMP PROB  Returned

 

 2016 12:00 AM  CHLAMYDIA CULTURE  Returned

 

 2016 12:00 AM  HIV-1ANTIBODY  Returned

 

 2016 12:00 AM  URINALYSIS AUTO W/SCOPE  Returned

 

 2016 12:00 AM  OBSTETRIC PANEL  Returned

 

 2016 12:00 AM  GLUCOSE TOLERANCE TEST (GTT)  Returned

 

 3/28/2016 12:00 AM  ALPHA-FETOPROTEIN SERUM  Returned

 

 3/28/2016 12:00 AM  CHORIONIC GONADOTROPIN TEST  Returned

 

 3/28/2016 12:00 AM  CHORIONIC GONADOTROPIN ASSAY  Returned

 

 5/3/2016 12:00 AM  OB US >/=14 WKS SNGL FETUS  Returned

 

 2016 12:00 AM  RH IG FULL-DOSE IM  Returned

 

 2016 12:00 AM  Type and screen  Returned

 

 2016 12:00 AM  GLUCOSE TOLERANCE TEST (GTT)  Returned

 

 2016 12:00 AM  COMPLETE CBC W/AUTO DIFF WBC  Returned

 

 2012 12:00 AM  X-RAY EXAM KNEE 4 OR MORE  Returned

 

 2016 12:00 AM  TDAP VACCINE 7 YRS/> IM  Reviewed

 

 2016 12:00 AM  IMMUNIZATION ADMIN  Reviewed

 

 2016 12:00 AM  CULTURE SCREEN ONLY  Returned

 

 2016 12:00 AM  OB US LIMITED FETUS(S)  Returned

 

 4/15/2010 12:00 AM  OBSTETRIC PANEL  Reviewed

 

 4/15/2010 12:00 AM  HIV-1ANTIBODY  Reviewed

 

 4/15/2010 12:00 AM  URINALYSIS NONAUTO W/SCOPE  Reviewed

 

 4/15/2010 12:00 AM  OB US < 14 WKS SINGLE FETUS  Reviewed

 

 2010 12:00 AM  GLUCOSE TEST  Reviewed

 

 2010 12:00 AM  Rhogam  Reviewed

 

 2010 12:00 AM  Type and screen  Reviewed

 

 2010 12:00 AM  COMPLETE CBC W/AUTO DIFF WBC  Reviewed

 

 2010 12:00 AM  COMPLETE CBC W/AUTO DIFF WBC  Reviewed

 

 10/14/2010 12:00 AM  CULTURE OTHR SPECIMN AEROBIC  Reviewed

 

 12/15/2010 12:00 AM  INSERTION IMPLANON, IMPLANTABLE CONTRACEPTIVE CAPSULES  
Reviewed

 

 12/15/2010 12:00 AM  Etonogestrel (contraceptive) implant system, (Implanon)  
Reviewed

 

 7/15/2011 12:00 AM  CYTOPATH TBS C/V MANUAL  Reviewed

 

 7/15/2011 12:00 AM  CHLAMYDIA CULTURE  Reviewed

 

 7/15/2011 12:00 AM  N.GONORRHOEAE DNA AMP PROB  Reviewed

 

 7/15/2011 12:00 AM  URINE PREGNANCY TEST  Reviewed

 

 2011 12:00 AM  IMMUNIZATION ADMIN  Reviewed

 

 2011 12:00 AM  HPV VACCINE 4 VALENT IM  Reviewed







Results Summary







 Data and Description  Results

 

 2010 10:41 AM  RUBELLA 34.0 IU/mLCOLOR YELLOW APPEARANCE HAZY SPEC GRAV 
1.025 pH 7.0 PROTEIN TRACE GLUCOSE NEGATIVE KETONE 15 BILIRUBIN NEGATIVE BLOOD 
NEGATIVE NITRITE NEGATIVE LEUK SCREEN NEGATIVE CASTS/LPF NEGATIVE CRYSTALS 
TRACE AMORPH MUCOUS THRDS FEW BACTERIA NEGATIVE EPITH CELLS FEW SQUAMOUS 
TRICHOMONAS NEGATIVE YEAST NEGATIVE WBC 6.3 RBC 4.57 HGB 13.90 g/dLHCT 40.30 %
MCV 88.0 fLMCH 30.40 pgMCHC 34.50 g/dLMPV 9.20 fLPLT 258 %NEUT 65.10 %%LYMP 
28.30 %#NEUT 4.10 #LYMP 1.80 

 

 2010 3:48 PM  WBC 12.9 RBC 4.02 HGB 12.60 g/dLHCT 37.40 %MCV 93.0 fLMCH 
31.30 pgMCHC 33.70 g/dLRDW CV 14.10 %MPV 9.50 fLPLT 263 %NEUT 69.20 %%LYMP 
22.30 %%MONO 7.60 %%EOS 0.70 %%BASO 0.20 %#NEUT 8.91 #LYMP 2.88 #MONO 0.98 #EOS 
0.09 #BASO 0.03 

 

 10/22/2010 8:21 PM  COLOR YELLOW APPEARANCE CLEAR SPEC GRAV 1.010 pH 6.5 
PROTEIN NEGATIVE GLUCOSE NEGATIVE KETONE 15 BILIRUBIN NEGATIVE BLOOD NEGATIVE 
NITRITE NEGATIVE LEUK SCREEN SMALL CASTS/LPF NEGATIVE CRYSTALS NEGATIVE MUCOUS 
THRDS NEGATIVE BACTERIA 2++ EPITH CELLS 3+++ SQUAMOUS TRICHOMONAS NEGATIVE 
YEAST NEGATIVE 

 

 2010 11:58 PM  COLOR YELLOW APPEARANCE CLEAR SPEC GRAV 1.015 pH 6.5 
PROTEIN NEGATIVE GLUCOSE NEGATIVE KETONE NEGATIVE BILIRUBIN NEGATIVE BLOOD 
NEGATIVE NITRITE NEGATIVE LEUK SCREEN LARGE CASTS/LPF NEGATIVE CRYSTALS 
NEGATIVE MUCOUS THRDS NEGATIVE BACTERIA 2++ EPITH CELLS 2++ SQUAMOUS 
TRICHOMONAS NEGATIVE YEAST NEGATIVE 

 

 11/15/2010 5:30 PM  .0 IU/LURIC ACID 3.7 mg/dLGLUCOSE 66.0 mg/dLSODIUM 
137.0 mmol/LPOTASSIUM 3.10 mmol/LCHLORIDE 105.0 mmol/LCO2 21.0 mmol/LBUN 3.0 mg/
dLCREATININE 0.50 mg/dLSGOT/AST 20.0 IU/LSGPT/ALT 18.0 IU/LALK PHOS 152.0 IU/
LTOTAL PROTEIN 6.20 g/dLALBUMIN 3.40 g/dLTOTAL BILI 0.60 mg/dLCALCIUM 9.10 mg/
dLeGFR >60 mL/min/1.73 m2WBC 16.9 RBC 4.18 HGB 13.30 g/dLHCT 39.70 %MCV 95.0 
fLMCH 31.80 pgMCHC 33.50 g/dLRDW CV 14.0 %MPV 10.10 fLPLT 268 %NEUT 75.70 %%
LYMP 17.50 %%MONO 6.20 %%EOS 0.40 %%BASO 0.20 %#NEUT 12.82 #LYMP 2.97 #MONO 
1.05 #EOS 0.06 #BASO 0.03 

 

 11/15/2010 8:50 PM  PROTEIN UR 14.0 mg/dL

 

 2016 12:21 PM  Pap Smear Collected 

 

 2016 2:00 PM  WBC 9.8 RBC 4.60 HGB 13.60 g/dLHCT 41.40 %MCV 90.0 fLMCH 
29.60 pgMCHC 32.90 g/dLRDW CV 13.80 %MPV 9.70 fLPLT 271 %NEUT 59.80 %%LYMP 33.0 
%%MONO 6.40 %%EOS 0.60 %%BASO 0.20 %#NEUT 5.83 #LYMP 3.22 #MONO 0.62 #EOS 0.06 #
BASO 0.02 HIV AG/AB COMBO 0.14 RPR Non Reactive Rubella Antibodies, IgG 2.88 
Index

 

 2016 2:08 PM  COLOR YELLOW APPEARANCE CLEAR SPEC GRAV <=1.005 pH 6.0 
PROTEIN NEGATIVE GLUCOSE NEGATIVE mg/dLKETONE NEGATIVE BILIRUBIN NEGATIVE BLOOD 
NEGATIVE NITRITE NEGATIVE LEUK SCREEN TRACE CASTS/LPF NEGATIVE /LPFCRYSTALS 
NEGATIVE MUCOUS THRDS NEGATIVE BACTERIA NEGATIVE EPITH CELLS 3+++ SQUAMOUS /
HPFTRICHOMONAS NEGATIVE YEAST NEGATIVE 

 

 3/28/2016 2:18 PM  AFP Value 0.0240 ug/mLAFP MoM 1.02 hCG Value 23433.0 mIU/
mLhCG MoM 1.77 uE3 Value 0.70 ng/mLuE3 MoM 1.16 MARISA Value 207.520 pg/mLDIA MoM 
1.25 OSBR Risk       1 IN 14243 DSR (Second Trimester) 1IN 4435 DSR (By Age)    
1 IN 1003 T18 Risk Not increased T18 (By Age) 1:3907 

 

 2016 3:15 PM  WBC 11.5 RBC 3.75 HGB 11.70 g/dLHCT 35.50 %MCV 95.0 fLMCH 
31.20 pgMCHC 33.0 g/dLRDW CV 14.10 %MPV 9.70 fLPLT 273 %NEUT 73.0 %%LYMP 20.10 %
%MONO 5.70 %%EOS 0.80 %%BASO 0.10 %#NEUT 8.40 #LYMP 2.31 #MONO 0.65 #EOS 0.09 #
BASO 0.01 







History Of Immunizations







 Name  Date Admin  Mfg Name  Mfg Code  Trade Name  Lot#  Route  Inj  Vis Given  
Vis Pub  CVX

 

 HPV  2011  [employer]  MSD  GARDASIL  [id number]  Intramuscular  Left 
Deltoid  2011  999

 

 HPV  2011  [employer]  MSD  GARDASIL  [id number]  Intramuscular  Left 
Deltoid  2011  62

 

 Tdap  2016  Restlet  SKB  BOOSTRIX  [id number]  Intramuscular  Right 
Deltoid  2016  115







History of Past Illness







 Name  Date of Onset  Comments

 

 Atrial Septal Defect, Ostium Secundum Type      

 

 Prenatal Care, High Risk Pregnancy  2010   

 

    Apr 15 2010  4:29PM   

 

 Prenatal Care, High Risk Pregnancy  Apr 15 2010  4:13PM   

 

    2010  9:54AM   

 

 Prenatal Care, High Risk Pregnancy  2010 10:10AM   

 

    May 13 2010  9:01AM   

 

    John 10 2010  2:45PM   

 

    2010  4:16PM   

 

 Pregnancy, First Normal  Aug 30 2010  9:14AM   

 

 Pregnancy, High Risk  Sep 13 2010  3:13PM   

 

 Group B Strep Screening,   Oct 14 2010  5:43PM   

 

 Pregnancy, High Risk  Oct 14 2010  5:43PM   

 

 Postoperative Examination Following Surgery  Dec  6 2010  9:15AM   

 

 IMPLANON  Insertion  Dec 15 2010 10:44AM   

 

 General Medical Exam, Child  2011  8:58AM   

 

 Contraceptive Management  2011  8:58AM   

 

 Routine gynecological examination  Jul 15 2011 10:03AM   

 

 Contraceptive Counseling  Jul 15 2011 10:03AM   

 

 Vaginal Discharge  Jul 15 2011 10:03AM   

 

 Irregular Menses  Jul 15 2011 10:03AM   

 

 Gardsil (HPV)  2011  8:54AM   

 

 Irregular Menses  Aug 31 2011  9:07AM   

 

 Family Planning  Aug 31 2011  9:07AM   

 

 Contraceptive Management  Andreas  3 2012  9:43AM   

 

 Local Infection  Andreas  3 2012  9:43AM   

 

 Well Child Examination  2012  1:46PM   

 

 Right knee pain  2012  1:46PM   

 

 Right knee pain  2012  1:24PM   

 

 Abscess  2012  1:24PM   

 

 Right knee pain  Mar 26 2012 10:32AM   

 

 General Medical Exam, Adult  2012  1:46PM   

 

 Pregnancy test confirmed positive  2016 11:37AM   

 

 Obesity  2016 11:37AM   

 

 Normal pregnancy in multigravida in second trimester  Mar 28 2016  2:01PM   

 

 History of atrial septal defect repair  May 11 2016  8:50AM   

 

 Normal pregnancy in multigravida in second trimester  2016  2:00PM   

 

 Need for Tdap vaccine  2016  1:38PM   

 

 Group B Strep Screening,   Aug 23 2016  3:43PM   

 

 Normal pregnancy in multigravida in third trimester  Aug 23 2016  3:43PM   

 

 Small for gestational age fetus affecting management of mother in [place] 
pregnancy in third trimester  Sep  7 2016  4:50PM   

 

 Third trimester pregnancy  Sep  7 2016  3:20PM   







Payers







 Insurance Name  Company Name  Plan Name  Plan Number  Policy Number  Policy 
Group Number  Start Date

 

    [facility] [employer] [facility]  
   [id number]     N/A

 

    [place] [employer] [facility]     
[id number]     N/A

 

    zzzTest Medicare A  Test Medicare A     void     N/A







History of Encounters







 Visit Date  Visit Type  Provider

 

 2016  Office visit  Megha Piedra DO

 

 2016  Office visit  Dr. Antonella Ventura MD

 

 2016  Office visit  Dr. Antonella Ventura MD

 

 2016  Office visit  Dr. Antonella Ventura MD

 

 2016  Office visit  Dr. Antonella Ventura MD

 

 2016  Office visit  Dr. Antonella Ventura MD

 

 2016  Office visit  Dr. Antonella Ventura MD

 

 3/28/2016  Office visit  Dr. Antonella Ventura MD

 

 2016  Office visit  Dr. Antonella Ventura MD

 

 2016  Office visit  Dr. Antonella Ventura MD

 

 2016  Office visit  Velma Mendieta APRN

 

 3/26/2012  Office visit  Ivanna Swan APRN

 

 2012  Office visit  Ivanna Swan APRN

 

 2012  Office visit  Ivanna Swan APRN

 

 1/3/2012  Office visit  Ivanna Swan APRN

 

 2011  Nurse visit  Ivanna Walker APRN

 

 2011  Nurse visit  Ivanna Swan APRN

 

 7/15/2011  Office visit  Ivanna Swan APRN

 

 2011  Office visit  Ivanna Swan APRN

 

 12/15/2010  Office visit  Willard Sorensen MD

 

 2010  Surgery  Willard Sorensen MD

 

 2010  [facility]  Willard Sorensen MD

 

 11/15/2010  Office visit  Willard Sorensen MD

 

 11/15/2010  [facility]  Willard Sorensen MD

 

 2010  Office visit  Willard Sorensen MD

 

 2010  Office visit  Willard Sorensen MD

 

 10/28/2010  Office visit  Willard Sorensen MD

 

 10/21/2010  Office visit  Willard Sorensen MD

 

 10/14/2010  Office visit  Willard Sorensen MD

 

 10/9/2010  [facility]  Willard Sorensen MD

 

 10/7/2010  Office visit  Willard Sorensen MD

 

 2010  Office visit  Willard Sorensen MD

 

 2010  Office visit  Willard Sorensen MD

 

 2010  Office visit  Willard Sorensen MD

 

 2010  Office visit  Willard Sorensen MD

 

 2010  Office visit  Willard Sorensen MD

 

 6/10/2010  Office visit  Willard Sorensen MD

 

 2010  Office visit  Willard Sorensen MD

 

 2010  Office visit  Willard Sorensen MD

 

 4/15/2010  Office visit  Willard Sorensen MD

## 2018-11-29 NOTE — XMS REPORT
MU2 Ambulatory Summary

 Created on: 2016



Radha Aviles

External Reference #: [id number]

: 1990

Sex: Female



Demographics







 Address  [address] [place] [address] [address]

[place]  [address]

 

 Home Phone  [phone]

 

 Preferred Language  English

 

 Marital Status  Never 

 

 Shinto Affiliation  Unknown

 

 Race  White

 

 Ethnic Group  Not  or 





Author







 Author  Megha Piedra

 

 [facility]

 

 Address  [address]

[place], [place]  [id number]



 

 Phone  [phone]







Care Team Providers







 Care Team Member Name  Role  Phone

 

 Megha Pierda  PCP  Unavailable







Allergies and Adverse Reactions







 Name  Reaction  Notes

 

 NO KNOWN DRUG ALLERGIES      







Plan of Treatment







 Planned Activity  Comments  Planned Date  Planned Time  Plan/Goal

 

 OB US LIMITED FETUS(S)     2016  12:00 AM   

 

 OB US LIMITED FETUS(S)     2016  12:00 AM   







Medications







  

 

 Name  Start Date  Expiration Date  SIG  Comments

 

 NataFort 60 mg iron-1 mg oral tablet  4/15/2010  3/11/2011  take 1 tablet by 
oral route once daily for 30 days   

 

 Diflucan 150 mg oral tablet  2010  take 1 tablet (150 mg) by 
oral route once  for 1 days   

 

 Premarin 1.25 mg oral tablet  2011  take 1 tablet by oral route 
daily for 10 days   

 

 doxycycline hyclate 100 mg oral tablet  7/15/2011  2011  take 1 tablet (
100 mg) by oral route every 12 hours for 10 days   

 

 ibuprofen 800 mg oral tablet  2012  take 1 tablet by oral route 
3 times a day for 30 days   

 

 Bactrim -160 mg oral tablet  2012  take 1 tablet by oral 
route every 12 hours for 7 days   

 

 Macrobid 100 mg oral capsule  2016  take 1 capsule (100 mg) by 
oral route 2 times per day with food for 7 days   









 Discontinued 

 

 Name  Start Date  Discontinued Date  SIG  Comments

 

 atenolol 25 mg oral tablet     2011  take 1/2 tablet by oral route QD   

 

 Medrol (Antwan) 4 mg oral tablets,dose pack  2012  3/26/2012  take as 
directed   







Problem List







 Description  Status  Onset

 

 Prenatal Care, High Risk Pregnancy  Active  2010







Vital Signs







 Date  Time  BP-Sys(mm[Hg]  BP-Marisa(mm[Hg])  HR(bpm)  RR(rpm)  Temp  WT  HT  HC  
BMI  BSA  BMI Percentile  O2 Sat(%)

 

 2016  3:19:00 PM  121 mmHg  59 mmHg  91 bpm        178 lbs  64 in     
30.55 kg/m2  1.91 m2      

 

 2016  11:15:00 AM  130 mmHg  70 mmHg  80 bpm  20 rpm     214 lbs  64 in  
   36.7326 kg/m  2.0936 m      

 

 3/26/2012  10:30:00 AM  126 mmHg  62 mmHg  106 bpm  18 rpm  96.8 F  205 lbs  
64 in     35.19 kg/m2  2.05 m2     97 %

 

 2012  1:22:00 PM  120 mmHg  68 mmHg  66 bpm  18 rpm  97 F  202.375 lbs  
64 in     34.7372 kg/m  2.036 m      

 

 2012  1:44:00 PM  122 mmHg  64 mmHg  68 bpm  18 rpm  98.2 F  198.125 lbs  
64 in     34.01 kg/m2  2.01 m2      

 

 1/3/2012  9:40:00 AM  103 mmHg  77 mmHg  64 bpm  18 rpm  97.7 F  197.125 lbs  
64 in     33.8361 kg/m  2.0094 m      

 

 2011  9:06:00 AM  118 mmHg  68 mmHg  74 bpm     97.4 F  189 lbs          
        

 

 7/15/2011  10:06:00 AM  126 mmHg  70 mmHg  73 bpm  20 rpm  97.9 F  180.5 lbs  
64 in     30.9824 kg/m  1.9228 m  0 %  99 %

 

 2011  8:56:00 AM  96 mmHg  68 mmHg  66 bpm  20 rpm  97.5 F  177.5 lbs  64 
in     30.47 kg/m2  1.91 m2  0 %  98 %

 

 12/15/2010  10:43:00 AM  104 mmHg  64 mmHg  48 bpm     97.8 F  150 lbs        
          

 

 2010  9:15:00 AM  112 mmHg  72 mmHg  49 bpm     98 F  149 lbs  64 in     
25.58 kg/m2  1.75 m2  81.5 %   

 

 2010  8:54:00 AM  117 mmHg  65 mmHg  73 bpm  20 rpm  97.8 F  160.5 lbs   
               

 

 2010  9:42:00 AM  134 mmHg  66 mmHg  66 bpm        161 lbs               
   

 

 4/15/2010  4:02:00 PM  135 mmHg  71 mmHg  77 bpm        162 lbs  64 in     
27.807 kg/m  1.8216 m  89.9 %   







Social History







 Name  Description  Comments

 

 History of tobacco usage     Quit smoking in January - was smoking 1-2cig/day

 

 denies alcohol use      

 

 Tobacco  Former smoker   







History of Procedures







 Date Ordered  Description  Order Status

 

 2011 12:00 AM  URINE PREGNANCY TEST  Reviewed

 

 2011 12:00 AM  IMMUNIZATION ADMIN  Reviewed

 

 2011 12:00 AM  HPV VACCINE 4 VALENT IM  Reviewed

 

 2016 12:00 AM  CYTOPATH C/V THIN LAYER  Returned

 

 2016 12:00 AM  SPECIMEN HANDLING OFFICE-LAB  Reviewed

 

 2016 12:00 AM  N.GONORRHOEAE DNA AMP PROB  Returned

 

 2016 12:00 AM  CHLAMYDIA CULTURE  Returned

 

 2016 12:00 AM  HIV-1ANTIBODY  Returned

 

 2016 12:00 AM  URINALYSIS AUTO W/SCOPE  Returned

 

 2016 12:00 AM  OBSTETRIC PANEL  Returned

 

 2016 12:00 AM  GLUCOSE TOLERANCE TEST (GTT)  Returned

 

 3/28/2016 12:00 AM  ALPHA-FETOPROTEIN SERUM  Returned

 

 3/28/2016 12:00 AM  CHORIONIC GONADOTROPIN TEST  Returned

 

 3/28/2016 12:00 AM  CHORIONIC GONADOTROPIN ASSAY  Returned

 

 5/3/2016 12:00 AM  OB US >/=14 WKS SNGL FETUS  Returned

 

 2016 12:00 AM  RH IG FULL-DOSE IM  Returned

 

 2016 12:00 AM  Type and screen  Returned

 

 2016 12:00 AM  GLUCOSE TOLERANCE TEST (GTT)  Returned

 

 2016 12:00 AM  COMPLETE CBC W/AUTO DIFF WBC  Returned

 

 2012 12:00 AM  X-RAY EXAM KNEE 4 OR MORE  Returned

 

 2016 12:00 AM  TDAP VACCINE 7 YRS/> IM  Reviewed

 

 2016 12:00 AM  IMMUNIZATION ADMIN  Reviewed

 

 2016 12:00 AM  CULTURE SCREEN ONLY  Returned

 

 4/15/2010 12:00 AM  OBSTETRIC PANEL  Reviewed

 

 4/15/2010 12:00 AM  HIV-1ANTIBODY  Reviewed

 

 4/15/2010 12:00 AM  URINALYSIS NONAUTO W/SCOPE  Reviewed

 

 4/15/2010 12:00 AM  OB US < 14 WKS SINGLE FETUS  Reviewed

 

 2010 12:00 AM  GLUCOSE TEST  Reviewed

 

 2010 12:00 AM  Rhogam  Reviewed

 

 2010 12:00 AM  Type and screen  Reviewed

 

 2010 12:00 AM  COMPLETE CBC W/AUTO DIFF WBC  Reviewed

 

 2010 12:00 AM  COMPLETE CBC W/AUTO DIFF WBC  Reviewed

 

 10/14/2010 12:00 AM  CULTURE OTHR SPECIMN AEROBIC  Reviewed

 

 12/15/2010 12:00 AM  INSERTION IMPLANON, IMPLANTABLE CONTRACEPTIVE CAPSULES  
Reviewed

 

 12/15/2010 12:00 AM  Etonogestrel (contraceptive) implant system, (Implanon)  
Reviewed

 

 7/15/2011 12:00 AM  CYTOPATH TBS C/V MANUAL  Reviewed

 

 7/15/2011 12:00 AM  CHLAMYDIA CULTURE  Reviewed

 

 7/15/2011 12:00 AM  N.GONORRHOEAE DNA AMP PROB  Reviewed

 

 7/15/2011 12:00 AM  URINE PREGNANCY TEST  Reviewed

 

 2011 12:00 AM  IMMUNIZATION ADMIN  Reviewed

 

 2011 12:00 AM  HPV VACCINE 4 VALENT IM  Reviewed







Results Summary







 Data and Description  Results

 

 2010 10:41 AM  RUBELLA 34.0 IU/mLCOLOR YELLOW APPEARANCE HAZY SPEC GRAV 
1.025 pH 7.0 PROTEIN TRACE GLUCOSE NEGATIVE KETONE 15 BILIRUBIN NEGATIVE BLOOD 
NEGATIVE NITRITE NEGATIVE LEUK SCREEN NEGATIVE CASTS/LPF NEGATIVE CRYSTALS 
TRACE AMORPH MUCOUS THRDS FEW BACTERIA NEGATIVE EPITH CELLS FEW SQUAMOUS 
TRICHOMONAS NEGATIVE YEAST NEGATIVE WBC 6.3 RBC 4.57 HGB 13.90 g/dLHCT 40.30 %
MCV 88.0 fLMCH 30.40 pgMCHC 34.50 g/dLMPV 9.20 fLPLT 258 %NEUT 65.10 %%LYMP 
28.30 %#NEUT 4.10 #LYMP 1.80 

 

 2010 3:48 PM  WBC 12.9 RBC 4.02 HGB 12.60 g/dLHCT 37.40 %MCV 93.0 fLMCH 
31.30 pgMCHC 33.70 g/dLRDW CV 14.10 %MPV 9.50 fLPLT 263 %NEUT 69.20 %%LYMP 
22.30 %%MONO 7.60 %%EOS 0.70 %%BASO 0.20 %#NEUT 8.91 #LYMP 2.88 #MONO 0.98 #EOS 
0.09 #BASO 0.03 

 

 10/22/2010 8:21 PM  COLOR YELLOW APPEARANCE CLEAR SPEC GRAV 1.010 pH 6.5 
PROTEIN NEGATIVE GLUCOSE NEGATIVE KETONE 15 BILIRUBIN NEGATIVE BLOOD NEGATIVE 
NITRITE NEGATIVE LEUK SCREEN SMALL CASTS/LPF NEGATIVE CRYSTALS NEGATIVE MUCOUS 
THRDS NEGATIVE BACTERIA 2++ EPITH CELLS 3+++ SQUAMOUS TRICHOMONAS NEGATIVE 
YEAST NEGATIVE 

 

 2010 11:58 PM  COLOR YELLOW APPEARANCE CLEAR SPEC GRAV 1.015 pH 6.5 
PROTEIN NEGATIVE GLUCOSE NEGATIVE KETONE NEGATIVE BILIRUBIN NEGATIVE BLOOD 
NEGATIVE NITRITE NEGATIVE LEUK SCREEN LARGE CASTS/LPF NEGATIVE CRYSTALS 
NEGATIVE MUCOUS THRDS NEGATIVE BACTERIA 2++ EPITH CELLS 2++ SQUAMOUS 
TRICHOMONAS NEGATIVE YEAST NEGATIVE 

 

 11/15/2010 5:30 PM  .0 IU/LURIC ACID 3.7 mg/dLGLUCOSE 66.0 mg/dLSODIUM 
137.0 mmol/LPOTASSIUM 3.10 mmol/LCHLORIDE 105.0 mmol/LCO2 21.0 mmol/LBUN 3.0 mg/
dLCREATININE 0.50 mg/dLSGOT/AST 20.0 IU/LSGPT/ALT 18.0 IU/LALK PHOS 152.0 IU/
LTOTAL PROTEIN 6.20 g/dLALBUMIN 3.40 g/dLTOTAL BILI 0.60 mg/dLCALCIUM 9.10 mg/
dLeGFR >60 mL/min/1.73 m2WBC 16.9 RBC 4.18 HGB 13.30 g/dLHCT 39.70 %MCV 95.0 
fLMCH 31.80 pgMCHC 33.50 g/dLRDW CV 14.0 %MPV 10.10 fLPLT 268 %NEUT 75.70 %%
LYMP 17.50 %%MONO 6.20 %%EOS 0.40 %%BASO 0.20 %#NEUT 12.82 #LYMP 2.97 #MONO 
1.05 #EOS 0.06 #BASO 0.03 

 

 11/15/2010 8:50 PM  PROTEIN UR 14.0 mg/dL

 

 2016 12:21 PM  Pap Smear Collected 

 

 2016 2:00 PM  WBC 9.8 RBC 4.60 HGB 13.60 g/dLHCT 41.40 %MCV 90.0 fLMCH 
29.60 pgMCHC 32.90 g/dLRDW CV 13.80 %MPV 9.70 fLPLT 271 %NEUT 59.80 %%LYMP 33.0 
%%MONO 6.40 %%EOS 0.60 %%BASO 0.20 %#NEUT 5.83 #LYMP 3.22 #MONO 0.62 #EOS 0.06 #
BASO 0.02 HIV AG/AB COMBO 0.14 RPR Non Reactive Rubella Antibodies, IgG 2.88 
Index

 

 2016 2:08 PM  COLOR YELLOW APPEARANCE CLEAR SPEC GRAV <=1.005 pH 6.0 
PROTEIN NEGATIVE GLUCOSE NEGATIVE mg/dLKETONE NEGATIVE BILIRUBIN NEGATIVE BLOOD 
NEGATIVE NITRITE NEGATIVE LEUK SCREEN TRACE CASTS/LPF NEGATIVE /LPFCRYSTALS 
NEGATIVE MUCOUS THRDS NEGATIVE BACTERIA NEGATIVE EPITH CELLS 3+++ SQUAMOUS /
HPFTRICHOMONAS NEGATIVE YEAST NEGATIVE 

 

 3/28/2016 2:18 PM  AFP Value 0.0240 ug/mLAFP MoM 1.02 hCG Value 19240.0 mIU/
mLhCG MoM 1.77 uE3 Value 0.70 ng/mLuE3 MoM 1.16 MARISA Value 207.520 pg/mLDIA MoM 
1.25 OSBR Risk       1 IN 30281 DSR (Second Trimester) 1IN 4435 DSR (By Age)    
1 IN 1003 T18 Risk Not increased T18 (By Age) 1:3907 

 

 2016 3:15 PM  WBC 11.5 RBC 3.75 HGB 11.70 g/dLHCT 35.50 %MCV 95.0 fLMCH 
31.20 pgMCHC 33.0 g/dLRDW CV 14.10 %MPV 9.70 fLPLT 273 %NEUT 73.0 %%LYMP 20.10 %
%MONO 5.70 %%EOS 0.80 %%BASO 0.10 %#NEUT 8.40 #LYMP 2.31 #MONO 0.65 #EOS 0.09 #
BASO 0.01 







History Of Immunizations







 Name  Date Admin  Mfg Name  Mfg Code  Trade Name  Lot#  Route  Inj  Vis Given  
Vis Pub  CVX

 

 HPV  2011  [employer]  MSD  GARDASIL  [id number]  Intramuscular  Left 
Deltoid  2011  999

 

 HPV  2011  [employer]  MSD  GARDASIL  42220  Intramuscular  Left 
Deltoid  2011  62

 

 Tdap  2016  Digium  SKB  BOOSTRIX  [id number]  Intramuscular  Right 
Deltoid  2016  115







History of Past Illness







 Name  Date of Onset  Comments

 

 Atrial Septal Defect, Ostium Secundum Type      

 

 Prenatal Care, High Risk Pregnancy  2010   

 

    Apr 15 2010  4:29PM   

 

 Prenatal Care, High Risk Pregnancy  Apr 15 2010  4:13PM   

 

    2010  9:54AM   

 

 Prenatal Care, High Risk Pregnancy  2010 10:10AM   

 

    May 13 2010  9:01AM   

 

    John 10 2010  2:45PM   

 

    2010  4:16PM   

 

 Pregnancy, First Normal  Aug 30 2010  9:14AM   

 

 Pregnancy, High Risk  Sep 13 2010  3:13PM   

 

 Group B Strep Screening,   Oct 14 2010  5:43PM   

 

 Pregnancy, High Risk  Oct 14 2010  5:43PM   

 

 Postoperative Examination Following Surgery  Dec  6 2010  9:15AM   

 

 IMPLANON  Insertion  Dec 15 2010 10:44AM   

 

 General Medical Exam, Child  2011  8:58AM   

 

 Contraceptive Management  2011  8:58AM   

 

 Routine gynecological examination  Jul 15 2011 10:03AM   

 

 Contraceptive Counseling  Jul 15 2011 10:03AM   

 

 Vaginal Discharge  Jul 15 2011 10:03AM   

 

 Irregular Menses  Jul 15 2011 10:03AM   

 

 Gardsil (HPV)  2011  8:54AM   

 

 Irregular Menses  Aug 31 2011  9:07AM   

 

 Family Planning  Aug 31 2011  9:07AM   

 

 Contraceptive Management  Andreas  3 2012  9:43AM   

 

 Local Infection  Andreas  3 2012  9:43AM   

 

 Well Child Examination  2012  1:46PM   

 

 Right knee pain  2012  1:46PM   

 

 Right knee pain  2012  1:24PM   

 

 Abscess  2012  1:24PM   

 

 Right knee pain  Mar 26 2012 10:32AM   

 

 General Medical Exam, Adult  2012  1:46PM   

 

 Pregnancy test confirmed positive  2016 11:37AM   

 

 Obesity  2016 11:37AM   

 

 Normal pregnancy in multigravida in second trimester  Mar 28 2016  2:01PM   

 

 History of atrial septal defect repair  May 11 2016  8:50AM   

 

 Normal pregnancy in multigravida in second trimester  2016  2:00PM   

 

 Need for Tdap vaccine  2016  1:38PM   

 

 Group B Strep Screening,   Aug 23 2016  3:43PM   

 

 Normal pregnancy in multigravida in third trimester  Aug 23 2016  3:43PM   

 

 Small for gestational age fetus affecting management of mother in freeman 
pregnancy in third trimester  Sep  7 2016  4:50PM   







Payers







 Insurance Name  Company Name  Plan Name  Plan Number  Policy Number  Policy 
Group Number  Start Date

 

    [facility] - [facility] Plan [facility]  
   [id number]     N/A

 

    [employer] [employer]  [place] [employer]     
[id number]     N/A

 

    zzzTest Medicare A  Test Medicare A     void     N/A







History of Encounters







 Visit Date  Visit Type  Provider

 

 2016  Office visit  Megha Piedra DO

 

 2016  Office visit  Dr. Antonella Ventura MD

 

 2016  Office visit  Dr. Antonella Ventura MD

 

 2016  Office visit  Dr. Antonella Ventura MD

 

 2016  Office visit  Dr. Antonella Ventura MD

 

 2016  Office visit  Dr. Antonella Ventura MD

 

 2016  Office visit  Dr. Antonella Ventura MD

 

 3/28/2016  Office visit  Dr. Antonella Ventura MD

 

 2016  Office visit  Dr. Antonella Ventura MD

 

 2016  Office visit  Dr. Antonella Ventura MD

 

 2016  Office visit  Velma Mendieta APRN

 

 3/26/2012  Office visit  Ivanna Swan APRN

 

 2012  Office visit  Ivanna Swan APRN

 

 2012  Office visit  Ivanna Swan APRN

 

 1/3/2012  Office visit  Ivanna Walker APRN

 

 2011  Nurse visit  Ivanna Walker APRN

 

 2011  Nurse visit  Ivanna Walker APRN

 

 7/15/2011  Office visit  Ivanna Swan APRN

 

 2011  Office visit  Ivanna Swan APRN

 

 12/15/2010  Office visit  Willard Sorensen MD

 

 2010  Surgery  Willard Sorensen MD

 

 2010  [facility]  Willard Sorensen MD

 

 11/15/2010  Office visit  Willard Sorensen MD

 

 11/15/2010  [facility]  Willard Sorensen MD

 

 2010  Office visit  Willard Sorensen MD

 

 2010  Office visit  Willard Sorensen MD

 

 10/28/2010  Office visit  Willard Sorensen MD

 

 10/21/2010  Office visit  iWllard Sorensen MD

 

 10/14/2010  Office visit  Willard Sorensen MD

 

 10/9/2010  [facility]  Willard Sorensen MD

 

 10/7/2010  Office visit  Willard Sorensen MD

 

 2010  Office visit  Willard Sorensen MD

 

 2010  Office visit  Willard Sorensen MD

 

 2010  Office visit  Willard Sorensen MD

 

 2010  Office visit  Willard Sorensen MD

 

 2010  Office visit  Willard Sorensen MD

 

 6/10/2010  Office visit  Willard Sorensen MD

 

 2010  Office visit  Willard Sorensen MD

 

 2010  Office visit  Willard Sorensen MD

 

 4/15/2010  Office visit  Willard Sorensen MD

## 2018-11-29 NOTE — XMS REPORT
MU2 Ambulatory Summary

 Created on: 2016



Radha Aviles

External Reference #: [id number]

: 1990

Sex: Female



Demographics







 Address  [address]

[place]  [address]

 

 Home Phone  [phone]

 

 Preferred Language  English

 

 Marital Status  Never 

 

 Restoration Affiliation  Unknown

 

 Race  White

 

 Ethnic Group  Not  or 





Author







 Author  Antonella Ventura

 

 [facility]

 

 Address  [address]

[place], [place]  [address]



 

 Phone  [phone]







Care Team Providers







 Care Team Member Name  Role  Phone

 

 Antonella Ventura  PCP  Unavailable







Allergies and Adverse Reactions







 Name  Reaction  Notes

 

 NO KNOWN DRUG ALLERGIES      







Plan of Treatment

Not available.



Medications







  

 

 Name  Start Date  Expiration Date  SIG  Comments

 

 NataFort 60 mg iron-1 mg oral tablet  4/15/2010  3/11/2011  take 1 tablet by 
oral route once daily for 30 days   

 

 Diflucan 150 mg oral tablet  2010  take 1 tablet (150 mg) by 
oral route once  for 1 days   

 

 Premarin 1.25 mg oral tablet  2011  take 1 tablet by oral route 
daily for 10 days   

 

 doxycycline hyclate 100 mg oral tablet  7/15/2011  2011  take 1 tablet (
100 mg) by oral route every 12 hours for 10 days   

 

 ibuprofen 800 mg oral tablet  2012  take 1 tablet by oral route 
3 times a day for 30 days   

 

 Bactrim -160 mg oral tablet  2012  take 1 tablet by oral 
route every 12 hours for 7 days   

 

 Macrobid 100 mg oral capsule  2016  take 1 capsule (100 mg) by 
oral route 2 times per day with food for 7 days   









 Discontinued 

 

 Name  Start Date  Discontinued Date  SIG  Comments

 

 atenolol 25 mg oral tablet     2011  take 1/2 tablet by oral route QD   

 

 Medrol (Antwan) 4 mg oral tablets,dose pack  2012  3/26/2012  take as 
directed   







Problem List







 Description  Status  Onset

 

 Prenatal Care, High Risk Pregnancy  Active  2010







Vital Signs







 Date  Time  BP-Sys(mm[Hg]  BP-Marisa(mm[Hg])  HR(bpm)  RR(rpm)  Temp  WT  HT  HC  
BMI  BSA  BMI Percentile  O2 Sat(%)

 

 2016  11:15:00 AM  130 mmHg  70 mmHg  80 bpm  20 rpm     214 lbs  64 in  
   36.73 kg/m2  2.09 m2      

 

 3/26/2012  10:30:00 AM  126 mmHg  62 mmHg  106 bpm  18 rpm  96.8 F  205 lbs  
64 in     35.1878 kg/m  2.0491 m     97 %

 

 2012  1:22:00 PM  120 mmHg  68 mmHg  66 bpm  18 rpm  97 F  202.375 lbs  
64 in     34.74 kg/m2  2.04 m2      

 

 2012  1:44:00 PM  122 mmHg  64 mmHg  68 bpm  18 rpm  98.2 F  198.125 lbs  
64 in     34.0077 kg/m  2.0145 m      

 

 1/3/2012  9:40:00 AM  103 mmHg  77 mmHg  64 bpm  18 rpm  97.7 F  197.125 lbs  
64 in     33.84 kg/m2  2.01 m2      

 

 2011  9:06:00 AM  118 mmHg  68 mmHg  74 bpm     97.4 F  189 lbs          
        

 

 7/15/2011  10:06:00 AM  126 mmHg  70 mmHg  73 bpm  20 rpm  97.9 F  180.5 lbs  
64 in     30.98 kg/m2  1.92 m2  0 %  99 %

 

 2011  8:56:00 AM  96 mmHg  68 mmHg  66 bpm  20 rpm  97.5 F  177.5 lbs  64 
in     30.4675 kg/m  1.9067 m  0 %  98 %

 

 12/15/2010  10:43:00 AM  104 mmHg  64 mmHg  48 bpm     97.8 F  150 lbs        
          

 

 2010  9:15:00 AM  112 mmHg  72 mmHg  49 bpm     98 F  149 lbs  64 in     
25.5755 kg/m  1.747 m  81.5 %   

 

 2010  8:54:00 AM  117 mmHg  65 mmHg  73 bpm  20 rpm  97.8 F  160.5 lbs   
               

 

 2010  9:42:00 AM  134 mmHg  66 mmHg  66 bpm        161 lbs               
   

 

 4/15/2010  4:02:00 PM  135 mmHg  71 mmHg  77 bpm        162 lbs  64 in     
27.81 kg/m2  1.82 m2  89.9 %   







Social History







 Name  Description  Comments

 

 History of tobacco usage     Quit smoking in January - was smoking 1-2cig/day

 

 denies alcohol use      

 

 Tobacco  Former smoker   







History of Procedures







 Date Ordered  Description  Order Status

 

 2011 12:00 AM  URINE PREGNANCY TEST  Reviewed

 

 2011 12:00 AM  IMMUNIZATION ADMIN  Reviewed

 

 2011 12:00 AM  HPV VACCINE 4 VALENT IM  Reviewed

 

 2016 12:00 AM  CYTOPATH C/V THIN LAYER  Returned

 

 2016 12:00 AM  SPECIMEN HANDLING OFFICE-LAB  Reviewed

 

 2016 12:00 AM  N.GONORRHOEAE DNA AMP PROB  Returned

 

 2016 12:00 AM  CHLAMYDIA CULTURE  Returned

 

 2016 12:00 AM  HIV-1ANTIBODY  Returned

 

 2016 12:00 AM  URINALYSIS AUTO W/SCOPE  Returned

 

 2016 12:00 AM  OBSTETRIC PANEL  Returned

 

 2016 12:00 AM  GLUCOSE TOLERANCE TEST (GTT)  Returned

 

 3/28/2016 12:00 AM  ALPHA-FETOPROTEIN SERUM  Returned

 

 3/28/2016 12:00 AM  CHORIONIC GONADOTROPIN TEST  Returned

 

 3/28/2016 12:00 AM  CHORIONIC GONADOTROPIN ASSAY  Returned

 

 5/3/2016 12:00 AM  OB US >/=14 WKS SNGL FETUS  Returned

 

 2012 12:00 AM  X-RAY EXAM KNEE 4 OR MORE  Returned

 

 4/15/2010 12:00 AM  OBSTETRIC PANEL  Reviewed

 

 4/15/2010 12:00 AM  HIV-1ANTIBODY  Reviewed

 

 4/15/2010 12:00 AM  URINALYSIS NONAUTO W/SCOPE  Reviewed

 

 4/15/2010 12:00 AM  OB US < 14 WKS SINGLE FETUS  Reviewed

 

 2010 12:00 AM  GLUCOSE TEST  Reviewed

 

 2010 12:00 AM  Rhogam  Reviewed

 

 2010 12:00 AM  Type and screen  Reviewed

 

 2010 12:00 AM  COMPLETE CBC W/AUTO DIFF WBC  Reviewed

 

 2010 12:00 AM  COMPLETE CBC W/AUTO DIFF WBC  Reviewed

 

 10/14/2010 12:00 AM  CULTURE OTHR SPECIMN AEROBIC  Reviewed

 

 12/15/2010 12:00 AM  INSERTION IMPLANON, IMPLANTABLE CONTRACEPTIVE CAPSULES  
Reviewed

 

 12/15/2010 12:00 AM  Etonogestrel (contraceptive) implant system, (Implanon)  
Reviewed

 

 7/15/2011 12:00 AM  CYTOPATH TBS C/V MANUAL  Reviewed

 

 7/15/2011 12:00 AM  CHLAMYDIA CULTURE  Reviewed

 

 7/15/2011 12:00 AM  N.GONORRHOEAE DNA AMP PROB  Reviewed

 

 7/15/2011 12:00 AM  URINE PREGNANCY TEST  Reviewed

 

 2011 12:00 AM  IMMUNIZATION ADMIN  Reviewed

 

 2011 12:00 AM  HPV VACCINE 4 VALENT IM  Reviewed







Results Summary







 Data and Description  Results

 

 2010 10:41 AM  RUBELLA 34.0 IU/mLCOLOR YELLOW APPEARANCE HAZY SPEC GRAV 
1.025 pH 7.0 PROTEIN TRACE GLUCOSE NEGATIVE KETONE 15 BILIRUBIN NEGATIVE BLOOD 
NEGATIVE NITRITE NEGATIVE LEUK SCREEN NEGATIVE CASTS/LPF NEGATIVE CRYSTALS 
TRACE AMORPH MUCOUS THRDS FEW BACTERIA NEGATIVE EPITH CELLS FEW SQUAMOUS 
TRICHOMONAS NEGATIVE YEAST NEGATIVE WBC 6.3 RBC 4.57 HGB 13.90 g/dLHCT 40.30 %
MCV 88.0 fLMCH 30.40 pgMCHC 34.50 g/dLMPV 9.20 fLPLT 258 %NEUT 65.10 %%LYMP 
28.30 %#NEUT 4.10 #LYMP 1.80 

 

 2010 3:48 PM  WBC 12.9 RBC 4.02 HGB 12.60 g/dLHCT 37.40 %MCV 93.0 fLMCH 
31.30 pgMCHC 33.70 g/dLRDW CV 14.10 %MPV 9.50 fLPLT 263 %NEUT 69.20 %%LYMP 
22.30 %%MONO 7.60 %%EOS 0.70 %%BASO 0.20 %#NEUT 8.91 #LYMP 2.88 #MONO 0.98 #EOS 
0.09 #BASO 0.03 

 

 10/22/2010 8:21 PM  COLOR YELLOW APPEARANCE CLEAR SPEC GRAV 1.010 pH 6.5 
PROTEIN NEGATIVE GLUCOSE NEGATIVE KETONE 15 BILIRUBIN NEGATIVE BLOOD NEGATIVE 
NITRITE NEGATIVE LEUK SCREEN SMALL CASTS/LPF NEGATIVE CRYSTALS NEGATIVE MUCOUS 
THRDS NEGATIVE BACTERIA 2++ EPITH CELLS 3+++ SQUAMOUS TRICHOMONAS NEGATIVE 
YEAST NEGATIVE 

 

 2010 11:58 PM  COLOR YELLOW APPEARANCE CLEAR SPEC GRAV 1.015 pH 6.5 
PROTEIN NEGATIVE GLUCOSE NEGATIVE KETONE NEGATIVE BILIRUBIN NEGATIVE BLOOD 
NEGATIVE NITRITE NEGATIVE LEUK SCREEN LARGE CASTS/LPF NEGATIVE CRYSTALS 
NEGATIVE MUCOUS THRDS NEGATIVE BACTERIA 2++ EPITH CELLS 2++ SQUAMOUS 
TRICHOMONAS NEGATIVE YEAST NEGATIVE 

 

 11/15/2010 5:30 PM  .0 IU/LURIC ACID 3.7 mg/dLGLUCOSE 66.0 mg/dLSODIUM 
137.0 mmol/LPOTASSIUM 3.10 mmol/LCHLORIDE 105.0 mmol/LCO2 21.0 mmol/LBUN 3.0 mg/
dLCREATININE 0.50 mg/dLSGOT/AST 20.0 IU/LSGPT/ALT 18.0 IU/LALK PHOS 152.0 IU/
LTOTAL PROTEIN 6.20 g/dLALBUMIN 3.40 g/dLTOTAL BILI 0.60 mg/dLCALCIUM 9.10 mg/
dLeGFR >60 mL/min/1.73 m2WBC 16.9 RBC 4.18 HGB 13.30 g/dLHCT 39.70 %MCV 95.0 
fLMCH 31.80 pgMCHC 33.50 g/dLRDW CV 14.0 %MPV 10.10 fLPLT 268 %NEUT 75.70 %%
LYMP 17.50 %%MONO 6.20 %%EOS 0.40 %%BASO 0.20 %#NEUT 12.82 #LYMP 2.97 #MONO 
1.05 #EOS 0.06 #BASO 0.03 

 

 11/15/2010 8:50 PM  PROTEIN UR 14.0 mg/dL

 

 2016 12:21 PM  Pap Smear Collected 

 

 2016 2:00 PM  WBC 9.8 RBC 4.60 HGB 13.60 g/dLHCT 41.40 %MCV 90.0 fLMCH 
29.60 pgMCHC 32.90 g/dLRDW CV 13.80 %MPV 9.70 fLPLT 271 %NEUT 59.80 %%LYMP 33.0 
%%MONO 6.40 %%EOS 0.60 %%BASO 0.20 %#NEUT 5.83 #LYMP 3.22 #MONO 0.62 #EOS 0.06 #
BASO 0.02 HIV AG/AB COMBO 0.14 RPR Non Reactive Rubella Antibodies, IgG 2.88 
Index

 

 2016 2:08 PM  COLOR YELLOW APPEARANCE CLEAR SPEC GRAV <=1.005 pH 6.0 
PROTEIN NEGATIVE GLUCOSE NEGATIVE mg/dLKETONE NEGATIVE BILIRUBIN NEGATIVE BLOOD 
NEGATIVE NITRITE NEGATIVE LEUK SCREEN TRACE CASTS/LPF NEGATIVE /LPFCRYSTALS 
NEGATIVE MUCOUS THRDS NEGATIVE BACTERIA NEGATIVE EPITH CELLS 3+++ SQUAMOUS /
HPFTRICHOMONAS NEGATIVE YEAST NEGATIVE 

 

 3/28/2016 2:18 PM  AFP Value 0.0240 ug/mLAFP MoM 1.02 hCG Value 94821.0 mIU/
mLhCG MoM 1.77 uE3 Value 0.70 ng/mLuE3 MoM 1.16 MARISA Value 207.520 pg/mLDIA MoM 
1.25 OSBR Risk       1 IN [address]308 DSR (Second Trimester) 1IN 4435 DSR (By Age)    
1 IN 1003 T18 Risk Not increased T18 (By Age) 1:3907 







History Of Immunizations







 Name  Date Admin  Mfg Name  Mfg Code  Trade Name  Lot#  Route  Inj  Vis Given  
Vis Pub  CVX

 

 HPV  2011  [employer]  MSD  GARDASIL  045OZ  Intramuscular  Left 
Deltoid  2011  999

 

 HPV  2011  [employer]  MSD  GARDASIL  [id number]  Intramuscular  Left 
Deltoid  2011  62







History of Past Illness







 Name  Date of Onset  Comments

 

 Atrial Septal Defect, Ostium Secundum Type      

 

 Prenatal Care, High Risk Pregnancy  2010   

 

    Apr 15 2010  4:29PM   

 

 Prenatal Care, High Risk Pregnancy  Apr 15 2010  4:13PM   

 

    2010  9:54AM   

 

 Prenatal Care, High Risk Pregnancy  2010 10:10AM   

 

    May 13 2010  9:01AM   

 

    John 10 2010  2:45PM   

 

    2010  4:16PM   

 

 Pregnancy, First Normal  Aug 30 2010  9:14AM   

 

 Pregnancy, High Risk  Sep 13 2010  3:13PM   

 

 Group B Strep Screening,   Oct 14 2010  5:43PM   

 

 Pregnancy, High Risk  Oct 14 2010  5:43PM   

 

 Postoperative Examination Following Surgery  Dec  6 2010  9:15AM   

 

 IMPLANON  Insertion  Dec 15 2010 10:44AM   

 

 General Medical Exam, Child  2011  8:58AM   

 

 Contraceptive Management  2011  8:58AM   

 

 Routine gynecological examination  Jul 15 2011 10:03AM   

 

 Contraceptive Counseling  Jul 15 2011 10:03AM   

 

 Vaginal Discharge  Jul 15 2011 10:03AM   

 

 Irregular Menses  Jul 15 2011 10:03AM   

 

 Gardsil (HPV)  2011  8:54AM   

 

 Irregular Menses  Aug 31 2011  9:07AM   

 

 Family Planning  Aug 31 2011  9:07AM   

 

 Contraceptive Management  Andreas  3 2012  9:43AM   

 

 Local Infection  Andreas  3 2012  9:43AM   

 

 Well Child Examination  2012  1:46PM   

 

 Right knee pain  2012  1:46PM   

 

 Right knee pain  2012  1:24PM   

 

 Abscess  2012  1:24PM   

 

 Right knee pain  Mar 26 2012 10:32AM   

 

 General Medical Exam, Adult  2012  1:46PM   

 

 Pregnancy test confirmed positive  2016 11:37AM   

 

 Obesity  2016 11:37AM   

 

 Normal pregnancy in multigravida in second trimester  Mar 28 2016  2:01PM   

 

 History of atrial septal defect repair  May 11 2016  8:50AM   







Payers







 Insurance Name  Company Name  Plan Name  Plan Number  Policy Number  Policy 
Group Number  Start Date

 

    [facility]  
   [id number]     N/A

 

    [place] [employer]  [place] [employer]     
[id number]     N/A

 

    [id number] Medicare A  Test Medicare A     void     N/A







History of Encounters







 Visit Date  Visit Type  Provider

 

 2016  Office visit  Dr. Antonella Ventura MD

 

 2016  Office visit  Dr. Antonella Ventura MD

 

 3/28/2016  Office visit  Dr. Antonella Ventura MD

 

 2016  Office visit  Dr. Antonella Ventura MD

 

 2016  Office visit  Dr. Antonella Ventura MD

 

 2016  Office visit  Velma Mendieta APRN

 

 3/26/2012  Office visit  Ivanna Swan APRN

 

 2012  Office visit  Ivanna Swan APRN

 

 2012  Office visit  Ivanna Swan APRN

 

 1/3/2012  Office visit  Ivanna Swan APRN

 

 2011  Nurse visit  Ivanna Swan APRN

 

 2011  Nurse visit  Ivanna Swan APRN

 

 7/15/2011  Office visit  Ivanna Swan APRN

 

 2011  Office visit  Ivanna Swan APRN

 

 12/15/2010  Office visit  Willard Sorensen MD

 

 2010  Surgery  Willard Sorensen MD

 

 2010  [facility]  Willard Sorensen MD

 

 11/15/2010  Office visit  Willard Sorensen MD

 

 11/15/2010  [facility]  Willard Sorensen MD

 

 2010  Office visit  Willard Sorensen MD

 

 2010  Office visit  Willard Sorensen MD

 

 10/28/2010  Office visit  Willard Sorensen MD

 

 10/21/2010  Office visit  Willard Sorensen MD

 

 10/14/2010  Office visit  Willard Sorensen MD

 

 10/9/2010  [facility]  Willard Sorensen MD

 

 10/7/2010  Office visit  Willard Sorensen MD

 

 2010  Office visit  Willard Sorensen MD

 

 2010  Office visit  Willard Sorensen MD

 

 2010  Office visit  Willard Sorensen MD

 

 2010  Office visit  Willard Sorensen MD

 

 2010  Office visit  Willard Sorensen MD

 

 6/10/2010  Office visit  Willard Sorensen MD

 

 2010  Office visit  Willard Sorensen MD

 

 2010  Office visit  Willard Sorensen MD

 

 4/15/2010  Office visit  Willard Sorensen MD

## 2018-11-29 NOTE — XMS REPORT
Continuity of Care Document

 Created on: 2016



RADHA AVILES

External Reference #: [id number]

: 1990

Sex: Female



Demographics







 Address  [address]

[place]  [address]

 

 Home Phone  [phone]

 

 Preferred Language  Unknown

 

 Marital Status  

 

 Hoahaoism Affiliation  Unknown

 

 Race  White

 

 Ethnic Group  Unknown





Author







 Author  [facility]

 

 Organization  [facility]

 

 Address  [facility]

 [address]

[place]  [address]



 

 Phone  Unavailable







Support







 Name  Relationship  Address  Phone

 

 FERNANDEZ VICTOR D.O.  Caregiver  [address]

[address] [place] 

[place]  [address] [phone]

 

 DOE AVILES  Next Of Kin  [address]

[place]  [address] [phone]







Insurance Providers







 Payer Name  Policy Number  Subscriber Name  Relationship

 

 Self Pay Insurance     Radha Aviles  18 Self / Same As Patient







Advance Directives







 Directive  Response  Recorded Date/Time

 

 Advance Directives  No  02/20/15 7:18am

 

 Living Will  No  02/20/15 7:18am

 

 Health Care Proxy  No  01/10/16 1:05pm

 

 Power of  for Health Care  No  02/20/15 7:18am

 

 Who is designated as your agent/rep. to act on your behalf?  PT  02/22/15 10:
14am

 

 Organ, Tissue, or Eye Donor  No  02/20/15 7:17am

 

 Do you have a signed organ donor card?  No  02/20/15 7:17am







Chief Complaint and Reason for Visit







 Chief Complaint  NAUSEA & VOMITING

 

 Reason for Visit  Pregnancy

[id number]







Problems

Active Problems







 Medical Problem  Onset Date  Status

 

 Nausea and vomiting during pregnancy  Unknown  Acute

 

 Pregnancy  Unknown  Acute

 

 Renal stone  Unknown  Acute

 

 Spontaneous   Unknown  Acute

 

 Spontaneous   Unknown  Acute

 

 Spontaneous   Unknown  Acute

 

 UTI (lower urinary tract infection)  Unknown  Acute







Medications

Current Home Medications







 Medication  Dose  Units  Route  Directions  Days/Qty  Instructions  Start Date

 

 Folic Acid 1 Mg  1  Mg  Oral  Daily        02/20/15

 

 Pnv With Ca,No.74/Iron/Fa 1 Each  1  Each  Oral           02/20/15

 

 Cephalexin Monohydrate 500 Mg  1,000  Mg  Oral  Twice A Day  40     02/20/15

 

 Acetaminophen/ Codeine #3 Tab* 1 Tab  1  Ea  Oral  Every 6 Hrs As Needed For 
Pain for Pain  15     02/22/15

 

 Acetaminophen/Hydrocodone Bitart (Lortab 5-325*) 1 Tab  1  Each  Oral  Every 4-
6 Hrs As Needed Pain as needed for Pain  20     07/04/15

 

 Tamsulosin Hcl 0.4 Mg  0.4  Mg  Oral  Daily  20     07/04/15

 

 Ondansetron* 4 Mg/Tab  4  Mg  Oral  Every 4-6 Hours As Needed as needed for 
Nausea  30     07/04/15







Social History







 Social History Problem  Response  Recorded Date/Time

 

 Smoking Status  Never smoker  2015 8:06am









 Query  Response  Start Date  Stop Date

 

 Smoking Status  Never smoker      







Hospital Discharge Instructions

No hospital discharge instructions.



Plan of Care







 Discharge Date  01/10/16 3:38pm

 

 Disposition  01 HOME[facility]

 

 Condition at Discharge  Stable

 

 Instructions/Education Provided  Pregnancy (GEN)

 

 Prescriptions  See Medication Section

 

 Referrals  GEORGES ROMANO D.O. - 







Functional Status







 Query  Response  Date Recorded

 

 Patient Behavior  Appropriate

  January 10, 2016 1:16pm







Allergies, Adverse Reactions, Alerts

No known allergies.



Immunizations







 Name  Given  Type

 

 Hx Diphtheria, Pertussis, Tetanus Vaccination  Unknown  Historical

 

 Hx Influenza Vaccination  No  Historical

 

 Hx Pneumococcal Vaccination  No  Historical







Vital Signs

Acute Vital Signs





 Vital  Response  Date/Time

 

 Temperature (Fahrenheit)  97.7 degrees F (97.6 - 99.5)  01/10/2016 1:16pm

 

 Temperature Source  Temporal Artery  01/10/2016 1:16pm

 

 Pulse Rate (adult)  80 bpm (60 - 90)  01/10/2016 3:00pm

 

 Respiratory Rate  18 bpm (12 - 24)  01/10/2016 3:00pm

 

 Blood Pressure  128/72 mm Hg  01/10/2016 3:00pm

 

 O2 Sat by Pulse Oximetry  99 % (90 - 100)  01/10/2016 3:00pm

 

 Oxygen Delivery Method     01/10/2016 3:00pm

 

 Height  5 ft 3 in   

 

 Weight  180 lb   

 

 Body Mass Index  32.0 kg/m^2   







Results

Laboratory Results







 Test Name  Result  Units  Flags  Reference  Collection Date/Time  Result Date/
Time  Comments

 

 White Blood Count  9.8  K/uL     4.8-10.8  01/10/2016 2:00pm  01/10/2016 2:
26pm   

 

 Red Blood Count  4.87  M/uL     4.20-5.40  01/10/2016 2:00pm  01/10/2016 2:
26pm   

 

 Hemoglobin  14.2  gm/dL     12.0-16.0  01/10/2016 2:00pm  01/10/2016 2:26pm   

 

 Hematocrit  42.5  %     37.0-47.0  01/10/2016 2:00pm  01/10/2016 2:26pm   

 

 Mean Corpuscular Volume  87.4  fL     81.0-99.0  01/10/2016 2:00pm  01/10/2016 
2:26pm   

 

 Mean Corpuscular Hemoglobin  29.2  pg     27.0-31.0  01/10/2016 2:00pm  01/10/
2016 2:26pm   

 

 Mean Corpuscular Hemoglobin Concent  33.4  g/dL     30.0-37.0  01/10/2016 2:
00pm  01/10/2016 2:26pm   

 

 Red Cell Distribution Width  13.5  %     11.5-14.5  01/10/2016 2:00pm  01/10/
2016 2:26pm   

 

 Platelet Count  318  K/uL     130-400  01/10/2016 2:00pm  01/10/2016 2:26pm   

 

 Mean Platelet Volume  7.2  fL   L  7.4-10.4  01/10/2016 2:00pm  01/10/2016 2:
26pm   

 

 Neutrophils (%) (Auto)  65.4  %     42.2-75.2  01/10/2016 2:00pm  01/10/2016 2:
26pm   

 

 Lymphocytes (%) (Auto)  28.4  %     20.5-51.1  01/10/2016 2:00pm  01/10/2016 2:
26pm   

 

 Monocytes (%) (Auto)  4.6  %     1.7-9.3  01/10/2016 2:00pm  01/10/2016 2:26pm
   

 

 Eosinophils (%) (Auto)  1.4  %     0-3  01/10/2016 2:00pm  01/10/2016 2:26pm   

 

 Basophils (%) (Auto)  0.1  %     0.0-1.0  01/10/2016 2:00pm  01/10/2016 2:26pm
   

 

 Neutrophils # (Auto)  6.4  K/uL     2.0-6.9  01/10/2016 2:00pm  01/10/2016 2:
26pm   

 

 Lymphocytes # (Auto)  2.8  K/uL     1.2-3.4  01/10/2016 2:00pm  01/10/2016 2:
26pm   

 

 Monocytes # (Auto)  0.5  K/uL     0.1-0.6  01/10/2016 2:00pm  01/10/2016 2:
26pm   

 

 Eosinophils # (Auto)  0.1  K/uL     0.0-0.7  01/10/2016 2:00pm  01/10/2016 2:
26pm   

 

 Basophils # (Auto)  0.0  K/uL     0.0-0.2  01/10/2016 2:00pm  01/10/2016 2:
26pm   

 

 Random Glucose  77  mg/dL       01/10/2016 2:00pm  01/10/2016 2:24pm   

 

 Blood Urea Nitrogen  9  mg/dL     7-18  01/10/2016 2:00pm  01/10/2016 2:24pm   

 

 Creatinine  0.7  mg/dL     0.55-1.02  01/10/2016 2:00pm  01/10/2016 2:24pm   

 

 Sodium Level  140  mEq/L     136-145  01/10/2016 2:00pm  01/10/2016 2:24pm   

 

 Potassium Level  3.9  mEq/L     3.5-5.0  01/10/2016 2:00pm  01/10/2016 2:24pm 
  

 

 Chloride Level  105  mEq/L       01/10/2016 2:00pm  01/10/2016 2:24pm   

 

 Carbon Dioxide Level  27.4  mEq/L     21-32  01/10/2016 2:00pm  01/10/2016 2:
24pm   

 

 Calcium Level  8.3  mg/dL   L  8.8-10.5  01/10/2016 2:00pm  01/10/2016 2:24pm 
  

 

 Total Protein  7.4  gm/dL     6.4-8.2  01/10/2016 2:00pm  01/10/2016 2:24pm   

 

 Albumin  3.6  gm/dL     3.4-5.0  01/10/2016 2:00pm  01/10/2016 2:24pm   

 

 Total Bilirubin  0.50  mg/dL     0.00-1.00  01/10/2016 2:00pm  01/10/2016 2:
24pm   

 

 Aspartate Amino Transf (AST/SGOT)  13  U/L   L  15-37  01/10/2016 2:00pm  01/10
/2016 2:24pm   

 

 Alanine Aminotransferase (ALT/SGPT)  18  U/L     12-78  01/10/2016 2:00pm  01/
10/2016 2:24pm   

 

 Total Alkaline Phosphatase  74  U/L       01/10/2016 2:00pm  01/10/2016 2
:24pm   

 

 Urine Color  YELLOW        YELLOW  01/10/2016 2:00pm  01/10/2016 2:24pm   

 

 Urine Appearance  SL CLOUDY      H  CLEAR  01/10/2016 2:00pm  01/10/2016 2:
24pm   

 

 Urine Glucose (UA)  NEGATIVE  mg/dL     NEGATIVE  01/10/2016 2:00pm  01/10/
2016 2:24pm   

 

 Urine Bilirubin  NEGATIVE        NEGATIVE  01/10/2016 2:00pm  01/10/2016 2:
24pm   

 

 Urine Ketones  NEGATIVE  mg/dL     NEGATIVE  01/10/2016 2:00pm  01/10/2016 2:
24pm   

 

 Urine Specific Gravity  >=1.030      H  1.010-1.025  01/10/2016 2:00pm  01/10/
2016 2:24pm   

 

 Urine Occult Blood  NEGATIVE        NEGATIVE  01/10/2016 2:00pm  01/10/2016 2:
24pm   

 

 Urine pH  6.0        5.0-8.0  01/10/2016 2:00pm  01/10/2016 2:24pm   

 

 Urine Protein  NEGATIVE  mg/dL     NEGATIVE  01/10/2016 2:00pm  01/10/2016 2:
24pm   

 

 Urine Urobilinogen  0.2 mg/dL  E.U./dL     0.2-1.0  01/10/2016 2:00pm  01/10/
2016 2:24pm   

 

 Urine Nitrate  POSITIVE      H  NEGATIVE  01/10/2016 2:00pm  01/10/2016 2:24pm
   

 

 Urine Leukocyte Esterase  1+ (Small)      H  NEGATIVE  01/10/2016 2:00pm  01/10
/2016 2:24pm   

 

 Urine RBC  3-4  /hpf   H  0  01/10/2016 2:00pm  01/10/2016 2:24pm   

 

 Urine WBC  1-2  /hpf     0-4  01/10/2016 2:00pm  01/10/2016 2:24pm   

 

 Urine Squamous Epithelial Cells  10-15  /hpf     0-1  01/10/2016 2:00pm  01/10/
2016 2:24pm   

 

 Urine Bacteria  3+      H  NEGATIVE  01/10/2016 2:00pm  01/10/2016 2:24pm   

 

 Urine HCG, Qualitative  POSITIVE        NEG  01/10/2016 2:00pm  01/10/2016 2:
24pm   

 

 Glomerular Filtration Rate Calc  109.3  mL/min        01/10/2016 2:00pm  01/10/
2016 2:24pm   







Procedures

No known history of procedures.



Encounters







 Encounter  Location  Arrival/Admit Date  Discharge/Depart Date  Attending 
Provider

 

 Departed Emergency Room  [place]  01/10/16 1:06pm  01/10/16 3:38pm  
FERNANDEZ VICTOR D.O.











 Recent Diagnosis

## 2018-11-29 NOTE — XMS REPORT
MU2 Ambulatory Summary

 Created on: 2016



Radha Aviles

External Reference #: [id number]

: 1990

Sex: Female



Demographics







 Address  [address] [place] [address] [address]

[place]  [address]

 

 Home Phone  [phone]

 

 Preferred Language  English

 

 Marital Status  Never 

 

 Advent Affiliation  Unknown

 

 Race  White

 

 Ethnic Group  Not  or 





Author







 Author  Antonella Ventura  [facility]

 

 Address  [address]

[place], [place]  [id number]



 

 Phone  [phone]







Care Team Providers







 Care Team Member Name  Role  Phone

 

 Antonella Ventura  PCP  Unavailable







Allergies and Adverse Reactions







 Name  Reaction  Notes

 

 NO KNOWN DRUG ALLERGIES      







Plan of Treatment







 Planned Activity  Comments  Planned Date  Planned Time  Plan/Goal

 

 ALPHA-FETOPROTEIN SERUM     3/28/2016  12:00 AM   

 

 CHORIONIC GONADOTROPIN TEST     3/28/2016  12:00 AM   

 

 CHORIONIC GONADOTROPIN ASSAY     3/28/2016  12:00 AM   

 

 OB US >/=14 WKS SNGL FETUS     5/3/2016  12:00 AM   







Medications







  

 

 Name  Start Date  Expiration Date  SIG  Comments

 

 [place] 60 mg iron-1 mg oral tablet  4/15/2010  3/11/2011  take 1 tablet by 
oral route once daily for 30 days   

 

 Diflucan 150 mg oral tablet  2010  take 1 tablet (150 mg) by 
oral route once  for 1 days   

 

 Premarin 1.25 mg oral tablet  2011  take 1 tablet by oral route 
daily for 10 days   

 

 doxycycline hyclate 100 mg oral tablet  7/15/2011  2011  take 1 tablet (
100 mg) by oral route every 12 hours for 10 days   

 

 ibuprofen 800 mg oral tablet  2012  take 1 tablet by oral route 
3 times a day for 30 days   

 

 Bactrim -160 mg oral tablet  2012  take 1 tablet by oral 
route every 12 hours for 7 days   

 

 Macrobid 100 mg oral capsule  2016  take 1 capsule (100 mg) by 
oral route 2 times per day with food for 7 days   









 Discontinued 

 

 Name  Start Date  Discontinued Date  SIG  Comments

 

 atenolol 25 mg oral tablet     2011  take 1/2 tablet by oral route QD   

 

 Medrol (Antwan) 4 mg oral tablets,dose pack  2012  3/26/2012  take as 
directed   







Problem List







 Description  Status  Onset

 

 Prenatal Care, High Risk Pregnancy  Active  2010







Vital Signs







 Date  Time  BP-Sys(mm[Hg]  BP-Marisa(mm[Hg])  HR(bpm)  RR(rpm)  Temp  WT  HT  HC  
BMI  BSA  BMI Percentile  O2 Sat(%)

 

 2016  11:15:00 AM  130 mmHg  70 mmHg  80 bpm  20 rpm     214 lbs  64 in  
   36.73 kg/m2  2.09 m2      

 

 3/26/2012  10:30:00 AM  126 mmHg  62 mmHg  106 bpm  18 rpm  96.8 F  205 lbs  
64 in     35.1878 kg/m  2.0491 m     97 %

 

 2012  1:22:00 PM  120 mmHg  68 mmHg  66 bpm  18 rpm  97 F  202.375 lbs  
64 in     34.74 kg/m2  2.04 m2      

 

 2012  1:44:00 PM  122 mmHg  64 mmHg  68 bpm  18 rpm  98.2 F  198.125 lbs  
64 in     34.0077 kg/m  2.0145 m      

 

 1/3/2012  9:40:00 AM  103 mmHg  77 mmHg  64 bpm  18 rpm  97.7 F  197.125 lbs  
64 in     33.84 kg/m2  2.01 m2      

 

 2011  9:06:00 AM  118 mmHg  68 mmHg  74 bpm     97.4 F  189 lbs          
        

 

 7/15/2011  10:06:00 AM  126 mmHg  70 mmHg  73 bpm  20 rpm  97.9 F  180.5 lbs  
64 in     30.98 kg/m2  1.92 m2  0 %  99 %

 

 2011  8:56:00 AM  96 mmHg  68 mmHg  66 bpm  20 rpm  97.5 F  177.5 lbs  64 
in     30.4675 kg/m  1.9067 m  0 %  98 %

 

 12/15/2010  10:43:00 AM  104 mmHg  64 mmHg  48 bpm     97.8 F  150 lbs        
          

 

 2010  9:15:00 AM  112 mmHg  72 mmHg  49 bpm     98 F  149 lbs  64 in     
25.5755 kg/m  1.747 m  81.5 %   

 

 2010  8:54:00 AM  117 mmHg  65 mmHg  73 bpm  20 rpm  97.8 F  160.5 lbs   
               

 

 2010  9:42:00 AM  134 mmHg  66 mmHg  66 bpm        161 lbs               
   

 

 4/15/2010  4:02:00 PM  135 mmHg  71 mmHg  77 bpm        162 lbs  64 in     
27.81 kg/m2  1.82 m2  89.9 %   







Social History







 Name  Description  Comments

 

 History of tobacco usage     Quit smoking in January - was smoking 1-2cig/day

 

 denies alcohol use      

 

 Tobacco  Former smoker   







History of Procedures







 Date Ordered  Description  Order Status

 

 2011 12:00 AM  URINE PREGNANCY TEST  Reviewed

 

 2011 12:00 AM  IMMUNIZATION ADMIN  Reviewed

 

 2011 12:00 AM  HPV VACCINE 4 VALENT IM  Reviewed

 

 2016 12:00 AM  CYTOPATH C/V THIN LAYER  Returned

 

 2016 12:00 AM  SPECIMEN HANDLING OFFICE-LAB  Reviewed

 

 2016 12:00 AM  N.GONORRHOEAE DNA AMP PROB  Returned

 

 2016 12:00 AM  CHLAMYDIA CULTURE  Returned

 

 2016 12:00 AM  HIV-1ANTIBODY  Returned

 

 2016 12:00 AM  URINALYSIS AUTO W/SCOPE  Returned

 

 2016 12:00 AM  OBSTETRIC PANEL  Returned

 

 2016 12:00 AM  GLUCOSE TOLERANCE TEST (GTT)  Returned

 

 2012 12:00 AM  X-RAY EXAM KNEE 4 OR MORE  Returned

 

 4/15/2010 12:00 AM  OBSTETRIC PANEL  Reviewed

 

 4/15/2010 12:00 AM  HIV-1ANTIBODY  Reviewed

 

 4/15/2010 12:00 AM  URINALYSIS NONAUTO W/SCOPE  Reviewed

 

 4/15/2010 12:00 AM  OB US < 14 WKS SINGLE FETUS  Reviewed

 

 2010 12:00 AM  GLUCOSE TEST  Reviewed

 

 2010 12:00 AM  Rhogam  Reviewed

 

 2010 12:00 AM  Type and screen  Reviewed

 

 2010 12:00 AM  COMPLETE CBC W/AUTO DIFF WBC  Reviewed

 

 2010 12:00 AM  COMPLETE CBC W/AUTO DIFF WBC  Reviewed

 

 10/14/2010 12:00 AM  CULTURE OTHR SPECIMN AEROBIC  Reviewed

 

 12/15/2010 12:00 AM  INSERTION IMPLANON, IMPLANTABLE CONTRACEPTIVE CAPSULES  
Reviewed

 

 12/15/2010 12:00 AM  Etonogestrel (contraceptive) implant system, (Implanon)  
Reviewed

 

 7/15/2011 12:00 AM  CYTOPATH TBS C/V MANUAL  Reviewed

 

 7/15/2011 12:00 AM  CHLAMYDIA CULTURE  Reviewed

 

 7/15/2011 12:00 AM  N.GONORRHOEAE DNA AMP PROB  Reviewed

 

 7/15/2011 12:00 AM  URINE PREGNANCY TEST  Reviewed

 

 2011 12:00 AM  IMMUNIZATION ADMIN  Reviewed

 

 2011 12:00 AM  HPV VACCINE 4 VALENT IM  Reviewed







Results Summary







 Data and Description  Results

 

 2010 10:41 AM  RUBELLA 34.0 IU/mLCOLOR YELLOW APPEARANCE HAZY SPEC GRAV 
1.025 pH 7.0 PROTEIN TRACE GLUCOSE NEGATIVE KETONE 15 BILIRUBIN NEGATIVE BLOOD 
NEGATIVE NITRITE NEGATIVE LEUK SCREEN NEGATIVE CASTS/LPF NEGATIVE CRYSTALS 
TRACE AMORPH MUCOUS THRDS FEW BACTERIA NEGATIVE EPITH CELLS FEW SQUAMOUS 
TRICHOMONAS NEGATIVE YEAST NEGATIVE WBC 6.3 RBC 4.57 HGB 13.90 g/dLHCT 40.30 %
MCV 88.0 fLMCH 30.40 pgMCHC 34.50 g/dLMPV 9.20 fLPLT 258 %NEUT 65.10 %%LYMP 
28.30 %#NEUT 4.10 #LYMP 1.80 

 

 2010 3:48 PM  WBC 12.9 RBC 4.02 HGB 12.60 g/dLHCT 37.40 %MCV 93.0 fLMCH 
31.30 pgMCHC 33.70 g/dLRDW CV 14.10 %MPV 9.50 fLPLT 263 %NEUT 69.20 %%LYMP 
22.30 %%MONO 7.60 %%EOS 0.70 %%BASO 0.20 %#NEUT 8.91 #LYMP 2.88 #MONO 0.98 #EOS 
0.09 #BASO 0.03 

 

 10/22/2010 8:21 PM  COLOR YELLOW APPEARANCE CLEAR SPEC GRAV 1.010 pH 6.5 
PROTEIN NEGATIVE GLUCOSE NEGATIVE KETONE 15 BILIRUBIN NEGATIVE BLOOD NEGATIVE 
NITRITE NEGATIVE LEUK SCREEN SMALL CASTS/LPF NEGATIVE CRYSTALS NEGATIVE MUCOUS 
THRDS NEGATIVE BACTERIA 2++ EPITH CELLS 3+++ SQUAMOUS TRICHOMONAS NEGATIVE 
YEAST NEGATIVE 

 

 2010 11:58 PM  COLOR YELLOW APPEARANCE CLEAR SPEC GRAV 1.015 pH 6.5 
PROTEIN NEGATIVE GLUCOSE NEGATIVE KETONE NEGATIVE BILIRUBIN NEGATIVE BLOOD 
NEGATIVE NITRITE NEGATIVE LEUK SCREEN LARGE CASTS/LPF NEGATIVE CRYSTALS 
NEGATIVE MUCOUS THRDS NEGATIVE BACTERIA 2++ EPITH CELLS 2++ SQUAMOUS 
TRICHOMONAS NEGATIVE YEAST NEGATIVE 

 

 11/15/2010 5:30 PM  .0 IU/LURIC ACID 3.7 mg/dLGLUCOSE 66.0 mg/dLSODIUM 
137.0 mmol/LPOTASSIUM 3.10 mmol/LCHLORIDE 105.0 mmol/LCO2 21.0 mmol/LBUN 3.0 mg/
dLCREATININE 0.50 mg/dLSGOT/AST 20.0 IU/LSGPT/ALT 18.0 IU/LALK PHOS 152.0 IU/
LTOTAL PROTEIN 6.20 g/dLALBUMIN 3.40 g/dLTOTAL BILI 0.60 mg/dLCALCIUM 9.10 mg/
dLeGFR >60 mL/min/1.73 m2WBC 16.9 RBC 4.18 HGB 13.30 g/dLHCT 39.70 %MCV 95.0 
fLMCH 31.80 pgMCHC 33.50 g/dLRDW CV 14.0 %MPV 10.10 fLPLT 268 %NEUT 75.70 %%
LYMP 17.50 %%MONO 6.20 %%EOS 0.40 %%BASO 0.20 %#NEUT 12.82 #LYMP 2.97 #MONO 
1.05 #EOS 0.06 #BASO 0.03 

 

 11/15/2010 8:50 PM  PROTEIN UR 14.0 mg/dL

 

 2016 12:21 PM  Pap Smear Collected 

 

 2016 2:00 PM  WBC 9.8 RBC 4.60 HGB 13.60 g/dLHCT 41.40 %MCV 90.0 fLMCH 
29.60 pgMCHC 32.90 g/dLRDW CV 13.80 %MPV 9.70 fLPLT 271 %NEUT 59.80 %%LYMP 33.0 
%%MONO 6.40 %%EOS 0.60 %%BASO 0.20 %#NEUT 5.83 #LYMP 3.22 #MONO 0.62 #EOS 0.06 #
BASO 0.02 HIV AG/AB COMBO 0.14 RPR Non Reactive Rubella Antibodies, IgG 2.88 
Index

 

 2016 2:08 PM  COLOR YELLOW APPEARANCE CLEAR SPEC GRAV <=1.005 pH 6.0 
PROTEIN NEGATIVE GLUCOSE NEGATIVE mg/dLKETONE NEGATIVE BILIRUBIN NEGATIVE BLOOD 
NEGATIVE NITRITE NEGATIVE LEUK SCREEN TRACE CASTS/LPF NEGATIVE /LPFCRYSTALS 
NEGATIVE MUCOUS THRDS NEGATIVE BACTERIA NEGATIVE EPITH CELLS 3+++ SQUAMOUS /
HPFTRICHOMONAS NEGATIVE YEAST NEGATIVE 







History Of Immunizations







 Name  Date Admin  Mfg Name  Mfg Code  Trade Name  Lot#  Route  Inj  Vis Given  
Vis Pub  CVX

 

 HPV  2011  [employer]  MSD  GARDASIL  [id number]  Intramuscular  Left 
Deltoid  2011  999

 

 HPV  2011  [employer]  MSD  GARDASIL  [id number]  Intramuscular  Left 
Deltoid  2011  62







History of Past Illness







 Name  Date of Onset  Comments

 

 Atrial Septal Defect, Ostium Secundum Type      

 

 Prenatal Care, High Risk Pregnancy  2010   

 

    Apr 15 2010  4:29PM   

 

 Prenatal Care, High Risk Pregnancy  Apr 15 2010  4:13PM   

 

    2010  9:54AM   

 

 Prenatal Care, High Risk Pregnancy  2010 10:10AM   

 

    May 13 2010  9:01AM   

 

    John 10 2010  2:45PM   

 

    2010  4:16PM   

 

 Pregnancy, First Normal  Aug 30 2010  9:14AM   

 

 Pregnancy, High Risk  Sep 13 2010  3:13PM   

 

 Group B Strep Screening,   Oct 14 2010  5:43PM   

 

 Pregnancy, High Risk  Oct 14 2010  5:43PM   

 

 Postoperative Examination Following Surgery  Dec  6 2010  9:15AM   

 

 IMPLANON  Insertion  Dec 15 2010 10:44AM   

 

 General Medical Exam, Child  2011  8:58AM   

 

 Contraceptive Management  2011  8:58AM   

 

 Routine gynecological examination  Jul 15 2011 10:03AM   

 

 Contraceptive Counseling  Jul 15 2011 10:03AM   

 

 Vaginal Discharge  Jul 15 2011 10:03AM   

 

 Irregular Menses  Jul 15 2011 10:03AM   

 

 Gardsil (HPV)  2011  8:54AM   

 

 Irregular Menses  Aug 31 2011  9:07AM   

 

 Family Planning  Aug 31 2011  9:07AM   

 

 Contraceptive Management  Andreas  3 2012  9:43AM   

 

 Local Infection  Andreas  3 2012  9:43AM   

 

 Well Child Examination  2012  1:46PM   

 

 Right knee pain  2012  1:46PM   

 

 Right knee pain  2012  1:24PM   

 

 Abscess  2012  1:24PM   

 

 Right knee pain  Mar 26 2012 10:32AM   

 

 General Medical Exam, Adult  2012  1:46PM   

 

 Pregnancy test confirmed positive  2016 11:37AM   

 

 Obesity  2016 11:37AM   

 

 Normal pregnancy in multigravida in second trimester  Mar 28 2016  2:01PM   







Payers







 Insurance Name  Company Name  Plan Name  Plan Number  Policy Number  Policy 
Group Number  Start Date

 

    [facility]  
   [id number]     N/A

 

    [employer] [employer]  [employer]     
[id number]     N/A

 

    [id number] Medicare A  Test Medicare A     void     N/A







History of Encounters







 Visit Date  Visit Type  Provider

 

 3/28/2016  Office visit  Dr. Antonella Ventura MD

 

 2016  Office visit  Dr. Antonella Ventura MD

 

 2016  Office visit  Dr. Antonella Ventura MD

 

 2016  Office visit  Velma Mendieta APRN

 

 3/26/2012  Office visit  Ivanna Swan APRN

 

 2012  Office visit  Ivanna Swan APRN

 

 2012  Office visit  Ivanna Swan APRN

 

 1/3/2012  Office visit  Ivanna Swan APRN

 

 2011  Nurse visit  Ivanna Swan APRN

 

 2011  Nurse visit  Ivanna Swan APRN

 

 7/15/2011  Office visit  Ivanna Swan APRN

 

 2011  Office visit  Ivanna Swan APRN

 

 12/15/2010  Office visit  Willard Sorensen MD

 

 2010  Surgery  Willard Sorensen MD

 

 2010  [facility]  Willard Sorensen MD

 

 11/15/2010  Office visit  Willard Sorensen MD

 

 11/15/2010  [facility]  Willard Sorensen MD

 

 2010  Office visit  Willard Sorensen MD

 

 2010  Office visit  Willard Sorensen MD

 

 10/28/2010  Office visit  Willard Sorensen MD

 

 10/21/2010  Office visit  Willard Sorensen MD

 

 10/14/2010  Office visit  Willard Sorensen MD

 

 10/9/2010  [facility]  Willard Sorensen MD

 

 10/7/2010  Office visit  Willard Sorensen MD

 

 2010  Office visit  Willard Sorensen MD

 

 2010  Office visit  Willard Sorensen MD

 

 2010  Office visit  Willard Sorensen MD

 

 2010  Office visit  Willard Sorensen MD

 

 2010  Office visit  Willard Sorensen MD

 

 6/10/2010  Office visit  Willard Sorensen MD

 

 2010  Office visit  Willard Sorensen MD

 

 2010  Office visit  Willard Sorensen MD

 

 4/15/2010  Office visit  Willard Sorensen MD

## 2018-11-29 NOTE — XMS REPORT
MU2 Ambulatory Summary

 Created on: 2016



Radha Avlies

External Reference #: [id number]

: 1990

Sex: Female



Demographics







 Address  [address]

[place]  [address]

 

 Home Phone  [phone]

 

 Preferred Language  English

 

 Marital Status  Never 

 

 Christianity Affiliation  Unknown

 

 Race  White

 

 Ethnic Group  Not  or 





Author







 Author  Antonella Ventura

 

 [facility]

 

 Address  [address]

[place], [place]  [id number]



 

 Phone  [phone]







Care Team Providers







 Care Team Member Name  Role  Phone

 

 Antonella Ventura  PCP  Unavailable







Allergies and Adverse Reactions







 Name  Reaction  Notes

 

 NO KNOWN DRUG ALLERGIES      







Plan of Treatment

Not available.



Medications







  

 

 Name  Start Date  Expiration Date  SIG  Comments

 

 N[place] 60 mg iron-1 mg oral tablet  4/15/2010  3/11/2011  take 1 tablet by 
oral route once daily for 30 days   

 

 Diflucan 150 mg oral tablet  2010  take 1 tablet (150 mg) by 
oral route once  for 1 days   

 

 Premarin 1.25 mg oral tablet  2011  take 1 tablet by oral route 
daily for 10 days   

 

 doxycycline hyclate 100 mg oral tablet  7/15/2011  2011  take 1 tablet (
100 mg) by oral route every 12 hours for 10 days   

 

 ibuprofen 800 mg oral tablet  2012  take 1 tablet by oral route 
3 times a day for 30 days   

 

 Bactrim -160 mg oral tablet  2012  take 1 tablet by oral 
route every 12 hours for 7 days   

 

 Macrobid 100 mg oral capsule  2016  take 1 capsule (100 mg) by 
oral route 2 times per day with food for 7 days   









 Discontinued 

 

 Name  Start Date  Discontinued Date  SIG  Comments

 

 atenolol 25 mg oral tablet     2011  take 1/2 tablet by oral route QD   

 

 Medrol (Antwan) 4 mg oral tablets,dose pack  2012  3/26/2012  take as 
directed   







Problem List







 Description  Status  Onset

 

 Prenatal Care, High Risk Pregnancy  Active  2010







Vital Signs







 Date  Time  BP-Sys(mm[Hg]  BP-Marisa(mm[Hg])  HR(bpm)  RR(rpm)  Temp  WT  HT  HC  
BMI  BSA  BMI Percentile  O2 Sat(%)

 

 2016  11:15:00 AM  130 mmHg  70 mmHg  80 bpm  20 rpm     214 lbs  64 in  
   36.73 kg/m2  2.09 m2      

 

 3/26/2012  10:30:00 AM  126 mmHg  62 mmHg  106 bpm  18 rpm  96.8 F  205 lbs  
64 in     35.1878 kg/m  2.0491 m     97 %

 

 2012  1:22:00 PM  120 mmHg  68 mmHg  66 bpm  18 rpm  97 F  202.375 lbs  
64 in     34.74 kg/m2  2.04 m2      

 

 2012  1:44:00 PM  122 mmHg  64 mmHg  68 bpm  18 rpm  98.2 F  198.125 lbs  
64 in     34.0077 kg/m  2.0145 m      

 

 1/3/2012  9:40:00 AM  103 mmHg  77 mmHg  64 bpm  18 rpm  97.7 F  197.125 lbs  
64 in     33.84 kg/m2  2.01 m2      

 

 2011  9:06:00 AM  118 mmHg  68 mmHg  74 bpm     97.4 F  189 lbs          
        

 

 7/15/2011  10:06:00 AM  126 mmHg  70 mmHg  73 bpm  20 rpm  97.9 F  180.5 lbs  
64 in     30.98 kg/m2  1.92 m2  0 %  99 %

 

 2011  8:56:00 AM  96 mmHg  68 mmHg  66 bpm  20 rpm  97.5 F  177.5 lbs  64 
in     30.4675 kg/m  1.9067 m  0 %  98 %

 

 12/15/2010  10:43:00 AM  104 mmHg  64 mmHg  48 bpm     97.8 F  150 lbs        
          

 

 2010  9:15:00 AM  112 mmHg  72 mmHg  49 bpm     98 F  149 lbs  64 in     
25.5755 kg/m  1.747 m  81.5 %   

 

 2010  8:54:00 AM  117 mmHg  65 mmHg  73 bpm  20 rpm  97.8 F  160.5 lbs   
               

 

 2010  9:42:00 AM  134 mmHg  66 mmHg  66 bpm        161 lbs               
   

 

 4/15/2010  4:02:00 PM  135 mmHg  71 mmHg  77 bpm        162 lbs  64 in     
27.81 kg/m2  1.82 m2  89.9 %   







Social History







 Name  Description  Comments

 

 History of tobacco usage     Quit smoking in January - was smoking 1-2cig/day

 

 denies alcohol use      

 

 Tobacco  Former smoker   







History of Procedures







 Date Ordered  Description  Order Status

 

 2011 12:00 AM  URINE PREGNANCY TEST  Reviewed

 

 2011 12:00 AM  IMMUNIZATION ADMIN  Reviewed

 

 2011 12:00 AM  HPV VACCINE 4 VALENT IM  Reviewed

 

 2016 12:00 AM  CYTOPATH C/V THIN LAYER  Returned

 

 2016 12:00 AM  SPECIMEN HANDLING OFFICE-LAB  Reviewed

 

 2016 12:00 AM  N.GONORRHOEAE DNA AMP PROB  Returned

 

 2016 12:00 AM  CHLAMYDIA CULTURE  Returned

 

 2016 12:00 AM  HIV-1ANTIBODY  Returned

 

 2016 12:00 AM  URINALYSIS AUTO W/SCOPE  Returned

 

 2016 12:00 AM  OBSTETRIC PANEL  Returned

 

 2016 12:00 AM  GLUCOSE TOLERANCE TEST (GTT)  Returned

 

 3/28/2016 12:00 AM  ALPHA-FETOPROTEIN SERUM  Returned

 

 3/28/2016 12:00 AM  CHORIONIC GONADOTROPIN TEST  Returned

 

 3/28/2016 12:00 AM  CHORIONIC GONADOTROPIN ASSAY  Returned

 

 5/3/2016 12:00 AM  OB US >/=14 WKS SNGL FETUS  Returned

 

 2016 12:00 AM  RH IG FULL-DOSE IM  Returned

 

 2016 12:00 AM  Type and screen  Returned

 

 2016 12:00 AM  GLUCOSE TOLERANCE TEST (GTT)  Returned

 

 2016 12:00 AM  COMPLETE CBC W/AUTO DIFF WBC  Returned

 

 2012 12:00 AM  X-RAY EXAM KNEE 4 OR MORE  Returned

 

 4/15/2010 12:00 AM  OBSTETRIC PANEL  Reviewed

 

 4/15/2010 12:00 AM  HIV-1ANTIBODY  Reviewed

 

 4/15/2010 12:00 AM  URINALYSIS NONAUTO W/SCOPE  Reviewed

 

 4/15/2010 12:00 AM  OB US < 14 WKS SINGLE FETUS  Reviewed

 

 2010 12:00 AM  GLUCOSE TEST  Reviewed

 

 2010 12:00 AM  Rhogam  Reviewed

 

 2010 12:00 AM  Type and screen  Reviewed

 

 2010 12:00 AM  COMPLETE CBC W/AUTO DIFF WBC  Reviewed

 

 2010 12:00 AM  COMPLETE CBC W/AUTO DIFF WBC  Reviewed

 

 10/14/2010 12:00 AM  CULTURE OTHR SPECIMN AEROBIC  Reviewed

 

 12/15/2010 12:00 AM  INSERTION IMPLANON, IMPLANTABLE CONTRACEPTIVE CAPSULES  
Reviewed

 

 12/15/2010 12:00 AM  Etonogestrel (contraceptive) implant system, (Implanon)  
Reviewed

 

 7/15/2011 12:00 AM  CYTOPATH TBS C/V MANUAL  Reviewed

 

 7/15/2011 12:00 AM  CHLAMYDIA CULTURE  Reviewed

 

 7/15/2011 12:00 AM  N.GONORRHOEAE DNA AMP PROB  Reviewed

 

 7/15/2011 12:00 AM  URINE PREGNANCY TEST  Reviewed

 

 2011 12:00 AM  IMMUNIZATION ADMIN  Reviewed

 

 2011 12:00 AM  HPV VACCINE 4 VALENT IM  Reviewed







Results Summary







 Data and Description  Results

 

 2010 10:41 AM  RUBELLA 34.0 IU/mLCOLOR YELLOW APPEARANCE HAZY SPEC GRAV 
1.025 pH 7.0 PROTEIN TRACE GLUCOSE NEGATIVE KETONE 15 BILIRUBIN NEGATIVE BLOOD 
NEGATIVE NITRITE NEGATIVE LEUK SCREEN NEGATIVE CASTS/LPF NEGATIVE CRYSTALS 
TRACE AMORPH MUCOUS THRDS FEW BACTERIA NEGATIVE EPITH CELLS FEW SQUAMOUS 
TRICHOMONAS NEGATIVE YEAST NEGATIVE WBC 6.3 RBC 4.57 HGB 13.90 g/dLHCT 40.30 %
MCV 88.0 fLMCH 30.40 pgMCHC 34.50 g/dLMPV 9.20 fLPLT 258 %NEUT 65.10 %%LYMP 
28.30 %#NEUT 4.10 #LYMP 1.80 

 

 2010 3:48 PM  WBC 12.9 RBC 4.02 HGB 12.60 g/dLHCT 37.40 %MCV 93.0 fLMCH 
31.30 pgMCHC 33.70 g/dLRDW CV 14.10 %MPV 9.50 fLPLT 263 %NEUT 69.20 %%LYMP 
22.30 %%MONO 7.60 %%EOS 0.70 %%BASO 0.20 %#NEUT 8.91 #LYMP 2.88 #MONO 0.98 #EOS 
0.09 #BASO 0.03 

 

 10/22/2010 8:21 PM  COLOR YELLOW APPEARANCE CLEAR SPEC GRAV 1.010 pH 6.5 
PROTEIN NEGATIVE GLUCOSE NEGATIVE KETONE 15 BILIRUBIN NEGATIVE BLOOD NEGATIVE 
NITRITE NEGATIVE LEUK SCREEN SMALL CASTS/LPF NEGATIVE CRYSTALS NEGATIVE MUCOUS 
THRDS NEGATIVE BACTERIA 2++ EPITH CELLS 3+++ SQUAMOUS TRICHOMONAS NEGATIVE 
YEAST NEGATIVE 

 

 2010 11:58 PM  COLOR YELLOW APPEARANCE CLEAR SPEC GRAV 1.015 pH 6.5 
PROTEIN NEGATIVE GLUCOSE NEGATIVE KETONE NEGATIVE BILIRUBIN NEGATIVE BLOOD 
NEGATIVE NITRITE NEGATIVE LEUK SCREEN LARGE CASTS/LPF NEGATIVE CRYSTALS 
NEGATIVE MUCOUS THRDS NEGATIVE BACTERIA 2++ EPITH CELLS 2++ SQUAMOUS 
TRICHOMONAS NEGATIVE YEAST NEGATIVE 

 

 11/15/2010 5:30 PM  .0 IU/LURIC ACID 3.7 mg/dLGLUCOSE 66.0 mg/dLSODIUM 
137.0 mmol/LPOTASSIUM 3.10 mmol/LCHLORIDE 105.0 mmol/LCO2 21.0 mmol/LBUN 3.0 mg/
dLCREATININE 0.50 mg/dLSGOT/AST 20.0 IU/LSGPT/ALT 18.0 IU/LALK PHOS 152.0 IU/
LTOTAL PROTEIN 6.20 g/dLALBUMIN 3.40 g/dLTOTAL BILI 0.60 mg/dLCALCIUM 9.10 mg/
dLeGFR >60 mL/min/1.73 m2WBC 16.9 RBC 4.18 HGB 13.30 g/dLHCT 39.70 %MCV 95.0 
fLMCH 31.80 pgMCHC 33.50 g/dLRDW CV 14.0 %MPV 10.10 fLPLT 268 %NEUT 75.70 %%
LYMP 17.50 %%MONO 6.20 %%EOS 0.40 %%BASO 0.20 %#NEUT 12.82 #LYMP 2.97 #MONO 
1.05 #EOS 0.06 #BASO 0.03 

 

 11/15/2010 8:50 PM  PROTEIN UR 14.0 mg/dL

 

 2016 12:21 PM  Pap Smear Collected 

 

 2016 2:00 PM  WBC 9.8 RBC 4.60 HGB 13.60 g/dLHCT 41.40 %MCV 90.0 fLMCH 
29.60 pgMCHC 32.90 g/dLRDW CV 13.80 %MPV 9.70 fLPLT 271 %NEUT 59.80 %%LYMP 33.0 
%%MONO 6.40 %%EOS 0.60 %%BASO 0.20 %#NEUT 5.83 #LYMP 3.22 #MONO 0.62 #EOS 0.06 #
BASO 0.02 HIV AG/AB COMBO 0.14 RPR Non Reactive Rubella Antibodies, IgG 2.88 
Index

 

 2016 2:08 PM  COLOR YELLOW APPEARANCE CLEAR SPEC GRAV <=1.005 pH 6.0 
PROTEIN NEGATIVE GLUCOSE NEGATIVE mg/dLKETONE NEGATIVE BILIRUBIN NEGATIVE BLOOD 
NEGATIVE NITRITE NEGATIVE LEUK SCREEN TRACE CASTS/LPF NEGATIVE /LPFCRYSTALS 
NEGATIVE MUCOUS THRDS NEGATIVE BACTERIA NEGATIVE EPITH CELLS 3+++ SQUAMOUS /
HPFTRICHOMONAS NEGATIVE YEAST NEGATIVE 

 

 3/28/2016 2:18 PM  AFP Value 0.0240 ug/mLAFP MoM 1.02 hCG Value 72187.0 mIU/
mLhCG MoM 1.77 uE3 Value 0.70 ng/mLuE3 MoM 1.16 MARISA Value 207.520 pg/mLDIA MoM 
1.25 OSBR Risk       1 IN 15110 DSR (Second Trimester) 1IN 4435 DSR (By Age)    
1 IN 1003 T18 Risk Not increased T18 (By Age) 1:3907 

 

 2016 3:15 PM  WBC 11.5 RBC 3.75 HGB 11.70 g/dLHCT 35.50 %MCV 95.0 fLMCH 
31.20 pgMCHC 33.0 g/dLRDW CV 14.10 %MPV 9.70 fLPLT 273 %NEUT 73.0 %%LYMP 20.10 %
%MONO 5.70 %%EOS 0.80 %%BASO 0.10 %#NEUT 8.40 #LYMP 2.31 #MONO 0.65 #EOS 0.09 #
BASO 0.01 







History Of Immunizations







 Name  Date Admin  Mfg Name  Mfg Code  Trade Name  Lot#  Route  Inj  Vis Given  
Vis Pub  CVX

 

 HPV  2011  [employer]  MSD  GARDASIL  [id number]  Intramuscular  Left 
Deltoid  2011  999

 

 HPV  2011  [employer]  MSD  GARDASIL  [id number]  Intramuscular  Left 
Deltoid  2011  62







History of Past Illness







 Name  Date of Onset  Comments

 

 Atrial Septal Defect, Ostium Secundum Type      

 

 Prenatal Care, High Risk Pregnancy  2010   

 

    Apr 15 2010  4:29PM   

 

 Prenatal Care, High Risk Pregnancy  Apr 15 2010  4:13PM   

 

    2010  9:54AM   

 

 Prenatal Care, High Risk Pregnancy  2010 10:10AM   

 

    May 13 2010  9:01AM   

 

    John 10 2010  2:45PM   

 

    2010  4:16PM   

 

 Pregnancy, First Normal  Aug 30 2010  9:14AM   

 

 Pregnancy, High Risk  Sep 13 2010  3:13PM   

 

 Group B Strep Screening,   Oct 14 2010  5:43PM   

 

 Pregnancy, High Risk  Oct 14 2010  5:43PM   

 

 Postoperative Examination Following Surgery  Dec  6 2010  9:15AM   

 

 IMPLANON  Insertion  Dec 15 2010 10:44AM   

 

 General Medical Exam, Child  2011  8:58AM   

 

 Contraceptive Management  2011  8:58AM   

 

 Routine gynecological examination  Jul 15 2011 10:03AM   

 

 Contraceptive Counseling  Jul 15 2011 10:03AM   

 

 Vaginal Discharge  Jul 15 2011 10:03AM   

 

 Irregular Menses  Jul 15 2011 10:03AM   

 

 Gardsil (HPV)  2011  8:54AM   

 

 Irregular Menses  Aug 31 2011  9:07AM   

 

 Family Planning  Aug 31 2011  9:07AM   

 

 Contraceptive Management  Andreas  3 2012  9:43AM   

 

 Local Infection  Andreas  3 2012  9:43AM   

 

 Well Child Examination  2012  1:46PM   

 

 Right knee pain  2012  1:46PM   

 

 Right knee pain  2012  1:24PM   

 

 Abscess  2012  1:24PM   

 

 Right knee pain  Mar 26 2012 10:32AM   

 

 General Medical Exam, Adult  2012  1:46PM   

 

 Pregnancy test confirmed positive  2016 11:37AM   

 

 Obesity  2016 11:37AM   

 

 Normal pregnancy in multigravida in second trimester  Mar 28 2016  2:01PM   

 

 History of atrial septal defect repair  May 11 2016  8:50AM   

 

 Normal pregnancy in multigravida in second trimester  2016  2:00PM   







Payers







 Insurance Name  Company Name  Plan Name  Plan Number  Policy Number  Policy 
Group Number  Start Date

 

    [facility] [employer] - [employer] [facility]  
   [id number]     N/A

 

    [employer] [employer]  [place] [employer]     
[id number]     N/A

 

    zzzTest Medicare A  Test Medicare A     void     N/A







History of Encounters







 Visit Date  Visit Type  Provider

 

 2016  Office visit  Dr. Antonella Ventura MD

 

 2016  Office visit  Dr. Antonella Ventura MD

 

 2016  Office visit  Dr. Antonella Ventura MD

 

 3/28/2016  Office visit  Dr. Antonella Ventura MD

 

 2016  Office visit  Dr. Antonella Ventura MD

 

 2016  Office visit  Dr. Antonella Ventura MD

 

 2016  Office visit  Velma Mendieta APRN

 

 3/26/2012  Office visit  Ivanna Swan APRN

 

 2012  Office visit  Ivanna Swan APRN

 

 2012  Office visit  Ivanna Swan APRN

 

 1/3/2012  Office visit  Ivanna Swan APRN

 

 2011  Nurse visit  Ivanna Swan APRN

 

 2011  Nurse visit  Ivanna Swan APRN

 

 7/15/2011  Office visit  Ivanna Swan APRN

 

 2011  Office visit  Ivanna Swan APRN

 

 12/15/2010  Office visit  Willard Sorensen MD

 

 2010  Surgery  Willard Sorensen MD

 

 2010  Hospital  Willard Sorensen MD

 

 11/15/2010  Office visit  Willard Sorensen MD

 

 11/15/2010  [facility]  Willard Sorensen MD

 

 2010  Office visit  Willard Sorensen MD

 

 2010  Office visit  Willard Sorensen MD

 

 10/28/2010  Office visit  Willard Sorensen MD

 

 10/21/2010  Office visit  Willard Sorensen MD

 

 10/14/2010  Office visit  Willard Sorensen MD

 

 10/9/2010  [facility]  Willard Sorensen MD

 

 10/7/2010  Office visit  Willard Sorensen MD

 

 2010  Office visit  Willard Sorensen MD

 

 2010  Office visit  Willard Sorensen MD

 

 2010  Office visit  Willard Sorensen MD

 

 2010  Office visit  Willard Sorensen MD

 

 2010  Office visit  Willard Sorensen MD

 

 6/10/2010  Office visit  Willard Sorensen MD

 

 2010  Office visit  Willard Sorensen MD

 

 2010  Office visit  Willard Sorensen MD

 

 4/15/2010  Office visit  Willard Sorensen MD

## 2018-11-29 NOTE — XMS REPORT
MU2 Ambulatory Summary

 Created on: 2016



Radha Aviles

External Reference #: [id number]

: 1990

Sex: Female



Demographics







 Address  [address] [place], [place] [address] [address]

[place]  [address]

 

 Home Phone  [phone]

 

 Preferred Language  English

 

 Marital Status  Never 

 

 Gnosticist Affiliation  Unknown

 

 Race  White

 

 Ethnic Group  Not  or 





Author







 Author  Antonella Ventura

 

 [facility]

 

 Address  [address]

[place], [place]  [id number]



 

 Phone  [phone]







Care Team Providers







 Care Team Member Name  Role  Phone

 

 Antonella Ventura  PCP  Unavailable







Allergies and Adverse Reactions







 Name  Reaction  Notes

 

 NO KNOWN DRUG ALLERGIES      







Plan of Treatment

Not available.



Medications







  

 

 Name  Start Date  Expiration Date  SIG  Comments

 

 [place] 60 mg iron-1 mg oral tablet  4/15/2010  3/11/2011  take 1 tablet by 
oral route once daily for 30 days   

 

 Diflucan 150 mg oral tablet  2010  take 1 tablet (150 mg) by 
oral route once  for 1 days   

 

 Premarin 1.25 mg oral tablet  2011  take 1 tablet by oral route 
daily for 10 days   

 

 doxycycline hyclate 100 mg oral tablet  7/15/2011  2011  take 1 tablet (
100 mg) by oral route every 12 hours for 10 days   

 

 ibuprofen 800 mg oral tablet  2012  take 1 tablet by oral route 
3 times a day for 30 days   

 

 Bactrim -160 mg oral tablet  2012  take 1 tablet by oral 
route every 12 hours for 7 days   

 

 Macrobid 100 mg oral capsule  2016  take 1 capsule (100 mg) by 
oral route 2 times per day with food for 7 days   









 Discontinued 

 

 Name  Start Date  Discontinued Date  SIG  Comments

 

 atenolol 25 mg oral tablet     2011  take 1/2 tablet by oral route QD   

 

 Medrol (Antwan) 4 mg oral tablets,dose pack  2012  3/26/2012  take as 
directed   







Problem List







 Description  Status  Onset

 

 Prenatal Care, High Risk Pregnancy  Active  2010







Vital Signs







 Date  Time  BP-Sys(mm[Hg]  BP-Marisa(mm[Hg])  HR(bpm)  RR(rpm)  Temp  WT  HT  HC  
BMI  BSA  BMI Percentile  O2 Sat(%)

 

 2016  11:15:00 AM  130 mmHg  70 mmHg  80 bpm  20 rpm     214 lbs  64 in  
   36.73 kg/m2  2.09 m2      

 

 3/26/2012  10:30:00 AM  126 mmHg  62 mmHg  106 bpm  18 rpm  96.8 F  205 lbs  
64 in     35.1878 kg/m  2.0491 m     97 %

 

 2012  1:22:00 PM  120 mmHg  68 mmHg  66 bpm  18 rpm  97 F  202.375 lbs  
64 in     34.74 kg/m2  2.04 m2      

 

 2012  1:44:00 PM  122 mmHg  64 mmHg  68 bpm  18 rpm  98.2 F  198.125 lbs  
64 in     34.0077 kg/m  2.0145 m      

 

 1/3/2012  9:40:00 AM  103 mmHg  77 mmHg  64 bpm  18 rpm  97.7 F  197.125 lbs  
64 in     33.84 kg/m2  2.01 m2      

 

 2011  9:06:00 AM  118 mmHg  68 mmHg  74 bpm     97.4 F  189 lbs          
        

 

 7/15/2011  10:06:00 AM  126 mmHg  70 mmHg  73 bpm  20 rpm  97.9 F  180.5 lbs  
64 in     30.98 kg/m2  1.92 m2  0 %  99 %

 

 2011  8:56:00 AM  96 mmHg  68 mmHg  66 bpm  20 rpm  97.5 F  177.5 lbs  64 
in     30.4675 kg/m  1.9067 m  0 %  98 %

 

 12/15/2010  10:43:00 AM  104 mmHg  64 mmHg  48 bpm     97.8 F  150 lbs        
          

 

 2010  9:15:00 AM  112 mmHg  72 mmHg  49 bpm     98 F  149 lbs  64 in     
25.5755 kg/m  1.747 m  81.5 %   

 

 2010  8:54:00 AM  117 mmHg  65 mmHg  73 bpm  20 rpm  97.8 F  160.5 lbs   
               

 

 2010  9:42:00 AM  134 mmHg  66 mmHg  66 bpm        161 lbs               
   

 

 4/15/2010  4:02:00 PM  135 mmHg  71 mmHg  77 bpm        162 lbs  64 in     
27.81 kg/m2  1.82 m2  89.9 %   







Social History







 Name  Description  Comments

 

 History of tobacco usage     Quit smoking in January - was smoking 1-2cig/day

 

 denies alcohol use      

 

 Tobacco  Former smoker   







History of Procedures







 Date Ordered  Description  Order Status

 

 2011 12:00 AM  URINE PREGNANCY TEST  Reviewed

 

 2011 12:00 AM  IMMUNIZATION ADMIN  Reviewed

 

 2011 12:00 AM  HPV VACCINE 4 VALENT IM  Reviewed

 

 2016 12:00 AM  CYTOPATH C/V THIN LAYER  Returned

 

 2016 12:00 AM  SPECIMEN HANDLING OFFICE-LAB  Reviewed

 

 2016 12:00 AM  N.GONORRHOEAE DNA AMP PROB  Returned

 

 2016 12:00 AM  CHLAMYDIA CULTURE  Returned

 

 2016 12:00 AM  HIV-1ANTIBODY  Returned

 

 2016 12:00 AM  URINALYSIS AUTO W/SCOPE  Returned

 

 2016 12:00 AM  OBSTETRIC PANEL  Returned

 

 2016 12:00 AM  GLUCOSE TOLERANCE TEST (GTT)  Returned

 

 2012 12:00 AM  X-RAY EXAM KNEE 4 OR MORE  Returned

 

 4/15/2010 12:00 AM  OBSTETRIC PANEL  Reviewed

 

 4/15/2010 12:00 AM  HIV-1ANTIBODY  Reviewed

 

 4/15/2010 12:00 AM  URINALYSIS NONAUTO W/SCOPE  Reviewed

 

 4/15/2010 12:00 AM  OB US < 14 WKS SINGLE FETUS  Reviewed

 

 2010 12:00 AM  GLUCOSE TEST  Reviewed

 

 2010 12:00 AM  Rhogam  Reviewed

 

 2010 12:00 AM  Type and screen  Reviewed

 

 2010 12:00 AM  COMPLETE CBC W/AUTO DIFF WBC  Reviewed

 

 2010 12:00 AM  COMPLETE CBC W/AUTO DIFF WBC  Reviewed

 

 10/14/2010 12:00 AM  CULTURE OTHR SPECIMN AEROBIC  Reviewed

 

 12/15/2010 12:00 AM  INSERTION IMPLANON, IMPLANTABLE CONTRACEPTIVE CAPSULES  
Reviewed

 

 12/15/2010 12:00 AM  Etonogestrel (contraceptive) implant system, (Implanon)  
Reviewed

 

 7/15/2011 12:00 AM  CYTOPATH TBS C/V MANUAL  Reviewed

 

 7/15/2011 12:00 AM  CHLAMYDIA CULTURE  Reviewed

 

 7/15/2011 12:00 AM  N.GONORRHOEAE DNA AMP PROB  Reviewed

 

 7/15/2011 12:00 AM  URINE PREGNANCY TEST  Reviewed

 

 2011 12:00 AM  IMMUNIZATION ADMIN  Reviewed

 

 2011 12:00 AM  HPV VACCINE 4 VALENT IM  Reviewed







Results Summary







 Data and Description  Results

 

 2010 10:41 AM  RUBELLA 34.0 IU/mLCOLOR YELLOW APPEARANCE HAZY SPEC GRAV 
1.025 pH 7.0 PROTEIN TRACE GLUCOSE NEGATIVE KETONE 15 BILIRUBIN NEGATIVE BLOOD 
NEGATIVE NITRITE NEGATIVE LEUK SCREEN NEGATIVE CASTS/LPF NEGATIVE CRYSTALS 
TRACE AMORPH MUCOUS THRDS FEW BACTERIA NEGATIVE EPITH CELLS FEW SQUAMOUS 
TRICHOMONAS NEGATIVE YEAST NEGATIVE WBC 6.3 RBC 4.57 HGB 13.90 g/dLHCT 40.30 %
MCV 88.0 fLMCH 30.40 pgMCHC 34.50 g/dLMPV 9.20 fLPLT 258 %NEUT 65.10 %%LYMP 
28.30 %#NEUT 4.10 #LYMP 1.80 

 

 2010 3:48 PM  WBC 12.9 RBC 4.02 HGB 12.60 g/dLHCT 37.40 %MCV 93.0 fLMCH 
31.30 pgMCHC 33.70 g/dLRDW CV 14.10 %MPV 9.50 fLPLT 263 %NEUT 69.20 %%LYMP 
22.30 %%MONO 7.60 %%EOS 0.70 %%BASO 0.20 %#NEUT 8.91 #LYMP 2.88 #MONO 0.98 #EOS 
0.09 #BASO 0.03 

 

 10/22/2010 8:21 PM  COLOR YELLOW APPEARANCE CLEAR SPEC GRAV 1.010 pH 6.5 
PROTEIN NEGATIVE GLUCOSE NEGATIVE KETONE 15 BILIRUBIN NEGATIVE BLOOD NEGATIVE 
NITRITE NEGATIVE LEUK SCREEN SMALL CASTS/LPF NEGATIVE CRYSTALS NEGATIVE MUCOUS 
THRDS NEGATIVE BACTERIA 2++ EPITH CELLS 3+++ SQUAMOUS TRICHOMONAS NEGATIVE 
YEAST NEGATIVE 

 

 2010 11:58 PM  COLOR YELLOW APPEARANCE CLEAR SPEC GRAV 1.015 pH 6.5 
PROTEIN NEGATIVE GLUCOSE NEGATIVE KETONE NEGATIVE BILIRUBIN NEGATIVE BLOOD 
NEGATIVE NITRITE NEGATIVE LEUK SCREEN LARGE CASTS/LPF NEGATIVE CRYSTALS 
NEGATIVE MUCOUS THRDS NEGATIVE BACTERIA 2++ EPITH CELLS 2++ SQUAMOUS 
TRICHOMONAS NEGATIVE YEAST NEGATIVE 

 

 11/15/2010 5:30 PM  .0 IU/LURIC ACID 3.7 mg/dLGLUCOSE 66.0 mg/dLSODIUM 
137.0 mmol/LPOTASSIUM 3.10 mmol/LCHLORIDE 105.0 mmol/LCO2 21.0 mmol/LBUN 3.0 mg/
dLCREATININE 0.50 mg/dLSGOT/AST 20.0 IU/LSGPT/ALT 18.0 IU/LALK PHOS 152.0 IU/
LTOTAL PROTEIN 6.20 g/dLALBUMIN 3.40 g/dLTOTAL BILI 0.60 mg/dLCALCIUM 9.10 mg/
dLeGFR >60 mL/min/1.73 m2WBC 16.9 RBC 4.18 HGB 13.30 g/dLHCT 39.70 %MCV 95.0 
fLMCH 31.80 pgMCHC 33.50 g/dLRDW CV 14.0 %MPV 10.10 fLPLT 268 %NEUT 75.70 %%
LYMP 17.50 %%MONO 6.20 %%EOS 0.40 %%BASO 0.20 %#NEUT 12.82 #LYMP 2.97 #MONO 
1.05 #EOS 0.06 #BASO 0.03 

 

 11/15/2010 8:50 PM  PROTEIN UR 14.0 mg/dL

 

 2016 12:21 PM  Pap Smear Collected 

 

 2016 2:00 PM  WBC 9.8 RBC 4.60 HGB 13.60 g/dLHCT 41.40 %MCV 90.0 fLMCH 
29.60 pgMCHC 32.90 g/dLRDW CV 13.80 %MPV 9.70 fLPLT 271 %NEUT 59.80 %%LYMP 33.0 
%%MONO 6.40 %%EOS 0.60 %%BASO 0.20 %#NEUT 5.83 #LYMP 3.22 #MONO 0.62 #EOS 0.06 #
BASO 0.02 HIV AG/AB COMBO 0.14 RPR Non Reactive Rubella Antibodies, IgG 2.88 
Index

 

 2016 2:08 PM  COLOR YELLOW APPEARANCE CLEAR SPEC GRAV <=1.005 pH 6.0 
PROTEIN NEGATIVE GLUCOSE NEGATIVE mg/dLKETONE NEGATIVE BILIRUBIN NEGATIVE BLOOD 
NEGATIVE NITRITE NEGATIVE LEUK SCREEN TRACE CASTS/LPF NEGATIVE /LPFCRYSTALS 
NEGATIVE MUCOUS THRDS NEGATIVE BACTERIA NEGATIVE EPITH CELLS 3+++ SQUAMOUS /
HPFTRICHOMONAS NEGATIVE YEAST NEGATIVE 







History Of Immunizations







 Name  Date Admin  Mfg Name  Mfg Code  Trade Name  Lot#  Route  Inj  Vis Given  
Vis Pub  CVX

 

 HPV  2011  [employer]  MSD  GARDASIL  [id number]  Intramuscular  Left 
Deltoid  2011  999

 

 HPV  2011  [employer]  MSD  GARDASIL  [id number]  Intramuscular  Left 
Deltoid  2011  62







History of Past Illness







 Name  Date of Onset  Comments

 

 Atrial Septal Defect, Ostium Secundum Type      

 

 Prenatal Care, High Risk Pregnancy  2010   

 

    Apr 15 2010  4:29PM   

 

 Prenatal Care, High Risk Pregnancy  Apr 15 2010  4:13PM   

 

    2010  9:54AM   

 

 Prenatal Care, High Risk Pregnancy  2010 10:10AM   

 

    May 13 2010  9:01AM   

 

    John 10 2010  2:45PM   

 

    2010  4:16PM   

 

 Pregnancy, First Normal  Aug 30 2010  9:14AM   

 

 Pregnancy, High Risk  Sep 13 2010  3:13PM   

 

 Group B Strep Screening,   Oct 14 2010  5:43PM   

 

 Pregnancy, High Risk  Oct 14 2010  5:43PM   

 

 Postoperative Examination Following Surgery  Dec  6 2010  9:15AM   

 

 IMPLANON  Insertion  Dec 15 2010 10:44AM   

 

 General Medical Exam, Child  2011  8:58AM   

 

 Contraceptive Management  2011  8:58AM   

 

 Routine gynecological examination  Jul 15 2011 10:03AM   

 

 Contraceptive Counseling  Jul 15 2011 10:03AM   

 

 Vaginal Discharge  Jul 15 2011 10:03AM   

 

 Irregular Menses  Jul 15 2011 10:03AM   

 

 Gardsil (HPV)  2011  8:54AM   

 

 Irregular Menses  Aug 31 2011  9:07AM   

 

 Family Planning  Aug 31 2011  9:07AM   

 

 Contraceptive Management  Andreas  3 2012  9:43AM   

 

 Local Infection  Andreas  3 2012  9:43AM   

 

 Well Child Examination  2012  1:46PM   

 

 Right knee pain  2012  1:46PM   

 

 Right knee pain  2012  1:24PM   

 

 Abscess  2012  1:24PM   

 

 Right knee pain  Mar 26 2012 10:32AM   

 

 General Medical Exam, Adult  2012  1:46PM   

 

 Pregnancy test confirmed positive  2016 11:37AM   

 

 Obesity  2016 11:37AM   







Payers







 Insurance Name  Company Name  Plan Name  Plan Number  Policy Number  Policy 
Group Number  Start Date

 

    [place] [employer]  [facility] Prog     
[id number]     N/A

 

    [facility]     void     N/A







History of Encounters







 Visit Date  Visit Type  Provider

 

 2016  Office visit  Dr. Antonella Vetnura MD

 

 2016  Office visit  Dr. Antonella Ventura MD

 

 2016  Office visit  Velma Mendieta APRN

 

 3/26/2012  Office visit  Ivanna Swan APRN

 

 2012  Office visit  Ivanna Swan APRN

 

 2012  Office visit  Ivanna Swan APRN

 

 1/3/2012  Office visit  Ivanna Swan APRN

 

 2011  Nurse visit  Ivanna Swan APRN

 

 2011  Nurse visit  Ivanna Swan APRN

 

 7/15/2011  Office visit  Ivanna Swan APRN

 

 2011  Office visit  Ivanna Swan APRN

 

 12/15/2010  Office visit  Willard Sorensen MD

 

 2010  Surgery  Willard Sorensen MD

 

 2010  [facility]  Willard Sorensen MD

 

 11/15/2010  Office visit  Willard Sorensen MD

 

 11/15/2010  [facility]  Willard Sorensen MD

 

 2010  Office visit  Willard Sorensen MD

 

 2010  Office visit  Willard Sorensen MD

 

 10/28/2010  Office visit  Willard Sorensen MD

 

 10/21/2010  Office visit  Willard Sorensen MD

 

 10/14/2010  Office visit  Willard Sorensen MD

 

 10/9/2010  [facility]  Willard Sorensen MD

 

 10/7/2010  Office visit  Willard Sorensen MD

 

 2010  Office visit  Willard Sorensen MD

 

 2010  Office visit  Willard Sorensen MD

 

 2010  Office visit  Willard Sorensen MD

 

 2010  Office visit  Willard Sorensen MD

 

 2010  Office visit  Willard Sorensen MD

 

 6/10/2010  Office visit  Willard Sorensen MD

 

 2010  Office visit  Willard Sorensen MD

 

 2010  Office visit  Willard Sorensen MD

 

 4/15/2010  Office visit  Willard Sorensen MD

## 2018-11-29 NOTE — XMS REPORT
MU2 Ambulatory Summary

 Created on: 2016



Radha Aviles

External Reference #: [id number]

: 1990

Sex: Female



Demographics







 Address  [address] [place] [address] [address]

[place]  [address]

 

 Home Phone  [phone]

 

 Preferred Language  English

 

 Marital Status  

 

 Yazidi Affiliation  Unknown

 

 Race  White

 

 Ethnic Group  Not  or 





Author







 Author  Antonella Ventura

 

 [facility]

 

 Address  [address]

[place], [place]  [id number]



 

 Phone  [phone]







Care Team Providers







 Care Team Member Name  Role  Phone

 

 Antonella Ventura  PCP  Unavailable







Allergies and Adverse Reactions







 Name  Reaction  Notes

 

 NO KNOWN DRUG ALLERGIES      







Plan of Treatment







 Planned Activity  Comments  Planned Date  Planned Time  Plan/Goal

 

 OB US LIMITED FETUS(S)     2016  12:00 AM   







Medications







 Active 

 

 Name  Start Date  Estimated Completion Date  SIG  Comments

 

 ferrous sulfate 325 mg (65 mg iron) oral tablet        take 1 tablet by oral 
route 2 times a day   

 

 docusate sodium 100 mg oral capsule        take 1 capsule (100 mg) by oral 
route once daily   

 

 Norco 5-325 mg oral tablet  2016     take 1 tablet by oral route every 6 
hours as needed for pain   









  

 

 Name  Start Date  Expiration Date  SIG  Comments

 

 [place] 60 mg iron-1 mg oral tablet  4/15/2010  3/11/2011  take 1 tablet by 
oral route once daily for 30 days   

 

 Diflucan 150 mg oral tablet  2010  take 1 tablet (150 mg) by 
oral route once  for 1 days   

 

 Premarin 1.25 mg oral tablet  2011  take 1 tablet by oral route 
daily for 10 days   

 

 doxycycline hyclate 100 mg oral tablet  7/15/2011  2011  take 1 tablet (
100 mg) by oral route every 12 hours for 10 days   

 

 ibuprofen 800 mg oral tablet  2012  take 1 tablet by oral route 
3 times a day for 30 days   

 

 Bactrim -160 mg oral tablet  2012  take 1 tablet by oral 
route every 12 hours for 7 days   

 

 Macrobid 100 mg oral capsule  2016  take 1 capsule (100 mg) by 
oral route 2 times per day with food for 7 days   









 Discontinued 

 

 Name  Start Date  Discontinued Date  SIG  Comments

 

 atenolol 25 mg oral tablet     2011  take 1/2 tablet by oral route QD   

 

 Medrol (Antwan) 4 mg oral tablets,dose pack  2012  3/26/2012  take as 
directed   

 

 oxycodone-acetaminophen 5-325 mg oral tablet     2016  take 1 tablet by 
oral route every 6 hours as needed   







Problem List







 Description  Status  Onset

 

 Prenatal Care, High Risk Pregnancy  Active  2010







Vital Signs







 Date  Time  BP-Sys(mm[Hg]  BP-Marisa(mm[Hg])  HR(bpm)  RR(rpm)  Temp  WT  HT  HC  
BMI  BSA  BMI Percentile  O2 Sat(%)

 

 2016  9:20:00 AM  131 mmHg  88 mmHg  81 bpm     97.4 F                   
  

 

 2016  3:19:00 PM  121 mmHg  59 mmHg  91 bpm        178 lbs  64 in     
30.5533 kg/m  1.9094 m      

 

 2016  11:15:00 AM  130 mmHg  70 mmHg  80 bpm  20 rpm     214 lbs  64 in  
   36.73 kg/m2  2.09 m2      

 

 3/26/2012  10:30:00 AM  126 mmHg  62 mmHg  106 bpm  18 rpm  96.8 F  205 lbs  
64 in     35.1878 kg/m  2.0491 m     97 %

 

 2012  1:22:00 PM  120 mmHg  68 mmHg  66 bpm  18 rpm  97 F  202.375 lbs  
64 in     34.74 kg/m2  2.04 m2      

 

 2012  1:44:00 PM  122 mmHg  64 mmHg  68 bpm  18 rpm  98.2 F  198.125 lbs  
64 in     34.0077 kg/m  2.0145 m      

 

 1/3/2012  9:40:00 AM  103 mmHg  77 mmHg  64 bpm  18 rpm  97.7 F  197.125 lbs  
64 in     33.84 kg/m2  2.01 m2      

 

 2011  9:06:00 AM  118 mmHg  68 mmHg  74 bpm     97.4 F  189 lbs          
        

 

 7/15/2011  10:06:00 AM  126 mmHg  70 mmHg  73 bpm  20 rpm  97.9 F  180.5 lbs  
64 in     30.98 kg/m2  1.92 m2  0 %  99 %

 

 2011  8:56:00 AM  96 mmHg  68 mmHg  66 bpm  20 rpm  97.5 F  177.5 lbs  64 
in     30.4675 kg/m  1.9067 m  0 %  98 %

 

 12/15/2010  10:43:00 AM  104 mmHg  64 mmHg  48 bpm     97.8 F  150 lbs        
          

 

 2010  9:15:00 AM  112 mmHg  72 mmHg  49 bpm     98 F  149 lbs  64 in     
25.5755 kg/m  1.747 m  81.5 %   

 

 2010  8:54:00 AM  117 mmHg  65 mmHg  73 bpm  20 rpm  97.8 F  160.5 lbs   
               

 

 2010  9:42:00 AM  134 mmHg  66 mmHg  66 bpm        161 lbs               
   

 

 4/15/2010  4:02:00 PM  135 mmHg  71 mmHg  77 bpm        162 lbs  64 in     
27.81 kg/m2  1.82 m2  89.9 %   







Social History







 Name  Description  Comments

 

 History of tobacco usage     Quit smoking in January - was smoking 1-2cig/day

 

 denies alcohol use      

 

 Tobacco  Former smoker   







History of Procedures







 Date Ordered  Description  Order Status

 

 2011 12:00 AM  URINE PREGNANCY TEST  Reviewed

 

 2011 12:00 AM  IMMUNIZATION ADMIN  Reviewed

 

 2011 12:00 AM  HPV VACCINE 4 VALENT IM  Reviewed

 

 2016 12:00 AM  CYTOPATH C/V THIN LAYER  Returned

 

 2016 12:00 AM  SPECIMEN HANDLING OFFICE-LAB  Reviewed

 

 2016 12:00 AM  N.GONORRHOEAE DNA AMP PROB  Returned

 

 2016 12:00 AM  CHLAMYDIA CULTURE  Returned

 

 2016 12:00 AM  HIV-1ANTIBODY  Returned

 

 2016 12:00 AM  URINALYSIS AUTO W/SCOPE  Returned

 

 2016 12:00 AM  OBSTETRIC PANEL  Returned

 

 2016 12:00 AM  GLUCOSE TOLERANCE TEST (GTT)  Returned

 

 3/28/2016 12:00 AM  ALPHA-FETOPROTEIN SERUM  Returned

 

 3/28/2016 12:00 AM  CHORIONIC GONADOTROPIN TEST  Returned

 

 3/28/2016 12:00 AM  CHORIONIC GONADOTROPIN ASSAY  Returned

 

 5/3/2016 12:00 AM  OB US >/=14 WKS SNGL FETUS  Returned

 

 2016 12:00 AM  RH IG FULL-DOSE IM  Returned

 

 2016 12:00 AM  Type and screen  Returned

 

 2016 12:00 AM  GLUCOSE TOLERANCE TEST (GTT)  Returned

 

 2016 12:00 AM  COMPLETE CBC W/AUTO DIFF WBC  Returned

 

 2012 12:00 AM  X-RAY EXAM KNEE 4 OR MORE  Returned

 

 2016 12:00 AM  TDAP VACCINE 7 YRS/> IM  Reviewed

 

 2016 12:00 AM  IMMUNIZATION ADMIN  Reviewed

 

 2016 12:00 AM  CULTURE SCREEN ONLY  Returned

 

 2016 12:00 AM  OB US LIMITED FETUS(S)  Returned

 

 4/15/2010 12:00 AM  OBSTETRIC PANEL  Reviewed

 

 4/15/2010 12:00 AM  HIV-1ANTIBODY  Reviewed

 

 4/15/2010 12:00 AM  URINALYSIS NONAUTO W/SCOPE  Reviewed

 

 4/15/2010 12:00 AM  OB US < 14 WKS SINGLE FETUS  Reviewed

 

 2010 12:00 AM  GLUCOSE TEST  Reviewed

 

 2010 12:00 AM  Rhogam  Reviewed

 

 2010 12:00 AM  Type and screen  Reviewed

 

 2010 12:00 AM  COMPLETE CBC W/AUTO DIFF WBC  Reviewed

 

 2010 12:00 AM  COMPLETE CBC W/AUTO DIFF WBC  Reviewed

 

 10/14/2010 12:00 AM  CULTURE OTHR SPECIMN AEROBIC  Reviewed

 

 12/15/2010 12:00 AM  INSERTION IMPLANON, IMPLANTABLE CONTRACEPTIVE CAPSULES  
Reviewed

 

 12/15/2010 12:00 AM  Etonogestrel (contraceptive) implant system, (Implanon)  
Reviewed

 

 7/15/2011 12:00 AM  CYTOPATH TBS C/V MANUAL  Reviewed

 

 7/15/2011 12:00 AM  CHLAMYDIA CULTURE  Reviewed

 

 7/15/2011 12:00 AM  N.GONORRHOEAE DNA AMP PROB  Reviewed

 

 7/15/2011 12:00 AM  URINE PREGNANCY TEST  Reviewed

 

 2011 12:00 AM  IMMUNIZATION ADMIN  Reviewed

 

 2011 12:00 AM  HPV VACCINE 4 VALENT IM  Reviewed







Results Summary







 Data and Description  Results

 

 2010 10:41 AM  RUBELLA 34.0 IU/mLCOLOR YELLOW APPEARANCE HAZY SPEC GRAV 
1.025 pH 7.0 PROTEIN TRACE GLUCOSE NEGATIVE KETONE 15 BILIRUBIN NEGATIVE BLOOD 
NEGATIVE NITRITE NEGATIVE LEUK SCREEN NEGATIVE CASTS/LPF NEGATIVE CRYSTALS 
TRACE AMORPH MUCOUS THRDS FEW BACTERIA NEGATIVE EPITH CELLS FEW SQUAMOUS 
TRICHOMONAS NEGATIVE YEAST NEGATIVE WBC 6.3 RBC 4.57 HGB 13.90 g/dLHCT 40.30 %
MCV 88.0 fLMCH 30.40 pgMCHC 34.50 g/dLMPV 9.20 fLPLT 258 %NEUT 65.10 %%LYMP 
28.30 %#NEUT 4.10 #LYMP 1.80 

 

 2010 3:48 PM  WBC 12.9 RBC 4.02 HGB 12.60 g/dLHCT 37.40 %MCV 93.0 fLMCH 
31.30 pgMCHC 33.70 g/dLRDW CV 14.10 %MPV 9.50 fLPLT 263 %NEUT 69.20 %%LYMP 
22.30 %%MONO 7.60 %%EOS 0.70 %%BASO 0.20 %#NEUT 8.91 #LYMP 2.88 #MONO 0.98 #EOS 
0.09 #BASO 0.03 

 

 10/22/2010 8:21 PM  COLOR YELLOW APPEARANCE CLEAR SPEC GRAV 1.010 pH 6.5 
PROTEIN NEGATIVE GLUCOSE NEGATIVE KETONE 15 BILIRUBIN NEGATIVE BLOOD NEGATIVE 
NITRITE NEGATIVE LEUK SCREEN SMALL CASTS/LPF NEGATIVE CRYSTALS NEGATIVE MUCOUS 
THRDS NEGATIVE BACTERIA 2++ EPITH CELLS 3+++ SQUAMOUS TRICHOMONAS NEGATIVE 
YEAST NEGATIVE 

 

 2010 11:58 PM  COLOR YELLOW APPEARANCE CLEAR SPEC GRAV 1.015 pH 6.5 
PROTEIN NEGATIVE GLUCOSE NEGATIVE KETONE NEGATIVE BILIRUBIN NEGATIVE BLOOD 
NEGATIVE NITRITE NEGATIVE LEUK SCREEN LARGE CASTS/LPF NEGATIVE CRYSTALS 
NEGATIVE MUCOUS THRDS NEGATIVE BACTERIA 2++ EPITH CELLS 2++ SQUAMOUS 
TRICHOMONAS NEGATIVE YEAST NEGATIVE 

 

 11/15/2010 5:30 PM  .0 IU/LURIC ACID 3.7 mg/dLGLUCOSE 66.0 mg/dLSODIUM 
137.0 mmol/LPOTASSIUM 3.10 mmol/LCHLORIDE 105.0 mmol/LCO2 21.0 mmol/LBUN 3.0 mg/
dLCREATININE 0.50 mg/dLSGOT/AST 20.0 IU/LSGPT/ALT 18.0 IU/LALK PHOS 152.0 IU/
LTOTAL PROTEIN 6.20 g/dLALBUMIN 3.40 g/dLTOTAL BILI 0.60 mg/dLCALCIUM 9.10 mg/
dLeGFR >60 mL/min/1.73 m2WBC 16.9 RBC 4.18 HGB 13.30 g/dLHCT 39.70 %MCV 95.0 
fLMCH 31.80 pgMCHC 33.50 g/dLRDW CV 14.0 %MPV 10.10 fLPLT 268 %NEUT 75.70 %%
LYMP 17.50 %%MONO 6.20 %%EOS 0.40 %%BASO 0.20 %#NEUT 12.82 #LYMP 2.97 #MONO 
1.05 #EOS 0.06 #BASO 0.03 

 

 11/15/2010 8:50 PM  PROTEIN UR 14.0 mg/dL

 

 2016 12:21 PM  Pap Smear Collected 

 

 2016 2:00 PM  WBC 9.8 RBC 4.60 HGB 13.60 g/dLHCT 41.40 %MCV 90.0 fLMCH 
29.60 pgMCHC 32.90 g/dLRDW CV 13.80 %MPV 9.70 fLPLT 271 %NEUT 59.80 %%LYMP 33.0 
%%MONO 6.40 %%EOS 0.60 %%BASO 0.20 %#NEUT 5.83 #LYMP 3.22 #MONO 0.62 #EOS 0.06 #
BASO 0.02 HIV AG/AB COMBO 0.14 RPR Non Reactive Rubella Antibodies, IgG 2.88 
Index

 

 2016 2:08 PM  COLOR YELLOW APPEARANCE CLEAR SPEC GRAV <=1.005 pH 6.0 
PROTEIN NEGATIVE GLUCOSE NEGATIVE mg/dLKETONE NEGATIVE BILIRUBIN NEGATIVE BLOOD 
NEGATIVE NITRITE NEGATIVE LEUK SCREEN TRACE CASTS/LPF NEGATIVE /LPFCRYSTALS 
NEGATIVE MUCOUS THRDS NEGATIVE BACTERIA NEGATIVE EPITH CELLS 3+++ SQUAMOUS /
HPFTRICHOMONAS NEGATIVE YEAST NEGATIVE 

 

 3/28/2016 2:18 PM  AFP Value 0.0240 ug/mLAFP MoM 1.02 hCG Value 26802.0 mIU/
mLhCG MoM 1.77 uE3 Value 0.70 ng/mLuE3 MoM 1.16 MARISA Value 207.520 pg/mLDIA MoM 
1.25 OSBR Risk       1 [place] [address] DSR (Second Trimester) 1IN 4435 DSR (By Age)    
1 IN 1003 T18 Risk Not increased T18 (By Age) 1:3907 

 

 2016 3:15 PM  WBC 11.5 RBC 3.75 HGB 11.70 g/dLHCT 35.50 %MCV 95.0 fLMCH 
31.20 pgMCHC 33.0 g/dLRDW CV 14.10 %MPV 9.70 fLPLT 273 %NEUT 73.0 %%LYMP 20.10 %
%MONO 5.70 %%EOS 0.80 %%BASO 0.10 %#NEUT 8.40 #LYMP 2.31 #MONO 0.65 #EOS 0.09 #
BASO 0.01 







History Of Immunizations







 Name  Date Admin  Mfg Name  Mfg Code  Trade Name  Lot#  Route  Inj  Vis Given  
Vis Pub  CVX

 

 HPV  2011  [employer]  MSD  GARDASIL  [id number]  Intramuscular  Left 
Deltoid  2011  999

 

 HPV  2011  [employer]  MSD  GARDASIL  [id number]  Intramuscular  Left 
Deltoid  2011  62

 

 Tdap  2016  [employer]  SKB  BOOSTRIX  [id number]  Intramuscular  Right 
Deltoid  2016  115







History of Past Illness







 Name  Date of Onset  Comments

 

 Atrial Septal Defect, Ostium Secundum Type      

 

 Prenatal Care, High Risk Pregnancy  2010   

 

    Apr 15 2010  4:29PM   

 

 Prenatal Care, High Risk Pregnancy  Apr 15 2010  4:13PM   

 

    2010  9:54AM   

 

 Prenatal Care, High Risk Pregnancy  2010 10:10AM   

 

    May 13 2010  9:01AM   

 

    John 10 2010  2:45PM   

 

    2010  4:16PM   

 

 Pregnancy, First Normal  Aug 30 2010  9:14AM   

 

 Pregnancy, High Risk  Sep 13 2010  3:13PM   

 

 Group B Strep Screening,   Oct 14 2010  5:43PM   

 

 Pregnancy, High Risk  Oct 14 2010  5:43PM   

 

 Postoperative Examination Following Surgery  Dec  6 2010  9:15AM   

 

 IMPLANON  Insertion  Dec 15 2010 10:44AM   

 

 General Medical Exam, Child  2011  8:58AM   

 

 Contraceptive Management  2011  8:58AM   

 

 Routine gynecological examination  Jul 15 2011 10:03AM   

 

 Contraceptive Counseling  Jul 15 2011 10:03AM   

 

 Vaginal Discharge  Jul 15 2011 10:03AM   

 

 Irregular Menses  Jul 15 2011 10:03AM   

 

 Gardsil (HPV)  2011  8:54AM   

 

 Irregular Menses  Aug 31 2011  9:07AM   

 

 Family Planning  Aug 31 2011  9:07AM   

 

 Contraceptive Management  Andreas  3 2012  9:43AM   

 

 Local Infection  Andreas  3 2012  9:43AM   

 

 Well Child Examination  2012  1:46PM   

 

 Right knee pain  2012  1:46PM   

 

 Right knee pain  2012  1:24PM   

 

 Abscess  2012  1:24PM   

 

 Right knee pain  Mar 26 2012 10:32AM   

 

 General Medical Exam, Adult  2012  1:46PM   

 

 Pregnancy test confirmed positive  2016 11:37AM   

 

 Obesity  2016 11:37AM   

 

 Normal pregnancy in multigravida in second trimester  Mar 28 2016  2:01PM   

 

 History of atrial septal defect repair  May 11 2016  8:50AM   

 

 Normal pregnancy in multigravida in second trimester  2016  2:00PM   

 

 Need for Tdap vaccine  2016  1:38PM   

 

 Group B Strep Screening,   Aug 23 2016  3:43PM   

 

 Normal pregnancy in multigravida in third trimester  Aug 23 2016  3:43PM   

 

 Small for gestational age fetus affecting management of mother in [place] 
pregnancy in third trimester  Sep  7 2016  4:50PM   

 

 Third trimester pregnancy  Sep  7 2016  3:20PM   

 

 Postoperative Examination Following Surgery  Sep 19 2016  9:26AM   







Payers







 Insurance Name  Company Name  Plan Name  Plan Number  Policy Number  Policy 
Group Number  Start Date

 

    [facility]HC Comm  
   [id number]     N/A

 

    [facility] Comm Plan of     
[id number]     N/A

 

    [place] [employer] [facility] Medical Assistance Prog     
[id number]     N/A

 

    zzzTest Medicare A  Test Medicare A     void     N/A







History of Encounters







 Visit Date  Visit Type  Provider

 

 2016  Office visit  Dr. Antonella Ventura MD

 

 2016  [facility]  Velma Mendieta APRN

 

 2016  [facility]  Dr. Antonella Ventura MD

 

 2016  Office visit  Megha Piedra DO

 

 2016  Office visit  Dr. Antonella Ventura MD

 

 2016  Office visit  Dr. Antonella Ventura MD

 

 2016  Office visit  Dr. Antonella Ventura MD

 

 2016  Office visit  Dr. Antonella Ventura MD

 

 2016  Office visit  Dr. Antonella Ventura MD

 

 2016  Office visit  Dr. Antonella Ventura MD

 

 3/28/2016  Office visit  Dr. Antonella Ventura MD

 

 2016  Office visit  Dr. Antonella Ventura MD

 

 2016  Office visit  Dr. Antonella Ventura MD

 

 2016  Office visit  Velma Mendieta APRN

 

 3/26/2012  Office visit  Ivanna Swan APRN

 

 2012  Office visit  Ivanna Swan APRN

 

 2012  Office visit  Ivanna Swan APRN

 

 1/3/2012  Office visit  Ivanna Swan APRN

 

 2011  Nurse visit  Ivanna Swan APRN

 

 2011  Nurse visit  Ivanna Swan APRN

 

 7/15/2011  Office visit  Ivanna Swan APRN

 

 2011  Office visit  Ivanna Swan APRN

 

 12/15/2010  Office visit  Willard Sorensen MD

 

 2010  Surgery  Willard Sorensen MD

 

 2010  Hospital  Willard Sorensen MD

 

 11/15/2010  Office visit  Willard Sorensne MD

 

 11/15/2010  [facility]  Willard Sorensen MD

 

 2010  Office visit  Willard Sorensen MD

 

 2010  Office visit  Willard Sorensen MD

 

 10/28/2010  Office visit  Willard Sorensen MD

 

 10/21/2010  Office visit  Willard Sorensen MD

 

 10/14/2010  Office visit  Willard Sorensen MD

 

 10/9/2010  [facility]  Willard Sorensen MD

 

 10/7/2010  Office visit  Willard Sorensen MD

 

 2010  Office visit  Willard Sorensen MD

 

 2010  Office visit  Willard Sorensen MD

 

 2010  Office visit  Willard Sorensen MD

 

 2010  Office visit  Willard Sorensen MD

 

 2010  Office visit  Willard Sorensen MD

 

 6/10/2010  Office visit  Willard Sorensen MD

 

 2010  Office visit  Willard Sorensen MD

 

 2010  Office visit  Willard Sorensen MD

 

 4/15/2010  Office visit  Willard Sorensen MD

## 2018-11-29 NOTE — XMS REPORT
MU2 Ambulatory Summary

 Created on: 2016



Radha Aviles

External Reference #: [id number]

: 1990

Sex: Female



Demographics







 Address  [address]

[place]  [address]

 

 Home Phone  [phone]

 

 Preferred Language  English

 

 Marital Status  Never 

 

 Jainism Affiliation  Unknown

 

 Race  White

 

 Ethnic Group  Not  or 





Author







 Author  Antonella Ventura

 

 [facility]

 

 Address  [address]

[place], [place]  [id number]



 

 Phone  [phone]







Care Team Providers







 Care Team Member Name  Role  Phone

 

 Antonella Ventura  PCP  Unavailable







Allergies and Adverse Reactions







 Name  Reaction  Notes

 

 NO KNOWN DRUG ALLERGIES      







Plan of Treatment

Not available.



Medications







  

 

 Name  Start Date  Expiration Date  SIG  Comments

 

 NataFort 60 mg iron-1 mg oral tablet  4/15/2010  3/11/2011  take 1 tablet by 
oral route once daily for 30 days   

 

 Diflucan 150 mg oral tablet  2010  take 1 tablet (150 mg) by 
oral route once  for 1 days   

 

 Premarin 1.25 mg oral tablet  2011  take 1 tablet by oral route 
daily for 10 days   

 

 doxycycline hyclate 100 mg oral tablet  7/15/2011  2011  take 1 tablet (
100 mg) by oral route every 12 hours for 10 days   

 

 ibuprofen 800 mg oral tablet  2012  take 1 tablet by oral route 
3 times a day for 30 days   

 

 Bactrim -160 mg oral tablet  2012  take 1 tablet by oral 
route every 12 hours for 7 days   

 

 Macrobid 100 mg oral capsule  2016  take 1 capsule (100 mg) by 
oral route 2 times per day with food for 7 days   









 Discontinued 

 

 Name  Start Date  Discontinued Date  SIG  Comments

 

 atenolol 25 mg oral tablet     2011  take 1/2 tablet by oral route QD   

 

 Medrol (Antwan) 4 mg oral tablets,dose pack  2012  3/26/2012  take as 
directed   







Problem List







 Description  Status  Onset

 

 Prenatal Care, High Risk Pregnancy  Active  2010







Vital Signs







 Date  Time  BP-Sys(mm[Hg]  BP-Marisa(mm[Hg])  HR(bpm)  RR(rpm)  Temp  WT  HT  HC  
BMI  BSA  BMI Percentile  O2 Sat(%)

 

 2016  11:15:00 AM  130 mmHg  70 mmHg  80 bpm  20 rpm     214 lbs  64 in  
   36.73 kg/m2  2.09 m2      

 

 3/26/2012  10:30:00 AM  126 mmHg  62 mmHg  106 bpm  18 rpm  96.8 F  205 lbs  
64 in     35.1878 kg/m  2.0491 m     97 %

 

 2012  1:22:00 PM  120 mmHg  68 mmHg  66 bpm  18 rpm  97 F  202.375 lbs  
64 in     34.74 kg/m2  2.04 m2      

 

 2012  1:44:00 PM  122 mmHg  64 mmHg  68 bpm  18 rpm  98.2 F  198.125 lbs  
64 in     34.0077 kg/m  2.0145 m      

 

 1/3/2012  9:40:00 AM  103 mmHg  77 mmHg  64 bpm  18 rpm  97.7 F  197.125 lbs  
64 in     33.84 kg/m2  2.01 m2      

 

 2011  9:06:00 AM  118 mmHg  68 mmHg  74 bpm     97.4 F  189 lbs          
        

 

 7/15/2011  10:06:00 AM  126 mmHg  70 mmHg  73 bpm  20 rpm  97.9 F  180.5 lbs  
64 in     30.98 kg/m2  1.92 m2  0 %  99 %

 

 2011  8:56:00 AM  96 mmHg  68 mmHg  66 bpm  20 rpm  97.5 F  177.5 lbs  64 
in     30.4675 kg/m  1.9067 m  0 %  98 %

 

 12/15/2010  10:43:00 AM  104 mmHg  64 mmHg  48 bpm     97.8 F  150 lbs        
          

 

 2010  9:15:00 AM  112 mmHg  72 mmHg  49 bpm     98 F  149 lbs  64 in     
25.5755 kg/m  1.747 m  81.5 %   

 

 2010  8:54:00 AM  117 mmHg  65 mmHg  73 bpm  20 rpm  97.8 F  160.5 lbs   
               

 

 2010  9:42:00 AM  134 mmHg  66 mmHg  66 bpm        161 lbs               
   

 

 4/15/2010  4:02:00 PM  135 mmHg  71 mmHg  77 bpm        162 lbs  64 in     
27.81 kg/m2  1.82 m2  89.9 %   







Social History







 Name  Description  Comments

 

 History of tobacco usage     Quit smoking in January - was smoking 1-2cig/day

 

 denies alcohol use      

 

 Tobacco  Former smoker   







History of Procedures







 Date Ordered  Description  Order Status

 

 2011 12:00 AM  URINE PREGNANCY TEST  Reviewed

 

 2011 12:00 AM  IMMUNIZATION ADMIN  Reviewed

 

 2011 12:00 AM  HPV VACCINE 4 VALENT IM  Reviewed

 

 2016 12:00 AM  CYTOPATH C/V THIN LAYER  Returned

 

 2016 12:00 AM  SPECIMEN HANDLING OFFICE-LAB  Reviewed

 

 2016 12:00 AM  N.GONORRHOEAE DNA AMP PROB  Returned

 

 2016 12:00 AM  CHLAMYDIA CULTURE  Returned

 

 2016 12:00 AM  HIV-1ANTIBODY  Returned

 

 2016 12:00 AM  URINALYSIS AUTO W/SCOPE  Returned

 

 2016 12:00 AM  OBSTETRIC PANEL  Returned

 

 2016 12:00 AM  GLUCOSE TOLERANCE TEST (GTT)  Returned

 

 3/28/2016 12:00 AM  ALPHA-FETOPROTEIN SERUM  Returned

 

 3/28/2016 12:00 AM  CHORIONIC GONADOTROPIN TEST  Returned

 

 3/28/2016 12:00 AM  CHORIONIC GONADOTROPIN ASSAY  Returned

 

 5/3/2016 12:00 AM  OB US >/=14 WKS SNGL FETUS  Returned

 

 2016 12:00 AM  RH IG FULL-DOSE IM  Returned

 

 2016 12:00 AM  Type and screen  Returned

 

 2016 12:00 AM  GLUCOSE TOLERANCE TEST (GTT)  Returned

 

 2016 12:00 AM  COMPLETE CBC W/AUTO DIFF WBC  Returned

 

 2012 12:00 AM  X-RAY EXAM KNEE 4 OR MORE  Returned

 

 2016 12:00 AM  TDAP VACCINE 7 YRS/> IM  Reviewed

 

 2016 12:00 AM  IMMUNIZATION ADMIN  Reviewed

 

 4/15/2010 12:00 AM  OBSTETRIC PANEL  Reviewed

 

 4/15/2010 12:00 AM  HIV-1ANTIBODY  Reviewed

 

 4/15/2010 12:00 AM  URINALYSIS NONAUTO W/SCOPE  Reviewed

 

 4/15/2010 12:00 AM  OB US < 14 WKS SINGLE FETUS  Reviewed

 

 2010 12:00 AM  GLUCOSE TEST  Reviewed

 

 2010 12:00 AM  Rhogam  Reviewed

 

 2010 12:00 AM  Type and screen  Reviewed

 

 2010 12:00 AM  COMPLETE CBC W/AUTO DIFF WBC  Reviewed

 

 2010 12:00 AM  COMPLETE CBC W/AUTO DIFF WBC  Reviewed

 

 10/14/2010 12:00 AM  CULTURE OTHR SPECIMN AEROBIC  Reviewed

 

 12/15/2010 12:00 AM  INSERTION IMPLANON, IMPLANTABLE CONTRACEPTIVE CAPSULES  
Reviewed

 

 12/15/2010 12:00 AM  Etonogestrel (contraceptive) implant system, (Implanon)  
Reviewed

 

 7/15/2011 12:00 AM  CYTOPATH TBS C/V MANUAL  Reviewed

 

 7/15/2011 12:00 AM  CHLAMYDIA CULTURE  Reviewed

 

 7/15/2011 12:00 AM  N.GONORRHOEAE DNA AMP PROB  Reviewed

 

 7/15/2011 12:00 AM  URINE PREGNANCY TEST  Reviewed

 

 2011 12:00 AM  IMMUNIZATION ADMIN  Reviewed

 

 2011 12:00 AM  HPV VACCINE 4 VALENT IM  Reviewed







Results Summary







 Data and Description  Results

 

 2010 10:41 AM  RUBELLA 34.0 IU/mLCOLOR YELLOW APPEARANCE HAZY SPEC GRAV 
1.025 pH 7.0 PROTEIN TRACE GLUCOSE NEGATIVE KETONE 15 BILIRUBIN NEGATIVE BLOOD 
NEGATIVE NITRITE NEGATIVE LEUK SCREEN NEGATIVE CASTS/LPF NEGATIVE CRYSTALS 
TRACE AMORPH MUCOUS THRDS FEW BACTERIA NEGATIVE EPITH CELLS FEW SQUAMOUS 
TRICHOMONAS NEGATIVE YEAST NEGATIVE WBC 6.3 RBC 4.57 HGB 13.90 g/dLHCT 40.30 %
MCV 88.0 fLMCH 30.40 pgMCHC 34.50 g/dLMPV 9.20 fLPLT 258 %NEUT 65.10 %%LYMP 
28.30 %#NEUT 4.10 #LYMP 1.80 

 

 2010 3:48 PM  WBC 12.9 RBC 4.02 HGB 12.60 g/dLHCT 37.40 %MCV 93.0 fLMCH 
31.30 pgMCHC 33.70 g/dLRDW CV 14.10 %MPV 9.50 fLPLT 263 %NEUT 69.20 %%LYMP 
22.30 %%MONO 7.60 %%EOS 0.70 %%BASO 0.20 %#NEUT 8.91 #LYMP 2.88 #MONO 0.98 #EOS 
0.09 #BASO 0.03 

 

 10/22/2010 8:21 PM  COLOR YELLOW APPEARANCE CLEAR SPEC GRAV 1.010 pH 6.5 
PROTEIN NEGATIVE GLUCOSE NEGATIVE KETONE 15 BILIRUBIN NEGATIVE BLOOD NEGATIVE 
NITRITE NEGATIVE LEUK SCREEN SMALL CASTS/LPF NEGATIVE CRYSTALS NEGATIVE MUCOUS 
THRDS NEGATIVE BACTERIA 2++ EPITH CELLS 3+++ SQUAMOUS TRICHOMONAS NEGATIVE 
YEAST NEGATIVE 

 

 2010 11:58 PM  COLOR YELLOW APPEARANCE CLEAR SPEC GRAV 1.015 pH 6.5 
PROTEIN NEGATIVE GLUCOSE NEGATIVE KETONE NEGATIVE BILIRUBIN NEGATIVE BLOOD 
NEGATIVE NITRITE NEGATIVE LEUK SCREEN LARGE CASTS/LPF NEGATIVE CRYSTALS 
NEGATIVE MUCOUS THRDS NEGATIVE BACTERIA 2++ EPITH CELLS 2++ SQUAMOUS 
TRICHOMONAS NEGATIVE YEAST NEGATIVE 

 

 11/15/2010 5:30 PM  .0 IU/LURIC ACID 3.7 mg/dLGLUCOSE 66.0 mg/dLSODIUM 
137.0 mmol/LPOTASSIUM 3.10 mmol/LCHLORIDE 105.0 mmol/LCO2 21.0 mmol/LBUN 3.0 mg/
dLCREATININE 0.50 mg/dLSGOT/AST 20.0 IU/LSGPT/ALT 18.0 IU/LALK PHOS 152.0 IU/
LTOTAL PROTEIN 6.20 g/dLALBUMIN 3.40 g/dLTOTAL BILI 0.60 mg/dLCALCIUM 9.10 mg/
dLeGFR >60 mL/min/1.73 m2WBC 16.9 RBC 4.18 HGB 13.30 g/dLHCT 39.70 %MCV 95.0 
fLMCH 31.80 pgMCHC 33.50 g/dLRDW CV 14.0 %MPV 10.10 fLPLT 268 %NEUT 75.70 %%
LYMP 17.50 %%MONO 6.20 %%EOS 0.40 %%BASO 0.20 %#NEUT 12.82 #LYMP 2.97 #MONO 
1.05 #EOS 0.06 #BASO 0.03 

 

 11/15/2010 8:50 PM  PROTEIN UR 14.0 mg/dL

 

 2016 12:21 PM  Pap Smear Collected 

 

 2016 2:00 PM  WBC 9.8 RBC 4.60 HGB 13.60 g/dLHCT 41.40 %MCV 90.0 fLMCH 
29.60 pgMCHC 32.90 g/dLRDW CV 13.80 %MPV 9.70 fLPLT 271 %NEUT 59.80 %%LYMP 33.0 
%%MONO 6.40 %%EOS 0.60 %%BASO 0.20 %#NEUT 5.83 #LYMP 3.22 #MONO 0.62 #EOS 0.06 #
BASO 0.02 HIV AG/AB COMBO 0.14 RPR Non Reactive Rubella Antibodies, IgG 2.88 
Index

 

 2016 2:08 PM  COLOR YELLOW APPEARANCE CLEAR SPEC GRAV <=1.005 pH 6.0 
PROTEIN NEGATIVE GLUCOSE NEGATIVE mg/dLKETONE NEGATIVE BILIRUBIN NEGATIVE BLOOD 
NEGATIVE NITRITE NEGATIVE LEUK SCREEN TRACE CASTS/LPF NEGATIVE /LPFCRYSTALS 
NEGATIVE MUCOUS THRDS NEGATIVE BACTERIA NEGATIVE EPITH CELLS 3+++ SQUAMOUS /
HPFTRICHOMONAS NEGATIVE YEAST NEGATIVE 

 

 3/28/2016 2:18 PM  AFP Value 0.0240 ug/mLAFP MoM 1.02 hCG Value 10901.0 mIU/
mLhCG MoM 1.77 uE3 Value 0.70 ng/mLuE3 MoM 1.16 MARISA Value 207.520 pg/mLDIA MoM 
1.25 OSBR Risk       1 [place] [address] DSR (Second Trimester) 1IN 4435 DSR (By Age)    
1 IN 1003 T18 Risk Not increased T18 (By Age) 1:3907 

 

 2016 3:15 PM  WBC 11.5 RBC 3.75 HGB 11.70 g/dLHCT 35.50 %MCV 95.0 fLMCH 
31.20 pgMCHC 33.0 g/dLRDW CV 14.10 %MPV 9.70 fLPLT 273 %NEUT 73.0 %%LYMP 20.10 %
%MONO 5.70 %%EOS 0.80 %%BASO 0.10 %#NEUT 8.40 #LYMP 2.31 #MONO 0.65 #EOS 0.09 #
BASO 0.01 







History Of Immunizations







 Name  Date Admin  Mfg Name  Mfg Code  Trade Name  Lot#  Route  Inj  Vis Given  
Vis Pub  CVX

 

 HPV  2011  [employer]  MSD  GARDASIL  [id number]  Intramuscular  Left 
Deltoid  2011  999

 

 HPV  2011  [employer]  MSD  GARDASIL  [id number]  Intramuscular  Left 
Deltoid  2011  62

 

 Tdap  2016  GlaxWellnteline  SKB  BOOSTRIX  [id number]  Intramuscular  Right 
Deltoid  2016  115







History of Past Illness







 Name  Date of Onset  Comments

 

 Atrial Septal Defect, Ostium Secundum Type      

 

 Prenatal Care, High Risk Pregnancy  2010   

 

    Apr 15 2010  4:29PM   

 

 Prenatal Care, High Risk Pregnancy  Apr 15 2010  4:13PM   

 

    2010  9:54AM   

 

 Prenatal Care, High Risk Pregnancy  2010 10:10AM   

 

    May 13 2010  9:01AM   

 

    John 10 2010  2:45PM   

 

    2010  4:16PM   

 

 Pregnancy, First Normal  Aug 30 2010  9:14AM   

 

 Pregnancy, High Risk  Sep 13 2010  3:13PM   

 

 Group B Strep Screening,   Oct 14 2010  5:43PM   

 

 Pregnancy, High Risk  Oct 14 2010  5:43PM   

 

 Postoperative Examination Following Surgery  Dec  6 2010  9:15AM   

 

 IMPLANON  Insertion  Dec 15 2010 10:44AM   

 

 General Medical Exam, Child  2011  8:58AM   

 

 Contraceptive Management  2011  8:58AM   

 

 Routine gynecological examination  Jul 15 2011 10:03AM   

 

 Contraceptive Counseling  Jul 15 2011 10:03AM   

 

 Vaginal Discharge  Jul 15 2011 10:03AM   

 

 Irregular Menses  Jul 15 2011 10:03AM   

 

 Gardsil (HPV)  2011  8:54AM   

 

 Irregular Menses  Aug 31 2011  9:07AM   

 

 Family Planning  Aug 31 2011  9:07AM   

 

 Contraceptive Management  Andreas  3 2012  9:43AM   

 

 Local Infection  Andreas  3 2012  9:43AM   

 

 Well Child Examination  2012  1:46PM   

 

 Right knee pain  2012  1:46PM   

 

 Right knee pain  2012  1:24PM   

 

 Abscess  2012  1:24PM   

 

 Right knee pain  Mar 26 2012 10:32AM   

 

 General Medical Exam, Adult  2012  1:46PM   

 

 Pregnancy test confirmed positive  2016 11:37AM   

 

 Obesity  2016 11:37AM   

 

 Normal pregnancy in multigravida in second trimester  Mar 28 2016  2:01PM   

 

 History of atrial septal defect repair  May 11 2016  8:50AM   

 

 Normal pregnancy in multigravida in second trimester  2016  2:00PM   

 

 Need for Tdap vaccine  2016  1:38PM   







Payers







 Insurance Name  Company Name  Plan Name  Plan Number  Policy Number  Policy 
Group Number  Start Date

 

    [facility] Plan of [facility]  
   [id number]     N/A

 

    [facility]  [employer]     
[id number]     N/A

 

    zzzTest Medicare A  Test Medicare A     void     N/A







History of Encounters







 Visit Date  Visit Type  Provider

 

 2016  Office visit  Dr. Antonella Ventura MD

 

 2016  Office visit  Dr. Antonella Ventura MD

 

 2016  Office visit  Dr. Antonella Ventura MD

 

 2016  Office visit  Dr. Antonella Ventura MD

 

 3/28/2016  Office visit  Dr. Antonella Ventura MD

 

 2016  Office visit  Dr. Antonella Ventura MD

 

 2016  Office visit  Dr. Antonella Ventura MD

 

 2016  Office visit  Velma MAGDACynthia Mendieta APRN

 

 3/26/2012  Office visit  Ivanna Swan APRN

 

 2012  Office visit  Ivanna Swan APRN

 

 2012  Office visit  Ivanna Swan APRN

 

 1/3/2012  Office visit  Ivanna Swan APRN

 

 2011  Nurse visit  Ivanna Swan APRN

 

 2011  Nurse visit  Ivanna Swan APRN

 

 7/15/2011  Office visit  Ivanna Swan APRN

 

 2011  Office visit  Ivanna Swan APRN

 

 12/15/2010  Office visit  Willard Sorensen MD

 

 2010  Surgery  Willard Sorensen MD

 

 2010  Hospital  Willard Sorensen MD

 

 11/15/2010  Office visit  Willard Sorensen MD

 

 11/15/2010  [facility]  Willard Sorensen MD

 

 2010  Office visit  Willard Sorensen MD

 

 2010  Office visit  Willard Sorensen MD

 

 10/28/2010  Office visit  Willard Sorensen MD

 

 10/21/2010  Office visit  Willard Sorensen MD

 

 10/14/2010  Office visit  Willard Sorensen MD

 

 10/9/2010  [facility]  Willard Sorensen MD

 

 10/7/2010  Office visit  Willard Sorensen MD

 

 2010  Office visit  Willard Sorensen MD

 

 2010  Office visit  Willard Sorensen MD

 

 2010  Office visit  Willard Sorensen MD

 

 2010  Office visit  Willard Sorensen MD

 

 2010  Office visit  Willard Sorensen MD

 

 6/10/2010  Office visit  Willard Sorensen MD

 

 2010  Office visit  Willard Sorensen MD

 

 2010  Office visit  Willard Sorensen MD

 

 4/15/2010  Office visit  Willard Sorensen MD
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
## 2018-11-29 NOTE — XMS REPORT
Continuity of Care Document

 Created on: 2015



RADHA AVILES

External Reference #: [id number]

: 1990

Sex: Female



Demographics







 Address   [address]  [address]

 

 Home Phone  [phone]

 

 Preferred Language  Unknown

 

 Marital Status  

 

 [employer] Affiliation  Unknown

 

 Race  White

 

 Ethnic Group  Unknown





Author







 Author  [facility]

 

 Organization  [facility]

 

 Address  [facility]

 [address]

[place]  [address]



 

 Phone  Unavailable







Support







 Name  Relationship  Address  Phone

 

 LEONIDES BROWN M.D.  Caregiver  [address] [place]  [address] [phone]

 

 LUZ SENA MD  Caregiver  [address] [place], [place]    [phone]

 

 KAMALJIT AVILES  Next Of Kin   [address]  [address] [phone]







Insurance Providers







 Payer Name  Policy Number  Subscriber Name  Relationship

 

 [place]  [id number]  Radha Aviles  19 Self / Same As Patient







Advance Directives







 Directive  Response  Recorded Date/Time

 

 Advance Directives  No  02/20/15 7:18am

 

 Living Will  No  02/20/15 7:18am

 

 Health Care Proxy  No  02/22/15 10:14am

 

 Power of  for Health Care  No  02/20/15 7:18am

 

 Who is designated as your agent/rep. to act on your behalf?  PT  02/22/15 10:
14am

 

 Organ, Tissue, or Eye Donor  No  02/20/15 7:17am

 

 Do you have a signed organ donor card?  No  02/20/15 7:17am







Problems

Medical Problems





 Problem  Onset Date  Status

 

 Spontaneous   Unknown  Active

 

 UTI (lower urinary tract infection)  Unknown  Active

 

 Spontaneous   Unknown  Active

 

 Spontaneous   Unknown  Active







Medications







 Medication  Dose  Route  Sig  Days/Qty  Instructions  Order Date  Discontinued 
Date  Status

 

 Folic Acid  1 Mg  PO  DAILY        02/20/15     Active

 

 Pnv With Ca,No.74/Iron/Fa  1 Each  PO           02/20/15     Active

 

 Cephalexin Monohydrate  1,000 Mg  PO  TWICE A DAY  40 Qty     02/20/15     
Active

 

 Acetaminophen/ Codeine #3 Tab*  1 Ea  PO  EVERY 6 HRS AS NEEDED FOR PAIN For 
PAIN  15 Qty     02/22/15     Active







Social History







 Social History Problem  Response  Recorded Date/Time

 

 Smoking Status  Never smoker  2015 8:06am

 

 Alcohol Use  none  2015 11:18am

 

 Drug Use  none  2015 11:18am









 Query  Response  Start Date  Stop Date

 

 Smoking Status  Never smoker      







Hospital Discharge Instructions

No hospital discharge instructions.



Plan of Care

No plan of care.



Functional Status







 Query  Response  Date Recorded

 

 [place] Coma Scale Total  15

  2015 12:15pm

 

 Patient Behavior  Fatigued

  2015 12:15pm







Allergies, Adverse Reactions, Alerts







 Allergen  Type  Severity  Reaction  Status  Last Updated

 

 NO KNOWN ALLERGIES           Active  02/22/15







Immunizations







 Name  Given  Type

 

 Hx Diphtheria, Pertussis, Tetanus Vaccination  Unknown  Historical

 

 Hx Influenza Vaccination  Y   Historical

 

 Hx Pneumococcal Vaccination  No  Historical







Vital Signs

Acute Vital Signs





 Vital  Response  Date/Time

 

 Temperature (Fahrenheit)  97.7 degrees F (97.6 - 99.5)   

 

 Temperature Source  Temporal Artery     

 

 Pulse Rate (adult)  72 bpm (60 - 90)   

 

 Respiratory Rate  20 bpm (12 - 24)   

 

 Blood Pressure  117/59 mm Hg   

 

 Blood Pressure  116/63 mm Hg   

 

 O2 Sat by Pulse Oximetry  97 % (90 - 100)   

 

 Oxygen Delivery Method      

 

 Pain Intensity  4     

 

 Pain Location Body Site Modifier  Medial     

 

 Pain Description      

 

 Pain Duration  3-6 Hours     

 

 Height  5 ft 4 in    

 

 Weight  176 lb    

 

 Body Mass Index  30.0 kg/m^2    







Results







 Test  Source  Date  Result  Interp.  Ref. Range  Comments

 

 Alanine Aminotransferase (ALT/SGPT)     2015 7:55am  16 U/L  N  12
-78   

 

 Albumin     2015 7:55am  3.6 gm/dL  N  3.4-5.0   

 

 Aspartate Amino Transf (AST/SGOT)     2015 7:55am  13 U/L  L  15-
37   

 

 Basophils # (Auto)     2015 9:55am  0.1 K/uL  N  0.0-0.2   

 

 Basophils (%) (Auto)     2015 9:55am  0.8 %  N  0.0-1.0   

 

 Blood Urea Nitrogen     2015 7:55am  5 mg/dL  L  7-18   

 

 Calcium Level     2015 7:55am  9.0 mg/dL  N  8.8-10.5   

 

 Carbon Dioxide Level     2015 7:55am  24.6 mEq/L  N  21-32   

 

 Chloride Level     2015 7:55am  103 mEq/L  N     

 

 Creatinine     2015 7:55am  0.6 mg/dL  N  0.6-1.0   

 

 Eosinophils # (Auto)     2015 9:55am  0.1 K/uL  N  0.0-0.7   

 

 Eosinophils (%) (Auto)     2015 9:55am  0.7 %  N  0.0-2.0   

 

 Glomerular Filtration Rate Calc     2015 7:55am  130.5 mL/min  H  
60.0-128.0   

 

 HCG Beta Subunit     2015 7:55am  1353 IU/L     -  9-130 mIU/ml 
INDICATES 3-4 WEEKS POST LMP75-2,600 mIU/ml INDICATES 4-5 WEEKS POST LMP

 850-20,800 mIU/ml INDICATES 5-6 WEEKS POST LMP

 4,000-100,200 mIU/ml INDICATES 6-7 WEEKS POST LMP

 11,500-289,000 mIU/ml INDICATES 7-12 WEEKS POST LMP

 18,000-137,000 mIU/ml INDICATES 12-16 WEEKS POST LMP

 1,400-53,000 mIU/ml INDICATES 16-29 WEEKS POST LMP

      (2ND TRIMESTER)

 940-60,000 mIU/ML INDICATES 29-41 WEEKS

      (3RD TRIMESTER)

 

 Hematocrit     2015 9:55am  41.8 %  N  37.0-47.0   

 

 Hemoglobin     2015 9:55am  14.4 gm/dL  N  12.0-16.0   

 

 Lymphocytes # (Auto)     2015 9:55am  2.9 K/uL  N  1.2-3.4   

 

 Lymphocytes (%) (Auto)     2015 9:55am  20.9 %  N  20.5-51.1   

 

 Mean Corpuscular Hemoglobin     2015 9:55am  29.0 pg  N  27.0-
31.0   

 

 Mean Corpuscular Hemoglobin Concent     2015 9:55am  34.4 g/dL  N
  30.0-37.0   

 

 Mean Corpuscular Volume     2015 9:55am  84.2 fL  N  81.0-99.0   

 

 Mean Platelet Volume     2015 9:55am  6.5 fL  L  7.4-10.4   

 

 Monocytes # (Auto)     2015 9:55am  0.7 K/uL  H  0.1-0.6   

 

 Monocytes (%) (Auto)     2015 9:55am  5.4 %  N  1.7-9.3   

 

 Neutrophils # (Auto)     2015 9:55am  10.0 K/uL  H  2.0-6.9   

 

 Neutrophils (%) (Auto)     2015 9:55am  72.2 %  N  42.2-75.2   

 

 Platelet Count     2015 9:55am  238 K/uL  N  130-400   

 

 Potassium Level     2015 7:55am  3.5 mEq/L  N  3.5-5.0   

 

 Random Glucose     2015 7:55am  80 mg/dL  N     

 

 Red Blood Count     2015 9:55am  4.97 M/uL  N  4.20-5.40   

 

 Red Cell Distribution Width     2015 9:55am  12.3 %  N  11.5-14.5
   

 

 Sodium Level     2015 7:55am  140 mEq/L  N  136-145   

 

 Total Alkaline Phosphatase     2015 7:55am  105 U/L  N     

 

 Total Bilirubin     2015 7:55am  0.70 mg/dL  N  0.00-1.00   

 

 Total Protein     2015 7:55am  8.0 gm/dL  N  6.4-8.2   

 

 Urine Appearance     2015 8:39am  Clear     -  Urine obtained via 
URINE, CLEAN CATCH

 

 Urine Bacteria     2015 8:39am  2+  H  -  A CULTURE HAS BEEN 
ADDED TO THIS SPECIMEN PER PROTOCOL

 

 Urine Bilirubin     2015 8:39am  Negative     -  Urine obtained 
via URINE, CLEAN CATCH

 

 Urine Color     2015 8:39am  Lt. yellow     -  Urine obtained via 
URINE, CLEAN CATCH

 

 Urine Glucose (UA)     2015 8:39am  Negative mg/dL     -  Urine 
obtained via URINE, CLEAN CATCH

 

 Urine HCG, Qualitative     2015 8:39am  Positive     -  Urine 
obtained via URINE, CLEAN CATCH

 

 Urine Ketones     2015 8:39am  1+ (15 mg/dl) mg/dL  H  -  Urine 
obtained via URINE, CLEAN CATCH

 

 Urine Leukocyte Esterase     2015 8:39am  1+ (small)  H  -  Urine 
obtained via URINE, CLEAN CATCH

 

 Urine Nitrate     2015 8:39am  Negative     -  Urine obtained via 
URINE, CLEAN CATCH

 

 Urine Occult Blood     2015 8:39am  3+ (large)  H  -  URINE 
CULTURE ORDERED PER MEDICAL STAFF-APPROVED PROTOCOLFOR LAB.

 

 Urine Protein     2015 8:39am  Negative mg/dL     -  Urine 
obtained via URINE, CLEAN CATCH

 

 Urine RBC     2015 8:39am  15-19 /hpf  H  -  Urine obtained via 
URINE, CLEAN CATCH

 

 Urine Specific Gravity     2015 8:39am  1.020  N  1.010-1.025  
Urine obtained via URINE, CLEAN CATCH

 

 Urine Sperm     2015 8:39am  Few  H  -  Urine obtained via URINE, 
CLEAN CATCH

 

 Urine Squamous Epithelial Cells     2015 8:39am  2-4 /hpf  H  -  
Urine obtained via URINE, CLEAN CATCH

 

 Urine Urobilinogen     2015 8:39am  1.0 mg/dl E.U./dL     -  
Urine obtained via URINE, CLEAN CATCH

 

 Urine WBC     2015 8:39am  5-9 /hpf  H  -  A CULTURE HAS BEEN 
ADDED TO THIS SPECIMEN PER PROTOCOL

 

 Urine pH     2015 8:39am  7.0     -  Urine obtained via URINE, 
CLEAN CATCH

 

 White Blood Count     2015 9:55am  13.9 K/uL  H  4.8-10.8   

 

 Wet Prep  Vaginal  2015 9:06am            

 

 Urine Culture  Urine,Clean Catch  2015 8:39am            







Procedures







 Procedure  Status  Date  Provider(s)

 

 Dilation and curettage of uterus using suction  completed  02/22/15  LEONIDES BROWN M.D.

 

 Limited obstetrical ultrasound  completed  02/20/15  ROMMEL ANN DO







Encounters







 Encounter  Location  Date/Time

 

 Departed Emergency Room  [facility]  02/22/15 10:13am

 

 Departed Emergency Room  [facility]  02/20/15 7:21am











 Recent Diagnosis

## 2018-11-29 NOTE — XMS REPORT
Continuity of Care Document

 Created on: 2018



Radha Aviles

External Reference #: [id number]

: 1990

Sex: Female



Demographics







 Address  [address] [place] [address] [address]

[place]  [address]

 

 Home Phone  [phone] x

 

 Preferred Language  Unknown

 

 Marital Status  Unknown

 

 Pent[employer]al Affiliation  Unknown

 

 Race  Unknown

 

 Ethnic Group  Unknown





Author







 Author  [facility]

 

 Organization  [facility]

 

 Address  Unknown

 

 Phone  Unavailable



              



Allergies

      





 Active            Description            Code            Type            
Severity            Reaction            Onset            Reported/Identified   
         Relationship to Patient            Clinical Status        

 

 Yes            NKDA                                      N/A            N/A   
                                                         

 

 Yes            No Known Medication Allergies                         NKMA     
       N/A            N/A                                                      
      

 

 Yes            No Known Drug Allergies            [id number]            Drug 
Allergy            Unknown            N/A                         2018   
                               



                      



Medications

      





 Medication            Packaging            Start Date            Stop Date    
        Route            Dosage            Sig        

 

             MELOXICAM                                   2016            
             ORAL            7                        daily                  



                  



Problems

      





 Date Dx Coded            Attending            Type            Code            
Diagnosis            Diagnosed By        

 

 2016            Lee Villarreal            O34.21     
       Maternal care for scar from previous  delivery                  
   

 

 2016            Lee Villarreal            Final            Z82.49     
       Family history of ischemic heart disease and other diseases of the 
circulatory system                     

 

 2018            MELVIN IVEY MD            Ot            N20.1      
      CALCULUS OF URETER                     

 

 2018            MELVIN IVEY MD            Ot            R10.31     
       RIGHT LOWER QUADRANT PAIN                     

 

 2018            MELVIN IVEY MD            Ot            Z87.59     
       PERSONAL HISTORY OF COMP OF PREG, CHLDBR                     

 

 2018            MELVIN IVEY MD, Ot            N20.1      
      CALCULUS OF URETER                     

 

 2018            MELVIN IVEY MD            Ot            R10.31     
       RIGHT LOWER QUADRANT PAIN                     

 

 2018            MELVIN IVEY MD, Ot            Z87.59     
       PERSONAL HISTORY OF COMP OF PREG, CHLDBR                     



                                



Procedures

      





 Code            Description            Performed By            Performed On   
     

 

             [id number]                                  Office or other outpatient 
visit for the evaluation and management of a new patient, which requires these 
3 key components: A problem focused history; A problem focused examination; 
Straightforward me                                   2016        



                  



Results

      





 Test            Result            Range        









 Complete urinalysis with reflex to culture - 18 09:25         









 Urine color determination            YELLOW             NRG        

 

 Urine clarity determination            CLEAR             NRG        

 

 Urine pH measurement by test strip            5             5-9        

 

 Specific gravity of urine by test strip            1.030             1.016-
1.022        

 

 Urine protein assay by test strip, semi-quantitative            2+             
NEGATIVE        

 

 Urine glucose detection by automated test strip            NEGATIVE           
  NEGATIVE        

 

 Erythrocytes detection in urine sediment by light microscopy            5+    
         NEGATIVE        

 

 Urine ketones detection by automated test strip            4+             
NEGATIVE        

 

 Urine nitrite detection by test strip            NEGATIVE             NEGATIVE
        

 

 Urine total bilirubin detection by test strip            1+             
NEGATIVE        

 

 Urine urobilinogen measurement by automated test strip (mass/volume)          
  NORMAL             NORMAL        

 

 Urine leukocyte esterase detection by dipstick            2+             
NEGATIVE        

 

 Automated urine sediment erythrocyte count by microscopy (number/high power 
field)             [HPF]            NRG        

 

 Automated urine sediment leukocyte count by microscopy (number/high power field
)             [HPF]            NRG        

 

 Bacteria detection in urine sediment by light microscopy            MODERATE  
           NRG        

 

 Squamous epithelial cells detection in urine sediment by light microscopy     
       10-25             NRG        

 

 Crystals detection in urine sediment by light microscopy            NONE      
       NRG        

 

 Casts detection in urine sediment by light microscopy            NONE         
    NRG        

 

 Mucus detection in urine sediment by light microscopy            SMALL        
     NRG        

 

 Complete urinalysis with reflex to culture            NO             NRG      
  









 Complete blood count (CBC) with automated white blood cell (WBC) differential 
- 18 09:45         









 Blood leukocytes automated count (number/volume)            9.8 10*3/uL       
     4.3-11.0        

 

 Blood erythrocytes automated count (number/volume)            4.61 10*6/uL    
        4.35-5.85        

 

 Venous blood hemoglobin measurement (mass/volume)            13.7 g/dL        
    11.5-16.0        

 

 Blood hematocrit (volume fraction)            40 %            35-52        

 

 Automated erythrocyte mean corpuscular volume            87 [foz_us]          
  80-99        

 

 Automated erythrocyte mean corpuscular hemoglobin (mass per erythrocyte)      
      30 pg            25-34        

 

 Automated erythrocyte mean corpuscular hemoglobin concentration measurement (
mass/volume)            34 g/dL            32-36        

 

 Automated erythrocyte distribution width ratio            13.2 %            
10.0-14.5        

 

 Automated blood platelet count (count/volume)            287 10*3/uL          
  130-400        

 

 Automated blood platelet mean volume measurement            9.6 [foz_us]      
      7.4-10.4        

 

 Automated blood neutrophils/100 leukocytes            80 %            42-75   
     

 

 Automated blood lymphocytes/100 leukocytes            15 %            12-44   
     

 

 Blood monocytes/100 leukocytes            5 %            0-12        

 

 Automated blood eosinophils/100 leukocytes            0 %            0-10     
   

 

 Automated blood basophils/100 leukocytes            0 %            0-10        

 

 Blood neutrophils automated count (number/volume)            7.8 10*3         
   1.8-7.8        

 

 Blood lymphocytes automated count (number/volume)            1.5 10*3         
   1.0-4.0        

 

 Blood monocytes automated count (number/volume)            0.5 10*3            
0.0-1.0        

 

 Automated eosinophil count            0.0 10*3/uL            0.0-0.3        

 

 Automated blood basophil count (count/volume)            0.0 10*3/uL          
  0.0-0.1        









 Comprehensive metabolic panel - 18 10:34         









 Serum or plasma sodium measurement (moles/volume)            138 mmol/L       
     135-145        

 

 Serum or plasma potassium measurement (moles/volume)            3.7 mmol/L    
        3.6-5.0        

 

 Serum or plasma chloride measurement (moles/volume)            107 mmol/L     
               

 

 Carbon dioxide            19 mmol/L            21-32        

 

 Serum or plasma anion gap determination (moles/volume)            12 mmol/L   
         5-14        

 

 Serum or plasma urea nitrogen measurement (mass/volume)            12 mg/dL   
         7-18        

 

 Serum or plasma creatinine measurement (mass/volume)            0.71 mg/dL    
        0.60-1.30        

 

 Serum or plasma urea nitrogen/creatinine mass ratio            17             
NRG        

 

 Serum or plasma creatinine measurement with calculation of estimated 
glomerular filtration rate            >             NRG        

 

 Serum or plasma glucose measurement (mass/volume)            130 mg/dL        
            

 

 Serum or plasma calcium measurement (mass/volume)            9.1 mg/dL        
    8.5-10.1        

 

 Serum or plasma total bilirubin measurement (mass/volume)            0.9 mg/dL
            0.1-1.0        

 

 Serum or plasma alkaline phosphatase measurement (enzymatic activity/volume)  
          54 U/L                    

 

 Serum or plasma aspartate aminotransferase measurement (enzymatic activity/
volume)            14 U/L            5-34        

 

 Serum or plasma alanine aminotransferase measurement (enzymatic activity/volume
)            11 U/L            0-55        

 

 Serum or plasma protein measurement (mass/volume)            7.1 g/dL         
   6.4-8.2        

 

 Serum or plasma albumin measurement (mass/volume)            4.0 g/dL         
   3.2-4.5        



                    



Encounters

      





 ACCT No.            Visit Date/Time            Discharge            Status    
        Pt. Type            Provider            Facility            Loc./Unit  
          Complaint        

 

 [id number]            10/24/2016 11:05:22            10/24/2016 23:59:59          
  [facility]            Outpatient            Antonella Ventura                      
                         

 

 [id number]            2016 09:47:08            2016 23:59:59          
  [facility]            Outpatient            Antonella Ventura                      
                         

 

 [id number]            2016 10:17:06            2016 23:59:59          
  [facility]            Outpatient            Velma Mendieta                   
                            

 

 [id number]            2016 10:14:44            2016 23:59:59          
  [facility]            Outpatient            Antonella Ventura                      
                         

 

 [id number]            2016 16:09:03            2016 23:59:59          
  [facility]            Outpatient            Megha Bloom                          
                     

 

 [id number]            2016 14:48:56            2016 23:59:59          
  [facility]            Outpatient            Antonella Ventura                      
                         

 

 [id number]            2016 16:20:08            2016 23:59:59          
  [facility]            Outpatient            Antonella Ventura                      
                         

 

 [id number]            2016 14:42:16            2016 23:59:59          
  [facility]            Outpatient            Antonella Ventura                      
                         

 

 [id number]            2016 14:05:02            2016 23:59:59          
  [facility]            Outpatient            Antonella Ventura                      
                         

 

 [id number]            2016 20:22:56            2016 23:59:59          
  [facility]            Outpatient            Antonella Ventura                      
                         

 

 [id number]            2016 14:32:00            2016 23:59:59          
  [facility]            Outpatient            Antonella Ventura                      
                         

 

 [id number]            2016 09:29:10            2016 23:59:59          
  [facility]            Outpatient            Antonella Ventura                      
                         

 

 [id number]            2016 09:41:17            2016 23:59:59          
  [facility]            Outpatient            Antonella Ventura                      
                         

 

 [id number]            2016 12:00:02            2016 23:59:59          
  [facility]            Outpatient            Velma Mendieta                   
                            

 

 [id number]            2018 08:36:00            2018 12:03:00    
        [facility]            Emergency            UCHE VAUGHN, MELVIN DE LA ROSA            Via 
[facility]            ER            LOWER ABD PAIN        

 

 [id number]            2016 16:12:00            2016 23:59:00    
        [facility]            Outpatient            Lee Villarreal            Via 
[facility] [address] [facility]            npv fetal echo due to 
maternal hx of atrial septal defect lm        

 

 [id number]            2016 10:42:00                                      
Document Registration

## 2018-11-29 NOTE — XMS REPORT
MU2 Ambulatory Summary

 Created on: 2016



Radha Aviles

External Reference #: [id number]

: 1990

Sex: Female



Demographics







 Address  [address]

[place]  [address]

 

 Home Phone  [phone]

 

 Preferred Language  English

 

 Marital Status  Never 

 

 Latter day Affiliation  Unknown

 

 Race  White

 

 Ethnic Group  Not  or 





Author







 Author  Antonella Ventura

 

 [facility]

 

 Address  [address]

[place], [place]  [id number]



 

 Phone  [phone]







Care Team Providers







 Care Team Member Name  Role  Phone

 

 Antonella Ventura  PCP  Unavailable







Allergies and Adverse Reactions







 Name  Reaction  Notes

 

 NO KNOWN DRUG ALLERGIES      







Plan of Treatment

Not available.



Medications







  

 

 Name  Start Date  Expiration Date  SIG  Comments

 

 NataFort 60 mg iron-1 mg oral tablet  4/15/2010  3/11/2011  take 1 tablet by 
oral route once daily for 30 days   

 

 Diflucan 150 mg oral tablet  2010  take 1 tablet (150 mg) by 
oral route once  for 1 days   

 

 Premarin 1.25 mg oral tablet  2011  take 1 tablet by oral route 
daily for 10 days   

 

 doxycycline hyclate 100 mg oral tablet  7/15/2011  2011  take 1 tablet (
100 mg) by oral route every 12 hours for 10 days   

 

 ibuprofen 800 mg oral tablet  2012  take 1 tablet by oral route 
3 times a day for 30 days   

 

 Bactrim -160 mg oral tablet  2012  take 1 tablet by oral 
route every 12 hours for 7 days   

 

 Macrobid 100 mg oral capsule  2016  take 1 capsule (100 mg) by 
oral route 2 times per day with food for 7 days   









 Discontinued 

 

 Name  Start Date  Discontinued Date  SIG  Comments

 

 atenolol 25 mg oral tablet     2011  take 1/2 tablet by oral route QD   

 

 Medrol (Antwan) 4 mg oral tablets,dose pack  2012  3/26/2012  take as 
directed   







Problem List







 Description  Status  Onset

 

 Prenatal Care, High Risk Pregnancy  Active  2010







Vital Signs







 Date  Time  BP-Sys(mm[Hg]  BP-Marisa(mm[Hg])  HR(bpm)  RR(rpm)  Temp  WT  HT  HC  
BMI  BSA  BMI Percentile  O2 Sat(%)

 

 2016  11:15:00 AM  130 mmHg  70 mmHg  80 bpm  20 rpm     214 lbs  64 in  
   36.73 kg/m2  2.09 m2      

 

 3/26/2012  10:30:00 AM  126 mmHg  62 mmHg  106 bpm  18 rpm  96.8 F  205 lbs  
64 in     35.1878 kg/m  2.0491 m     97 %

 

 2012  1:22:00 PM  120 mmHg  68 mmHg  66 bpm  18 rpm  97 F  202.375 lbs  
64 in     34.74 kg/m2  2.04 m2      

 

 2012  1:44:00 PM  122 mmHg  64 mmHg  68 bpm  18 rpm  98.2 F  198.125 lbs  
64 in     34.0077 kg/m  2.0145 m      

 

 1/3/2012  9:40:00 AM  103 mmHg  77 mmHg  64 bpm  18 rpm  97.7 F  197.125 lbs  
64 in     33.84 kg/m2  2.01 m2      

 

 2011  9:06:00 AM  118 mmHg  68 mmHg  74 bpm     97.4 F  189 lbs          
        

 

 7/15/2011  10:06:00 AM  126 mmHg  70 mmHg  73 bpm  20 rpm  97.9 F  180.5 lbs  
64 in     30.98 kg/m2  1.92 m2  0 %  99 %

 

 2011  8:56:00 AM  96 mmHg  68 mmHg  66 bpm  20 rpm  97.5 F  177.5 lbs  64 
in     30.4675 kg/m  1.9067 m  0 %  98 %

 

 12/15/2010  10:43:00 AM  104 mmHg  64 mmHg  48 bpm     97.8 F  150 lbs        
          

 

 2010  9:15:00 AM  112 mmHg  72 mmHg  49 bpm     98 F  149 lbs  64 in     
25.5755 kg/m  1.747 m  81.5 %   

 

 2010  8:54:00 AM  117 mmHg  65 mmHg  73 bpm  20 rpm  97.8 F  160.5 lbs   
               

 

 2010  9:42:00 AM  134 mmHg  66 mmHg  66 bpm        161 lbs               
   

 

 4/15/2010  4:02:00 PM  135 mmHg  71 mmHg  77 bpm        162 lbs  64 in     
27.81 kg/m2  1.82 m2  89.9 %   







Social History







 Name  Description  Comments

 

 History of tobacco usage     Quit smoking in January - was smoking 1-2cig/day

 

 denies alcohol use      

 

 Tobacco  Former smoker   







History of Procedures







 Date Ordered  Description  Order Status

 

 2011 12:00 AM  URINE PREGNANCY TEST  Reviewed

 

 2011 12:00 AM  IMMUNIZATION ADMIN  Reviewed

 

 2011 12:00 AM  HPV VACCINE 4 VALENT IM  Reviewed

 

 2016 12:00 AM  CYTOPATH C/V THIN LAYER  Returned

 

 2016 12:00 AM  SPECIMEN HANDLING OFFICE-LAB  Reviewed

 

 2016 12:00 AM  N.GONORRHOEAE DNA AMP PROB  Returned

 

 2016 12:00 AM  CHLAMYDIA CULTURE  Returned

 

 2016 12:00 AM  HIV-1ANTIBODY  Returned

 

 2016 12:00 AM  URINALYSIS AUTO W/SCOPE  Returned

 

 2016 12:00 AM  OBSTETRIC PANEL  Returned

 

 2016 12:00 AM  GLUCOSE TOLERANCE TEST (GTT)  Returned

 

 3/28/2016 12:00 AM  ALPHA-FETOPROTEIN SERUM  Returned

 

 3/28/2016 12:00 AM  CHORIONIC GONADOTROPIN TEST  Returned

 

 3/28/2016 12:00 AM  CHORIONIC GONADOTROPIN ASSAY  Returned

 

 5/3/2016 12:00 AM  OB US >/=14 WKS SNGL FETUS  Returned

 

 2016 12:00 AM  RH IG FULL-DOSE IM  Returned

 

 2016 12:00 AM  Type and screen  Returned

 

 2016 12:00 AM  GLUCOSE TOLERANCE TEST (GTT)  Returned

 

 2016 12:00 AM  COMPLETE CBC W/AUTO DIFF WBC  Returned

 

 2012 12:00 AM  X-RAY EXAM KNEE 4 OR MORE  Returned

 

 2016 12:00 AM  TDAP VACCINE 7 YRS/> IM  Reviewed

 

 2016 12:00 AM  IMMUNIZATION ADMIN  Reviewed

 

 4/15/2010 12:00 AM  OBSTETRIC PANEL  Reviewed

 

 4/15/2010 12:00 AM  HIV-1ANTIBODY  Reviewed

 

 4/15/2010 12:00 AM  URINALYSIS NONAUTO W/SCOPE  Reviewed

 

 4/15/2010 12:00 AM  OB US < 14 WKS SINGLE FETUS  Reviewed

 

 2010 12:00 AM  GLUCOSE TEST  Reviewed

 

 2010 12:00 AM  Rhogam  Reviewed

 

 2010 12:00 AM  Type and screen  Reviewed

 

 2010 12:00 AM  COMPLETE CBC W/AUTO DIFF WBC  Reviewed

 

 2010 12:00 AM  COMPLETE CBC W/AUTO DIFF WBC  Reviewed

 

 10/14/2010 12:00 AM  CULTURE OTHR SPECIMN AEROBIC  Reviewed

 

 12/15/2010 12:00 AM  INSERTION IMPLANON, IMPLANTABLE CONTRACEPTIVE CAPSULES  
Reviewed

 

 12/15/2010 12:00 AM  Etonogestrel (contraceptive) implant system, (Implanon)  
Reviewed

 

 7/15/2011 12:00 AM  CYTOPATH TBS C/V MANUAL  Reviewed

 

 7/15/2011 12:00 AM  CHLAMYDIA CULTURE  Reviewed

 

 7/15/2011 12:00 AM  N.GONORRHOEAE DNA AMP PROB  Reviewed

 

 7/15/2011 12:00 AM  URINE PREGNANCY TEST  Reviewed

 

 2011 12:00 AM  IMMUNIZATION ADMIN  Reviewed

 

 2011 12:00 AM  HPV VACCINE 4 VALENT IM  Reviewed







Results Summary







 Data and Description  Results

 

 2010 10:41 AM  RUBELLA 34.0 IU/mLCOLOR YELLOW APPEARANCE HAZY SPEC GRAV 
1.025 pH 7.0 PROTEIN TRACE GLUCOSE NEGATIVE KETONE 15 BILIRUBIN NEGATIVE BLOOD 
NEGATIVE NITRITE NEGATIVE LEUK SCREEN NEGATIVE CASTS/LPF NEGATIVE CRYSTALS 
TRACE AMORPH MUCOUS THRDS FEW BACTERIA NEGATIVE EPITH CELLS FEW SQUAMOUS 
TRICHOMONAS NEGATIVE YEAST NEGATIVE WBC 6.3 RBC 4.57 HGB 13.90 g/dLHCT 40.30 %
MCV 88.0 fLMCH 30.40 pgMCHC 34.50 g/dLMPV 9.20 fLPLT 258 %NEUT 65.10 %%LYMP 
28.30 %#NEUT 4.10 #LYMP 1.80 

 

 2010 3:48 PM  WBC 12.9 RBC 4.02 HGB 12.60 g/dLHCT 37.40 %MCV 93.0 fLMCH 
31.30 pgMCHC 33.70 g/dLRDW CV 14.10 %MPV 9.50 fLPLT 263 %NEUT 69.20 %%LYMP 
22.30 %%MONO 7.60 %%EOS 0.70 %%BASO 0.20 %#NEUT 8.91 #LYMP 2.88 #MONO 0.98 #EOS 
0.09 #BASO 0.03 

 

 10/22/2010 8:21 PM  COLOR YELLOW APPEARANCE CLEAR SPEC GRAV 1.010 pH 6.5 
PROTEIN NEGATIVE GLUCOSE NEGATIVE KETONE 15 BILIRUBIN NEGATIVE BLOOD NEGATIVE 
NITRITE NEGATIVE LEUK SCREEN SMALL CASTS/LPF NEGATIVE CRYSTALS NEGATIVE MUCOUS 
THRDS NEGATIVE BACTERIA 2++ EPITH CELLS 3+++ SQUAMOUS TRICHOMONAS NEGATIVE 
YEAST NEGATIVE 

 

 2010 11:58 PM  COLOR YELLOW APPEARANCE CLEAR SPEC GRAV 1.015 pH 6.5 
PROTEIN NEGATIVE GLUCOSE NEGATIVE KETONE NEGATIVE BILIRUBIN NEGATIVE BLOOD 
NEGATIVE NITRITE NEGATIVE LEUK SCREEN LARGE CASTS/LPF NEGATIVE CRYSTALS 
NEGATIVE MUCOUS THRDS NEGATIVE BACTERIA 2++ EPITH CELLS 2++ SQUAMOUS 
TRICHOMONAS NEGATIVE YEAST NEGATIVE 

 

 11/15/2010 5:30 PM  .0 IU/LURIC ACID 3.7 mg/dLGLUCOSE 66.0 mg/dLSODIUM 
137.0 mmol/LPOTASSIUM 3.10 mmol/LCHLORIDE 105.0 mmol/LCO2 21.0 mmol/LBUN 3.0 mg/
dLCREATININE 0.50 mg/dLSGOT/AST 20.0 IU/LSGPT/ALT 18.0 IU/LALK PHOS 152.0 IU/
LTOTAL PROTEIN 6.20 g/dLALBUMIN 3.40 g/dLTOTAL BILI 0.60 mg/dLCALCIUM 9.10 mg/
dLeGFR >60 mL/min/1.73 m2WBC 16.9 RBC 4.18 HGB 13.30 g/dLHCT 39.70 %MCV 95.0 
fLMCH 31.80 pgMCHC 33.50 g/dLRDW CV 14.0 %MPV 10.10 fLPLT 268 %NEUT 75.70 %%
LYMP 17.50 %%MONO 6.20 %%EOS 0.40 %%BASO 0.20 %#NEUT 12.82 #LYMP 2.97 #MONO 
1.05 #EOS 0.06 #BASO 0.03 

 

 11/15/2010 8:50 PM  PROTEIN UR 14.0 mg/dL

 

 2016 12:21 PM  Pap Smear Collected 

 

 2016 2:00 PM  WBC 9.8 RBC 4.60 HGB 13.60 g/dLHCT 41.40 %MCV 90.0 fLMCH 
29.60 pgMCHC 32.90 g/dLRDW CV 13.80 %MPV 9.70 fLPLT 271 %NEUT 59.80 %%LYMP 33.0 
%%MONO 6.40 %%EOS 0.60 %%BASO 0.20 %#NEUT 5.83 #LYMP 3.22 #MONO 0.62 #EOS 0.06 #
BASO 0.02 HIV AG/AB COMBO 0.14 RPR Non Reactive Rubella Antibodies, IgG 2.88 
Index

 

 2016 2:08 PM  COLOR YELLOW APPEARANCE CLEAR SPEC GRAV <=1.005 pH 6.0 
PROTEIN NEGATIVE GLUCOSE NEGATIVE mg/dLKETONE NEGATIVE BILIRUBIN NEGATIVE BLOOD 
NEGATIVE NITRITE NEGATIVE LEUK SCREEN TRACE CASTS/LPF NEGATIVE /LPFCRYSTALS 
NEGATIVE MUCOUS THRDS NEGATIVE BACTERIA NEGATIVE EPITH CELLS 3+++ SQUAMOUS /
HPFTRICHOMONAS NEGATIVE YEAST NEGATIVE 

 

 3/28/2016 2:18 PM  AFP Value 0.0240 ug/mLAFP MoM 1.02 hCG Value 76617.0 mIU/
mLhCG MoM 1.77 uE3 Value 0.70 ng/mLuE3 MoM 1.16 MARISA Value 207.520 pg/mLDIA MoM 
1.25 OSBR Risk       1 [place] [address] DSR (Second Trimester) 1IN 4435 DSR (By Age)    
1 IN 1003 T18 Risk Not increased T18 (By Age) 1:3907 

 

 2016 3:15 PM  WBC 11.5 RBC 3.75 HGB 11.70 g/dLHCT 35.50 %MCV 95.0 fLMCH 
31.20 pgMCHC 33.0 g/dLRDW CV 14.10 %MPV 9.70 fLPLT 273 %NEUT 73.0 %%LYMP 20.10 %
%MONO 5.70 %%EOS 0.80 %%BASO 0.10 %#NEUT 8.40 #LYMP 2.31 #MONO 0.65 #EOS 0.09 #
BASO 0.01 







History Of Immunizations







 Name  Date Admin  Mfg Name  Mfg Code  Trade Name  Lot#  Route  Inj  Vis Given  
Vis Pub  CVX

 

 HPV  2011  [employer]  MSD  GARDASIL  [id number]  Intramuscular  Left 
Deltoid  2011  999

 

 HPV  2011  [employer]  MSD  GARDASIL  [id number]  Intramuscular  Left 
Deltoid  2011  62

 

 Tdap  2016  GlaxNew Futuroine  SKB  BOOSTRIX  B4G4G  Intramuscular  Right 
Deltoid  2016  115







History of Past Illness







 Name  Date of Onset  Comments

 

 Atrial Septal Defect, Ostium Secundum Type      

 

 Prenatal Care, High Risk Pregnancy  2010   

 

    Apr 15 2010  4:29PM   

 

 Prenatal Care, High Risk Pregnancy  Apr 15 2010  4:13PM   

 

    2010  9:54AM   

 

 Prenatal Care, High Risk Pregnancy  2010 10:10AM   

 

    May 13 2010  9:01AM   

 

    John 10 2010  2:45PM   

 

    2010  4:16PM   

 

 Pregnancy, First Normal  Aug 30 2010  9:14AM   

 

 Pregnancy, High Risk  Sep 13 2010  3:13PM   

 

 Group B Strep Screening,   Oct 14 2010  5:43PM   

 

 Pregnancy, High Risk  Oct 14 2010  5:43PM   

 

 Postoperative Examination Following Surgery  Dec  6 2010  9:15AM   

 

 IMPLANON  Insertion  Dec 15 2010 10:44AM   

 

 General Medical Exam, Child  2011  8:58AM   

 

 Contraceptive Management  2011  8:58AM   

 

 Routine gynecological examination  Jul 15 2011 10:03AM   

 

 Contraceptive Counseling  Jul 15 2011 10:03AM   

 

 Vaginal Discharge  Jul 15 2011 10:03AM   

 

 Irregular Menses  Jul 15 2011 10:03AM   

 

 Gardsil (HPV)  2011  8:54AM   

 

 Irregular Menses  Aug 31 2011  9:07AM   

 

 Family Planning  Aug 31 2011  9:07AM   

 

 Contraceptive Management  Andreas  3 2012  9:43AM   

 

 Local Infection  Andreas  3 2012  9:43AM   

 

 Well Child Examination  2012  1:46PM   

 

 Right knee pain  2012  1:46PM   

 

 Right knee pain  2012  1:24PM   

 

 Abscess  2012  1:24PM   

 

 Right knee pain  Mar 26 2012 10:32AM   

 

 General Medical Exam, Adult  2012  1:46PM   

 

 Pregnancy test confirmed positive  2016 11:37AM   

 

 Obesity  2016 11:37AM   

 

 Normal pregnancy in multigravida in second trimester  Mar 28 2016  2:01PM   

 

 History of atrial septal defect repair  May 11 2016  8:50AM   

 

 Normal pregnancy in multigravida in second trimester  2016  2:00PM   

 

 Need for Tdap vaccine  2016  1:38PM   







Payers







 Insurance Name  Company Name  Plan Name  Plan Number  Policy Number  Policy 
Group Number  Start Date

 

    [facility] Plan [facility]  
   [id number]     N/A

 

    [facility]  [employer] [employer]     
[id number]     N/A

 

    zzzTest Medicare A  Test Medicare A     void     N/A







History of Encounters







 Visit Date  Visit Type  Provider

 

 2016  Office visit  Dr. Antonella Ventura MD

 

 2016  Office visit  Dr. Antonella Ventura MD

 

 2016  Office visit  Dr. Antonella Ventura MD

 

 2016  Office visit  Dr. Antonella Ventura MD

 

 2016  Office visit  Dr. Antonella Ventura MD

 

 3/28/2016  Office visit  Dr. Antonella Ventura MD

 

 2016  Office visit  Dr. Antonella Ventura MD

 

 2016  Office visit  Dr. Antonella Ventura MD

 

 2016  Office visit  Velma Mendieta APRN

 

 3/26/2012  Office visit  Ivanna Swan APRN

 

 2012  Office visit  Ivanna Swan APRN

 

 2012  Office visit  Ivanna Swan APRN

 

 1/3/2012  Office visit  Ivanna Swan APRN

 

 2011  Nurse visit  Ivanna Swan APRN

 

 2011  Nurse visit  Ivanna Swan APRN

 

 7/15/2011  Office visit  Ivanna Swan APRN

 

 2011  Office visit  Ivanna Swan APRN

 

 12/15/2010  Office visit  Willard Sorensen MD

 

 2010  Surgery  Willard Sorensen MD

 

 2010  Hospital  Willard Sorensen MD

 

 11/15/2010  Office visit  Willard Sorensen MD

 

 11/15/2010  [facility]  Willard Sorensen MD

 

 2010  Office visit  Willard Sorensen MD

 

 2010  Office visit  Willard Sorensen MD

 

 10/28/2010  Office visit  Willard Sorensen MD

 

 10/21/2010  Office visit  Willard Sorensen MD

 

 10/14/2010  Office visit  Willard Sorensen MD

 

 10/9/2010  [facility]  Willard Sorensen MD

 

 10/7/2010  Office visit  Willard Sorensen MD

 

 2010  Office visit  Willard Sorensen MD

 

 2010  Office visit  Willard Sorensen MD

 

 2010  Office visit  Willard Sorensen MD

 

 2010  Office visit  Willard Sorensen MD

 

 2010  Office visit  Willard Sorensen MD

 

 6/10/2010  Office visit  Willard Sorensen MD

 

 2010  Office visit  Willard Sorensen MD

 

 2010  Office visit  Willard Sorensen MD

 

 4/15/2010  Office visit  Willard Sorensen MD

## 2018-11-29 NOTE — XMS REPORT
MU2 Ambulatory Summary

 Created on: 2016



Radha Aviles

External Reference #: [id number]

: 1990

Sex: Female



Demographics







 Address  [facility] [facility]

[place]  [address]

 

 Home Phone  [phone]

 

 Preferred Language  English

 

 Marital Status  Never 

 

 Anglican Affiliation  Unknown

 

 Race  White

 

 Ethnic Group  Not  or 





Author







 Author  Antonella Ventura

 

 [facility]

 

 Address  [address]

[place], [place]  [id number]



 

 Phone  [phone]







Care Team Providers







 Care Team Member Name  Role  Phone

 

 Antonella Ventura  PCP  Unavailable







Allergies and Adverse Reactions







 Name  Reaction  Notes

 

 NO KNOWN DRUG ALLERGIES      







Plan of Treatment

Not available.



Medications







  

 

 Name  Start Date  Expiration Date  SIG  Comments

 

 NataFort 60 mg iron-1 mg oral tablet  4/15/2010  3/11/2011  take 1 tablet by 
oral route once daily for 30 days   

 

 Diflucan 150 mg oral tablet  2010  take 1 tablet (150 mg) by 
oral route once  for 1 days   

 

 Premarin 1.25 mg oral tablet  2011  take 1 tablet by oral route 
daily for 10 days   

 

 doxycycline hyclate 100 mg oral tablet  7/15/2011  2011  take 1 tablet (
100 mg) by oral route every 12 hours for 10 days   

 

 ibuprofen 800 mg oral tablet  2012  take 1 tablet by oral route 
3 times a day for 30 days   

 

 Bactrim -160 mg oral tablet  2012  take 1 tablet by oral 
route every 12 hours for 7 days   

 

 Macrobid 100 mg oral capsule  2016  take 1 capsule (100 mg) by 
oral route 2 times per day with food for 7 days   









 Discontinued 

 

 Name  Start Date  Discontinued Date  SIG  Comments

 

 atenolol 25 mg oral tablet     2011  take 1/2 tablet by oral route QD   

 

 Medrol (Antwan) 4 mg oral tablets,dose pack  2012  3/26/2012  take as 
directed   







Problem List







 Description  Status  Onset

 

 Prenatal Care, High Risk Pregnancy  Active  2010







Vital Signs







 Date  Time  BP-Sys(mm[Hg]  BP-Marisa(mm[Hg])  HR(bpm)  RR(rpm)  Temp  WT  HT  HC  
BMI  BSA  BMI Percentile  O2 Sat(%)

 

 2016  11:15:00 AM  130 mmHg  70 mmHg  80 bpm  20 rpm     214 lbs  64 in  
   36.73 kg/m2  2.09 m2      

 

 3/26/2012  10:30:00 AM  126 mmHg  62 mmHg  106 bpm  18 rpm  96.8 F  205 lbs  
64 in     35.1878 kg/m  2.0491 m     97 %

 

 2012  1:22:00 PM  120 mmHg  68 mmHg  66 bpm  18 rpm  97 F  202.375 lbs  
64 in     34.74 kg/m2  2.04 m2      

 

 2012  1:44:00 PM  122 mmHg  64 mmHg  68 bpm  18 rpm  98.2 F  198.125 lbs  
64 in     34.0077 kg/m  2.0145 m      

 

 1/3/2012  9:40:00 AM  103 mmHg  77 mmHg  64 bpm  18 rpm  97.7 F  197.125 lbs  
64 in     33.84 kg/m2  2.01 m2      

 

 2011  9:06:00 AM  118 mmHg  68 mmHg  74 bpm     97.4 F  189 lbs          
        

 

 7/15/2011  10:06:00 AM  126 mmHg  70 mmHg  73 bpm  20 rpm  97.9 F  180.5 lbs  
64 in     30.98 kg/m2  1.92 m2  0 %  99 %

 

 2011  8:56:00 AM  96 mmHg  68 mmHg  66 bpm  20 rpm  97.5 F  177.5 lbs  64 
in     30.4675 kg/m  1.9067 m  0 %  98 %

 

 12/15/2010  10:43:00 AM  104 mmHg  64 mmHg  48 bpm     97.8 F  150 lbs        
          

 

 2010  9:15:00 AM  112 mmHg  72 mmHg  49 bpm     98 F  149 lbs  64 in     
25.5755 kg/m  1.747 m  81.5 %   

 

 2010  8:54:00 AM  117 mmHg  65 mmHg  73 bpm  20 rpm  97.8 F  160.5 lbs   
               

 

 2010  9:42:00 AM  134 mmHg  66 mmHg  66 bpm        161 lbs               
   

 

 4/15/2010  4:02:00 PM  135 mmHg  71 mmHg  77 bpm        162 lbs  64 in     
27.81 kg/m2  1.82 m2  89.9 %   







Social History







 Name  Description  Comments

 

 History of tobacco usage     Quit smoking in January - was smoking 1-2cig/day

 

 denies alcohol use      

 

 Tobacco  Former smoker   







History of Procedures







 Date Ordered  Description  Order Status

 

 2011 12:00 AM  URINE PREGNANCY TEST  Reviewed

 

 2011 12:00 AM  IMMUNIZATION ADMIN  Reviewed

 

 2011 12:00 AM  HPV VACCINE 4 VALENT IM  Reviewed

 

 2016 12:00 AM  CYTOPATH C/V THIN LAYER  Returned

 

 2016 12:00 AM  SPECIMEN HANDLING OFFICE-LAB  Reviewed

 

 2016 12:00 AM  N.GONORRHOEAE DNA AMP PROB  Returned

 

 2016 12:00 AM  CHLAMYDIA CULTURE  Returned

 

 2016 12:00 AM  HIV-1ANTIBODY  Returned

 

 2016 12:00 AM  URINALYSIS AUTO W/SCOPE  Returned

 

 2016 12:00 AM  OBSTETRIC PANEL  Returned

 

 2016 12:00 AM  GLUCOSE TOLERANCE TEST (GTT)  Returned

 

 3/28/2016 12:00 AM  ALPHA-FETOPROTEIN SERUM  Returned

 

 3/28/2016 12:00 AM  CHORIONIC GONADOTROPIN TEST  Returned

 

 3/28/2016 12:00 AM  CHORIONIC GONADOTROPIN ASSAY  Returned

 

 5/3/2016 12:00 AM  OB US >/=14 WKS SNGL FETUS  Returned

 

 2016 12:00 AM  RH IG FULL-DOSE IM  Returned

 

 2016 12:00 AM  Type and screen  Returned

 

 2016 12:00 AM  GLUCOSE TOLERANCE TEST (GTT)  Returned

 

 2016 12:00 AM  COMPLETE CBC W/AUTO DIFF WBC  Returned

 

 2012 12:00 AM  X-RAY EXAM KNEE 4 OR MORE  Returned

 

 2016 12:00 AM  TDAP VACCINE 7 YRS/> IM  Reviewed

 

 2016 12:00 AM  IMMUNIZATION ADMIN  Reviewed

 

 2016 12:00 AM  CULTURE SCREEN ONLY  Returned

 

 4/15/2010 12:00 AM  OBSTETRIC PANEL  Reviewed

 

 4/15/2010 12:00 AM  HIV-1ANTIBODY  Reviewed

 

 4/15/2010 12:00 AM  URINALYSIS NONAUTO W/SCOPE  Reviewed

 

 4/15/2010 12:00 AM  OB US < 14 WKS SINGLE FETUS  Reviewed

 

 2010 12:00 AM  GLUCOSE TEST  Reviewed

 

 2010 12:00 AM  Rhogam  Reviewed

 

 2010 12:00 AM  Type and screen  Reviewed

 

 2010 12:00 AM  COMPLETE CBC W/AUTO DIFF WBC  Reviewed

 

 2010 12:00 AM  COMPLETE CBC W/AUTO DIFF WBC  Reviewed

 

 10/14/2010 12:00 AM  CULTURE OTHR SPECIMN AEROBIC  Reviewed

 

 12/15/2010 12:00 AM  INSERTION IMPLANON, IMPLANTABLE CONTRACEPTIVE CAPSULES  
Reviewed

 

 12/15/2010 12:00 AM  Etonogestrel (contraceptive) implant system, (Implanon)  
Reviewed

 

 7/15/2011 12:00 AM  CYTOPATH TBS C/V MANUAL  Reviewed

 

 7/15/2011 12:00 AM  CHLAMYDIA CULTURE  Reviewed

 

 7/15/2011 12:00 AM  N.GONORRHOEAE DNA AMP PROB  Reviewed

 

 7/15/2011 12:00 AM  URINE PREGNANCY TEST  Reviewed

 

 2011 12:00 AM  IMMUNIZATION ADMIN  Reviewed

 

 2011 12:00 AM  HPV VACCINE 4 VALENT IM  Reviewed







Results Summary







 Data and Description  Results

 

 2010 10:41 AM  RUBELLA 34.0 IU/mLCOLOR YELLOW APPEARANCE HAZY SPEC GRAV 
1.025 pH 7.0 PROTEIN TRACE GLUCOSE NEGATIVE KETONE 15 BILIRUBIN NEGATIVE BLOOD 
NEGATIVE NITRITE NEGATIVE LEUK SCREEN NEGATIVE CASTS/LPF NEGATIVE CRYSTALS 
TRACE AMORPH MUCOUS THRDS FEW BACTERIA NEGATIVE EPITH CELLS FEW SQUAMOUS 
TRICHOMONAS NEGATIVE YEAST NEGATIVE WBC 6.3 RBC 4.57 HGB 13.90 g/dLHCT 40.30 %
MCV 88.0 fLMCH 30.40 pgMCHC 34.50 g/dLMPV 9.20 fLPLT 258 %NEUT 65.10 %%LYMP 
28.30 %#NEUT 4.10 #LYMP 1.80 

 

 2010 3:48 PM  WBC 12.9 RBC 4.02 HGB 12.60 g/dLHCT 37.40 %MCV 93.0 fLMCH 
31.30 pgMCHC 33.70 g/dLRDW CV 14.10 %MPV 9.50 fLPLT 263 %NEUT 69.20 %%LYMP 
22.30 %%MONO 7.60 %%EOS 0.70 %%BASO 0.20 %#NEUT 8.91 #LYMP 2.88 #MONO 0.98 #EOS 
0.09 #BASO 0.03 

 

 10/22/2010 8:21 PM  COLOR YELLOW APPEARANCE CLEAR SPEC GRAV 1.010 pH 6.5 
PROTEIN NEGATIVE GLUCOSE NEGATIVE KETONE 15 BILIRUBIN NEGATIVE BLOOD NEGATIVE 
NITRITE NEGATIVE LEUK SCREEN SMALL CASTS/LPF NEGATIVE CRYSTALS NEGATIVE MUCOUS 
THRDS NEGATIVE BACTERIA 2++ EPITH CELLS 3+++ SQUAMOUS TRICHOMONAS NEGATIVE 
YEAST NEGATIVE 

 

 2010 11:58 PM  COLOR YELLOW APPEARANCE CLEAR SPEC GRAV 1.015 pH 6.5 
PROTEIN NEGATIVE GLUCOSE NEGATIVE KETONE NEGATIVE BILIRUBIN NEGATIVE BLOOD 
NEGATIVE NITRITE NEGATIVE LEUK SCREEN LARGE CASTS/LPF NEGATIVE CRYSTALS 
NEGATIVE MUCOUS THRDS NEGATIVE BACTERIA 2++ EPITH CELLS 2++ SQUAMOUS 
TRICHOMONAS NEGATIVE YEAST NEGATIVE 

 

 11/15/2010 5:30 PM  .0 IU/LURIC ACID 3.7 mg/dLGLUCOSE 66.0 mg/dLSODIUM 
137.0 mmol/LPOTASSIUM 3.10 mmol/LCHLORIDE 105.0 mmol/LCO2 21.0 mmol/LBUN 3.0 mg/
dLCREATININE 0.50 mg/dLSGOT/AST 20.0 IU/LSGPT/ALT 18.0 IU/LALK PHOS 152.0 IU/
LTOTAL PROTEIN 6.20 g/dLALBUMIN 3.40 g/dLTOTAL BILI 0.60 mg/dLCALCIUM 9.10 mg/
dLeGFR >60 mL/min/1.73 m2WBC 16.9 RBC 4.18 HGB 13.30 g/dLHCT 39.70 %MCV 95.0 
fLMCH 31.80 pgMCHC 33.50 g/dLRDW CV 14.0 %MPV 10.10 fLPLT 268 %NEUT 75.70 %%
LYMP 17.50 %%MONO 6.20 %%EOS 0.40 %%BASO 0.20 %#NEUT 12.82 #LYMP 2.97 #MONO 
1.05 #EOS 0.06 #BASO 0.03 

 

 11/15/2010 8:50 PM  PROTEIN UR 14.0 mg/dL

 

 2016 12:21 PM  Pap Smear Collected 

 

 2016 2:00 PM  WBC 9.8 RBC 4.60 HGB 13.60 g/dLHCT 41.40 %MCV 90.0 fLMCH 
29.60 pgMCHC 32.90 g/dLRDW CV 13.80 %MPV 9.70 fLPLT 271 %NEUT 59.80 %%LYMP 33.0 
%%MONO 6.40 %%EOS 0.60 %%BASO 0.20 %#NEUT 5.83 #LYMP 3.22 #MONO 0.62 #EOS 0.06 #
BASO 0.02 HIV AG/AB COMBO 0.14 RPR Non Reactive Rubella Antibodies, IgG 2.88 
Index

 

 2016 2:08 PM  COLOR YELLOW APPEARANCE CLEAR SPEC GRAV <=1.005 pH 6.0 
PROTEIN NEGATIVE GLUCOSE NEGATIVE mg/dLKETONE NEGATIVE BILIRUBIN NEGATIVE BLOOD 
NEGATIVE NITRITE NEGATIVE LEUK SCREEN TRACE CASTS/LPF NEGATIVE /LPFCRYSTALS 
NEGATIVE MUCOUS THRDS NEGATIVE BACTERIA NEGATIVE EPITH CELLS 3+++ SQUAMOUS /
HPFTRICHOMONAS NEGATIVE YEAST NEGATIVE 

 

 3/28/2016 2:18 PM  AFP Value 0.0240 ug/mLAFP MoM 1.02 hCG Value 37861.0 mIU/
mLhCG MoM 1.77 uE3 Value 0.70 ng/mLuE3 MoM 1.16 MARISA Value 207.520 pg/mLDIA MoM 
1.25 OSBR Risk       1 [place] [address] DSR (Second Trimester) 1IN 4435 DSR (By Age)    
1 IN 1003 T18 Risk Not increased T18 (By Age) 1:3907 

 

 2016 3:15 PM  WBC 11.5 RBC 3.75 HGB 11.70 g/dLHCT 35.50 %MCV 95.0 fLMCH 
31.20 pgMCHC 33.0 g/dLRDW CV 14.10 %MPV 9.70 fLPLT 273 %NEUT 73.0 %%LYMP 20.10 %
%MONO 5.70 %%EOS 0.80 %%BASO 0.10 %#NEUT 8.40 #LYMP 2.31 #MONO 0.65 #EOS 0.09 #
BASO 0.01 







History Of Immunizations







 Name  Date Admin  Mfg Name  Mfg Code  Trade Name  Lot#  Route  Inj  Vis Given  
Vis Pub  CVX

 

 HPV  2011  [employer]  MSD  GARDASIL  [id number]  Intramuscular  Left 
Deltoid  2011  999

 

 HPV  2011  [employer]  MSD  GARDASIL  [id number]  Intramuscular  Left 
Deltoid  2011  62

 

 Tdap  2016  [employer]  SKB  BOOSTRIX  [id number]  Intramuscular  Right 
Deltoid  2016  115







History of Past Illness







 Name  Date of Onset  Comments

 

 Atrial Septal Defect, Ostium Secundum Type      

 

 Prenatal Care, High Risk Pregnancy  2010   

 

    Apr 15 2010  4:29PM   

 

 Prenatal Care, High Risk Pregnancy  Apr 15 2010  4:13PM   

 

    2010  9:54AM   

 

 Prenatal Care, High Risk Pregnancy  2010 10:10AM   

 

    May 13 2010  9:01AM   

 

    John 10 2010  2:45PM   

 

    2010  4:16PM   

 

 Pregnancy, First Normal  Aug 30 2010  9:14AM   

 

 Pregnancy, High Risk  Sep 13 2010  3:13PM   

 

 Group B Strep Screening,   Oct 14 2010  5:43PM   

 

 Pregnancy, High Risk  Oct 14 2010  5:43PM   

 

 Postoperative Examination Following Surgery  Dec  6 2010  9:15AM   

 

 IMPLANON  Insertion  Dec 15 2010 10:44AM   

 

 General Medical Exam, Child  2011  8:58AM   

 

 Contraceptive Management  2011  8:58AM   

 

 Routine gynecological examination  Jul 15 2011 10:03AM   

 

 Contraceptive Counseling  Jul 15 2011 10:03AM   

 

 Vaginal Discharge  Jul 15 2011 10:03AM   

 

 Irregular Menses  Jul 15 2011 10:03AM   

 

 Gardsil (HPV)  2011  8:54AM   

 

 Irregular Menses  Aug 31 2011  9:07AM   

 

 Family Planning  Aug 31 2011  9:07AM   

 

 Contraceptive Management  Andreas  3 2012  9:43AM   

 

 Local Infection  Andreas  3 2012  9:43AM   

 

 Well Child Examination  2012  1:46PM   

 

 Right knee pain  2012  1:46PM   

 

 Right knee pain  2012  1:24PM   

 

 Abscess  2012  1:24PM   

 

 Right knee pain  Mar 26 2012 10:32AM   

 

 General Medical Exam, Adult  2012  1:46PM   

 

 Pregnancy test confirmed positive  2016 11:37AM   

 

 Obesity  2016 11:37AM   

 

 Normal pregnancy in multigravida in second trimester  Mar 28 2016  2:01PM   

 

 History of atrial septal defect repair  May 11 2016  8:50AM   

 

 Normal pregnancy in multigravida in second trimester  2016  2:00PM   

 

 Need for Tdap vaccine  2016  1:38PM   

 

 Group B Strep Screening,   Aug 23 2016  3:43PM   

 

 Normal pregnancy in multigravida in third trimester  Aug 23 2016  3:43PM   







Payers







 Insurance Name  Company Name  Plan Name  Plan Number  Policy Number  Policy 
Group Number  Start Date

 

    [facility]  
   [id number]     N/A

 

    [place] [employer]  [place] [employer]     
[id number]     N/A

 

    zzzTest Medicare A  Test Medicare A     void     N/A







History of Encounters







 Visit Date  Visit Type  Provider

 

 2016  Office visit  Dr. Antonella Ventura MD

 

 2016  Office visit  Dr. Antonella Ventura MD

 

 2016  Office visit  Dr. Antonella Ventura MD

 

 2016  Office visit  Dr. Antonella Ventura MD

 

 2016  Office visit  Dr. Antonella Ventura MD

 

 2016  Office visit  Dr. Antonella Ventura MD

 

 3/28/2016  Office visit  Dr. Antonella Ventura MD

 

 2016  Office visit  Dr. Antonella Ventura MD

 

 2016  Office visit  Dr. Antonella Ventura MD

 

 2016  Office visit  Velma MANCynthia Mendieta APRN

 

 3/26/2012  Office visit  Ivanna Walker APRN

 

 2012  Office visit  Ivanna Walker APRN

 

 2012  Office visit  Ivanna Walker APRN

 

 1/3/2012  Office visit  Ivanna Walker APRN

 

 2011  Nurse visit  Ivanna Walker APRN

 

 2011  Nurse visit  Ivanna Walker APRN

 

 7/15/2011  Office visit  Ivanna Walker APRN

 

 2011  Office visit  Ivanna Beny APRN

 

 12/15/2010  Office visit  Willard Sorensen MD

 

 2010  Surgery  Willard Sorensen MD

 

 2010  [facility]  Willard Sorensen MD

 

 11/15/2010  Office visit  Willard Sorensen MD

 

 11/15/2010  [facility]  Willard Sorensen MD

 

 2010  Office visit  Willard Sorensen MD

 

 2010  Office visit  Willard Sorensen MD

 

 10/28/2010  Office visit  Willard Sorensen MD

 

 10/21/2010  Office visit  Willard Sorensen MD

 

 10/14/2010  Office visit  Willard Sorensen MD

 

 10/9/2010  [facility]  Willard Sorensen MD

 

 10/7/2010  Office visit  Willard Sorensen MD

 

 2010  Office visit  Willard Sorensen MD

 

 2010  Office visit  Willard Sorensen MD

 

 2010  Office visit  Willard Sorensen MD

 

 2010  Office visit  Willard Sorensen MD

 

 2010  Office visit  Willard Sorensen MD

 

 6/10/2010  Office visit  Willard Sorensen MD

 

 2010  Office visit  Willard Sorensen MD

 

 2010  Office visit  Willard Sorensen MD

 

 4/15/2010  Office visit  Willard Sorensen MD

## 2018-11-29 NOTE — XMS REPORT
MU2 Ambulatory Summary

 Created on: 2016



Radha Aviles

External Reference #: [id number]

: 1990

Sex: Female



Demographics







 Address  [address] [place] [address] [address]

[place]  [address]

 

 Home Phone  [phone]

 

 Preferred Language  English

 

 Marital Status  Never 

 

 Alevism Affiliation  Unknown

 

 Race  White

 

 Ethnic Group  Not  or 





Author







 Author  Antonella Ventura  [facility]

 

 Address  [address]

[place], [place]  [id number]



 

 Phone  [phone]







Care Team Providers







 Care Team Member Name  Role  Phone

 

 Antonella Ventura  PCP  Unavailable







Allergies and Adverse Reactions







 Name  Reaction  Notes

 

 NO KNOWN DRUG ALLERGIES      







Plan of Treatment







 Planned Activity  Comments  Planned Date  Planned Time  Plan/Goal

 

 OB US >/=14 WKS SNGL FETUS     5/3/2016  12:00 AM   







Medications







  

 

 Name  Start Date  Expiration Date  SIG  Comments

 

 [place] 60 mg iron-1 mg oral tablet  4/15/2010  3/11/2011  take 1 tablet by 
oral route once daily for 30 days   

 

 Diflucan 150 mg oral tablet  2010  take 1 tablet (150 mg) by 
oral route once  for 1 days   

 

 Premarin 1.25 mg oral tablet  2011  take 1 tablet by oral route 
daily for 10 days   

 

 doxycycline hyclate 100 mg oral tablet  7/15/2011  2011  take 1 tablet (
100 mg) by oral route every 12 hours for 10 days   

 

 ibuprofen 800 mg oral tablet  2012  take 1 tablet by oral route 
3 times a day for 30 days   

 

 Bactrim -160 mg oral tablet  2012  take 1 tablet by oral 
route every 12 hours for 7 days   

 

 Macrobid 100 mg oral capsule  2016  take 1 capsule (100 mg) by 
oral route 2 times per day with food for 7 days   









 Discontinued 

 

 Name  Start Date  Discontinued Date  SIG  Comments

 

 atenolol 25 mg oral tablet     2011  take 1/2 tablet by oral route QD   

 

 Medrol (Antwan) 4 mg oral tablets,dose pack  2012  3/26/2012  take as 
directed   







Problem List







 Description  Status  Onset

 

 Prenatal Care, High Risk Pregnancy  Active  2010







Vital Signs







 Date  Time  BP-Sys(mm[Hg]  BP-Marisa(mm[Hg])  HR(bpm)  RR(rpm)  Temp  WT  HT  HC  
BMI  BSA  BMI Percentile  O2 Sat(%)

 

 2016  11:15:00 AM  130 mmHg  70 mmHg  80 bpm  20 rpm     214 lbs  64 in  
   36.73 kg/m2  2.09 m2      

 

 3/26/2012  10:30:00 AM  126 mmHg  62 mmHg  106 bpm  18 rpm  96.8 F  205 lbs  
64 in     35.1878 kg/m  2.0491 m     97 %

 

 2012  1:22:00 PM  120 mmHg  68 mmHg  66 bpm  18 rpm  97 F  202.375 lbs  
64 in     34.74 kg/m2  2.04 m2      

 

 2012  1:44:00 PM  122 mmHg  64 mmHg  68 bpm  18 rpm  98.2 F  198.125 lbs  
64 in     34.0077 kg/m  2.0145 m      

 

 1/3/2012  9:40:00 AM  103 mmHg  77 mmHg  64 bpm  18 rpm  97.7 F  197.125 lbs  
64 in     33.84 kg/m2  2.01 m2      

 

 2011  9:06:00 AM  118 mmHg  68 mmHg  74 bpm     97.4 F  189 lbs          
        

 

 7/15/2011  10:06:00 AM  126 mmHg  70 mmHg  73 bpm  20 rpm  97.9 F  180.5 lbs  
64 in     30.98 kg/m2  1.92 m2  0 %  99 %

 

 2011  8:56:00 AM  96 mmHg  68 mmHg  66 bpm  20 rpm  97.5 F  177.5 lbs  64 
in     30.4675 kg/m  1.9067 m  0 %  98 %

 

 12/15/2010  10:43:00 AM  104 mmHg  64 mmHg  48 bpm     97.8 F  150 lbs        
          

 

 2010  9:15:00 AM  112 mmHg  72 mmHg  49 bpm     98 F  149 lbs  64 in     
25.5755 kg/m  1.747 m  81.5 %   

 

 2010  8:54:00 AM  117 mmHg  65 mmHg  73 bpm  20 rpm  97.8 F  160.5 lbs   
               

 

 2010  9:42:00 AM  134 mmHg  66 mmHg  66 bpm        161 lbs               
   

 

 4/15/2010  4:02:00 PM  135 mmHg  71 mmHg  77 bpm        162 lbs  64 in     
27.81 kg/m2  1.82 m2  89.9 %   







Social History







 Name  Description  Comments

 

 History of tobacco usage     Quit smoking in January - was smoking 1-2cig/day

 

 denies alcohol use      

 

 Tobacco  Former smoker   







History of Procedures







 Date Ordered  Description  Order Status

 

 2011 12:00 AM  URINE PREGNANCY TEST  Reviewed

 

 2011 12:00 AM  IMMUNIZATION ADMIN  Reviewed

 

 2011 12:00 AM  HPV VACCINE 4 VALENT IM  Reviewed

 

 2016 12:00 AM  CYTOPATH C/V THIN LAYER  Returned

 

 2016 12:00 AM  SPECIMEN HANDLING OFFICE-LAB  Reviewed

 

 2016 12:00 AM  N.GONORRHOEAE DNA AMP PROB  Returned

 

 2016 12:00 AM  CHLAMYDIA CULTURE  Returned

 

 2016 12:00 AM  HIV-1ANTIBODY  Returned

 

 2016 12:00 AM  URINALYSIS AUTO W/SCOPE  Returned

 

 2016 12:00 AM  OBSTETRIC PANEL  Returned

 

 2016 12:00 AM  GLUCOSE TOLERANCE TEST (GTT)  Returned

 

 3/28/2016 12:00 AM  ALPHA-FETOPROTEIN SERUM  Returned

 

 3/28/2016 12:00 AM  CHORIONIC GONADOTROPIN TEST  Returned

 

 3/28/2016 12:00 AM  CHORIONIC GONADOTROPIN ASSAY  Returned

 

 2012 12:00 AM  X-RAY EXAM KNEE 4 OR MORE  Returned

 

 4/15/2010 12:00 AM  OBSTETRIC PANEL  Reviewed

 

 4/15/2010 12:00 AM  HIV-1ANTIBODY  Reviewed

 

 4/15/2010 12:00 AM  URINALYSIS NONAUTO W/SCOPE  Reviewed

 

 4/15/2010 12:00 AM  OB US < 14 WKS SINGLE FETUS  Reviewed

 

 2010 12:00 AM  GLUCOSE TEST  Reviewed

 

 2010 12:00 AM  Rhogam  Reviewed

 

 2010 12:00 AM  Type and screen  Reviewed

 

 2010 12:00 AM  COMPLETE CBC W/AUTO DIFF WBC  Reviewed

 

 2010 12:00 AM  COMPLETE CBC W/AUTO DIFF WBC  Reviewed

 

 10/14/2010 12:00 AM  CULTURE OTHR SPECIMN AEROBIC  Reviewed

 

 12/15/2010 12:00 AM  INSERTION IMPLANON, IMPLANTABLE CONTRACEPTIVE CAPSULES  
Reviewed

 

 12/15/2010 12:00 AM  Etonogestrel (contraceptive) implant system, (Implanon)  
Reviewed

 

 7/15/2011 12:00 AM  CYTOPATH TBS C/V MANUAL  Reviewed

 

 7/15/2011 12:00 AM  CHLAMYDIA CULTURE  Reviewed

 

 7/15/2011 12:00 AM  N.GONORRHOEAE DNA AMP PROB  Reviewed

 

 7/15/2011 12:00 AM  URINE PREGNANCY TEST  Reviewed

 

 2011 12:00 AM  IMMUNIZATION ADMIN  Reviewed

 

 2011 12:00 AM  HPV VACCINE 4 VALENT IM  Reviewed







Results Summary







 Data and Description  Results

 

 2010 10:41 AM  RUBELLA 34.0 IU/mLCOLOR YELLOW APPEARANCE HAZY SPEC GRAV 
1.025 pH 7.0 PROTEIN TRACE GLUCOSE NEGATIVE KETONE 15 BILIRUBIN NEGATIVE BLOOD 
NEGATIVE NITRITE NEGATIVE LEUK SCREEN NEGATIVE CASTS/LPF NEGATIVE CRYSTALS 
TRACE AMORPH MUCOUS THRDS FEW BACTERIA NEGATIVE EPITH CELLS FEW SQUAMOUS 
TRICHOMONAS NEGATIVE YEAST NEGATIVE WBC 6.3 RBC 4.57 HGB 13.90 g/dLHCT 40.30 %
MCV 88.0 fLMCH 30.40 pgMCHC 34.50 g/dLMPV 9.20 fLPLT 258 %NEUT 65.10 %%LYMP 
28.30 %#NEUT 4.10 #LYMP 1.80 

 

 2010 3:48 PM  WBC 12.9 RBC 4.02 HGB 12.60 g/dLHCT 37.40 %MCV 93.0 fLMCH 
31.30 pgMCHC 33.70 g/dLRDW CV 14.10 %MPV 9.50 fLPLT 263 %NEUT 69.20 %%LYMP 
22.30 %%MONO 7.60 %%EOS 0.70 %%BASO 0.20 %#NEUT 8.91 #LYMP 2.88 #MONO 0.98 #EOS 
0.09 #BASO 0.03 

 

 10/22/2010 8:21 PM  COLOR YELLOW APPEARANCE CLEAR SPEC GRAV 1.010 pH 6.5 
PROTEIN NEGATIVE GLUCOSE NEGATIVE KETONE 15 BILIRUBIN NEGATIVE BLOOD NEGATIVE 
NITRITE NEGATIVE LEUK SCREEN SMALL CASTS/LPF NEGATIVE CRYSTALS NEGATIVE MUCOUS 
THRDS NEGATIVE BACTERIA 2++ EPITH CELLS 3+++ SQUAMOUS TRICHOMONAS NEGATIVE 
YEAST NEGATIVE 

 

 2010 11:58 PM  COLOR YELLOW APPEARANCE CLEAR SPEC GRAV 1.015 pH 6.5 
PROTEIN NEGATIVE GLUCOSE NEGATIVE KETONE NEGATIVE BILIRUBIN NEGATIVE BLOOD 
NEGATIVE NITRITE NEGATIVE LEUK SCREEN LARGE CASTS/LPF NEGATIVE CRYSTALS 
NEGATIVE MUCOUS THRDS NEGATIVE BACTERIA 2++ EPITH CELLS 2++ SQUAMOUS 
TRICHOMONAS NEGATIVE YEAST NEGATIVE 

 

 11/15/2010 5:30 PM  .0 IU/LURIC ACID 3.7 mg/dLGLUCOSE 66.0 mg/dLSODIUM 
137.0 mmol/LPOTASSIUM 3.10 mmol/LCHLORIDE 105.0 mmol/LCO2 21.0 mmol/LBUN 3.0 mg/
dLCREATININE 0.50 mg/dLSGOT/AST 20.0 IU/LSGPT/ALT 18.0 IU/LALK PHOS 152.0 IU/
LTOTAL PROTEIN 6.20 g/dLALBUMIN 3.40 g/dLTOTAL BILI 0.60 mg/dLCALCIUM 9.10 mg/
dLeGFR >60 mL/min/1.73 m2WBC 16.9 RBC 4.18 HGB 13.30 g/dLHCT 39.70 %MCV 95.0 
fLMCH 31.80 pgMCHC 33.50 g/dLRDW CV 14.0 %MPV 10.10 fLPLT 268 %NEUT 75.70 %%
LYMP 17.50 %%MONO 6.20 %%EOS 0.40 %%BASO 0.20 %#NEUT 12.82 #LYMP 2.97 #MONO 
1.05 #EOS 0.06 #BASO 0.03 

 

 11/15/2010 8:50 PM  PROTEIN UR 14.0 mg/dL

 

 2016 12:21 PM  Pap Smear Collected 

 

 2016 2:00 PM  WBC 9.8 RBC 4.60 HGB 13.60 g/dLHCT 41.40 %MCV 90.0 fLMCH 
29.60 pgMCHC 32.90 g/dLRDW CV 13.80 %MPV 9.70 fLPLT 271 %NEUT 59.80 %%LYMP 33.0 
%%MONO 6.40 %%EOS 0.60 %%BASO 0.20 %#NEUT 5.83 #LYMP 3.22 #MONO 0.62 #EOS 0.06 #
BASO 0.02 HIV AG/AB COMBO 0.14 RPR Non Reactive Rubella Antibodies, IgG 2.88 
Index

 

 2016 2:08 PM  COLOR YELLOW APPEARANCE CLEAR SPEC GRAV <=1.005 pH 6.0 
PROTEIN NEGATIVE GLUCOSE NEGATIVE mg/dLKETONE NEGATIVE BILIRUBIN NEGATIVE BLOOD 
NEGATIVE NITRITE NEGATIVE LEUK SCREEN TRACE CASTS/LPF NEGATIVE /LPFCRYSTALS 
NEGATIVE MUCOUS THRDS NEGATIVE BACTERIA NEGATIVE EPITH CELLS 3+++ SQUAMOUS /
HPFTRICHOMONAS NEGATIVE YEAST NEGATIVE 

 

 3/28/2016 2:18 PM  AFP Value 0.0240 ug/mLAFP MoM 1.02 hCG Value 21335.0 mIU/
mLhCG MoM 1.77 uE3 Value 0.70 ng/mLuE3 MoM 1.16 MARISA Value 207.520 pg/mLDIA MoM 
1.25 OSBR Risk       1 IN 61705 DSR (Second Trimester) 1IN 4435 DSR (By Age)    
1 IN 1003 T18 Risk Not increased T18 (By Age) 1:3907 







History Of Immunizations







 Name  Date Admin  Mfg Name  Mfg Code  Trade Name  Lot#  Route  Inj  Vis Given  
Vis Pub  CVX

 

 HPV  2011  [employer]  MSD  GARDASIL  [id number]  Intramuscular  Left 
Deltoid  2011  999

 

 HPV  2011  [employer]  MSD  GARDASIL  [id number]  Intramuscular  Left 
Deltoid  2011  62







History of Past Illness







 Name  Date of Onset  Comments

 

 Atrial Septal Defect, Ostium Secundum Type      

 

 Prenatal Care, High Risk Pregnancy  2010   

 

    Apr 15 2010  4:29PM   

 

 Prenatal Care, High Risk Pregnancy  Apr 15 2010  4:13PM   

 

    2010  9:54AM   

 

 Prenatal Care, High Risk Pregnancy  2010 10:10AM   

 

    May 13 2010  9:01AM   

 

    John 10 2010  2:45PM   

 

    2010  4:16PM   

 

 Pregnancy, First Normal  Aug 30 2010  9:14AM   

 

 Pregnancy, High Risk  Sep 13 2010  3:13PM   

 

 Group B Strep Screening,   Oct 14 2010  5:43PM   

 

 Pregnancy, High Risk  Oct 14 2010  5:43PM   

 

 Postoperative Examination Following Surgery  Dec  6 2010  9:15AM   

 

 IMPLANON  Insertion  Dec 15 2010 10:44AM   

 

 General Medical Exam, Child  2011  8:58AM   

 

 Contraceptive Management  2011  8:58AM   

 

 Routine gynecological examination  Jul 15 2011 10:03AM   

 

 Contraceptive Counseling  Jul 15 2011 10:03AM   

 

 Vaginal Discharge  Jul 15 2011 10:03AM   

 

 Irregular Menses  Jul 15 2011 10:03AM   

 

 Gardsil (HPV)  2011  8:54AM   

 

 Irregular Menses  Aug 31 2011  9:07AM   

 

 Family Planning  Aug 31 2011  9:07AM   

 

 Contraceptive Management  Andreas  3 2012  9:43AM   

 

 Local Infection  Andreas  3 2012  9:43AM   

 

 Well Child Examination  2012  1:46PM   

 

 Right knee pain  2012  1:46PM   

 

 Right knee pain  2012  1:24PM   

 

 Abscess  2012  1:24PM   

 

 Right knee pain  Mar 26 2012 10:32AM   

 

 General Medical Exam, Adult  2012  1:46PM   

 

 Pregnancy test confirmed positive  2016 11:37AM   

 

 Obesity  2016 11:37AM   

 

 Normal pregnancy in multigravida in second trimester  Mar 28 2016  2:01PM   







Payers







 Insurance Name  Company Name  Plan Name  Plan Number  Policy Number  Policy 
Group Number  Start Date

 

    [facility]  
   [id number]     N/A

 

    [place] [employer]  [place] [employer]     
[id number]     N/A

 

    [id number] Medicare A  Test Medicare A     void     N/A







History of Encounters







 Visit Date  Visit Type  Provider

 

 2016  Office visit  Dr. Antonella Ventura MD

 

 3/28/2016  Office visit  Dr. Antonella Ventura MD

 

 2016  Office visit  Dr. Antonella Ventura MD

 

 2016  Office visit  Dr. Antonella Ventura MD

 

 2016  Office visit  Velma Mendieta APRN

 

 3/26/2012  Office visit  Ivanna Swan APRN

 

 2012  Office visit  Ivanna Swan APRN

 

 2012  Office visit  Ivanna Swan APRN

 

 1/3/2012  Office visit  Ivanna Swan APRN

 

 2011  Nurse visit  Ivanna Swan APRN

 

 2011  Nurse visit  Ivanna Swan APRN

 

 7/15/2011  Office visit  Ivanna Swan APRN

 

 2011  Office visit  Ivanna Swna APRN

 

 12/15/2010  Office visit  Willard Sorensen MD

 

 2010  Surgery  Willard Sorensen MD

 

 2010  [facility]  Willard Sorensen MD

 

 11/15/2010  Office visit  Willard Sorensen MD

 

 11/15/2010  [facility]  Willard Sorensen MD

 

 2010  Office visit  Willard Sorensen MD

 

 2010  Office visit  Willard Sorensen MD

 

 10/28/2010  Office visit  Willard Sorensen MD

 

 10/21/2010  Office visit  Willard Sorensen MD

 

 10/14/2010  Office visit  Willard Sorensen MD

 

 10/9/2010  [facility]  Willard Sorensen MD

 

 10/7/2010  Office visit  Willard Sorensen MD

 

 2010  Office visit  Willard Sorensen MD

 

 2010  Office visit  Willard Sorensen MD

 

 2010  Office visit  Willard Sorensen MD

 

 2010  Office visit  Willard Sorensen MD

 

 2010  Office visit  Willard Sorensen MD

 

 6/10/2010  Office visit  Willard Sorensen MD

 

 2010  Office visit  Willard Sorensen MD

 

 2010  Office visit  Willard Sorensen MD

 

 4/15/2010  Office visit  Willard Sorensen MD

## 2018-11-29 NOTE — XMS REPORT
Patient Summary (HL7 CCD)

 Created on: 2018



BLANCA GUTIÉRREZ

External Reference #: [id number]

: 1990

Sex: Female



Demographics







 Address  [address] [place]  [address]

 

 Home Phone  [phone]

 

 Preferred Language  Unknown

 

 Marital Status  Unknown

 

 Buddhism Affiliation  Unknown

 

 Race  White

 

 Ethnic Group  Not  or 





Author







 Author  DILSHAD RIVERA  Unknown

 

 Address  [address]

[place]  [address]



 

 Phone  [phone]







Care Team Providers







 Care Team Member Name  Role  Phone

 

 HETAL VAUGHN, SUNI LIZAMA  Attphys  [phone]



                                                                



Allergies

          





 Allergy        Code        Allergy Type        Reaction        Status    

 

 No Known Allergies        0        Drug allergy                 Active    



                                                                               
         



Active Medications

          





 Medication        Code        Dose        Units        Frequency        Route 
       Modification        Start Date/Time    

 

 Ibuprofen 800MG Oral Tablet        191316        800        MILLIGRAMS        
EVERY 8 HOURS        BY MOUTH                 2016 08:41    

 

 Prescription Detail         800 MILLIGRAMS BY MOUTH EVERY 8 HOURS     

 

 oxyCODONE HCl-acetaminophen 5MG-325MG Oral Tablet        [id number]        1     
   TABLET        AS NEEDED EVERY 6 HR        BY MOUTH                 2016 08:41    

 

 Prescription Detail         1 TABLET BY MOUTH AS NEEDED EVERY 6 HR     

 

 PrePlus 27MG-1MG Oral Tablet        [id number]        1        EACH        DAILY 
       BY MOUTH                 2016 08:41    

 

 Prescription Detail         1 EACH BY MOUTH DAILY     

 

 Docusate Sodium 100MG Oral Capsule        [id number]        100        MILLIGRAMS
        DAILY        BY MOUTH                 2016 08:40    

 

 Prescription Detail         100 MILLIGRAMS BY MOUTH DAILY while taking 
percocet     

 

 Ferrous Sulfate 325MG Oral Tablet        [id number]        325        MILLIGRAMS  
      BEFORE TWO MEALS        BY MOUTH                 2016 08:40    

 

 Prescription Detail         325 MILLIGRAMS BY MOUTH BEFORE TWO MEALS     



                                                                               
                                                 



Problems

          





 Problem        Code        Start Date        Resolved Date        Status    

 

 Deliveries by         [id number]        2016                 
Active    



                                                                               
         



Procedures

          





 Procedure        Code        Procedure Type        Date    

 

 ELBOW; 3VWS        84531308        SNOMED CT        2017    



                                                                               
         



Results

          Unknown or Not Available.                                            
                                  



Encounters

          





 Encounter Diagnosis        Diagnosis Code        Start Date    

 

 Pain in left arm        Q01225        2017    



                                                                               
         



Function Status

          Unknown or Not Available.                                            
                                  



History of Immunizations

          





 Immunization        Code        Date    

 

 DTP        1991    

 

 DTP        1991    

 

 OPV        02        1991    

 

 OPV        1991    

 

 influenza, split (incl. purified surface antigen)        15        2010 
   

 

 Hep B, adolescent/high risk infant        42        1991    

 

 Hep B, adolescent/high risk infant        42        1991    

 

 Hep B, adult        43        2017    

 

 Hib (PRP-OMP)        49        1991    

 

 Hib (PRP-OMP)        49        1991    

 

 Hep A, adult        52        1996    

 

 HPV, quadrivalent        62        2011    

 

 HPV, quadrivalent        62        2011    

 

 Tdap        115        2016    

 

 Tdap        115        2016    

 

 Novel influenza-H1N1-09        127        2009    



                                                                               
                                                                               
                                                                      



Plan of Treatment

          Unknown or Not Available.                                            
                        



Social History

          





 Smoking Status        Code        Start Date        End Date    

 

 Never smoker        [id number]                      



                                                                               
         



Vital Signs

          Unknown or Not Available.                                            
                        



Function Status

          Unknown or Not Available.                                            
    



Goals

          Unknown or Not Available.                                            
              



ASSESSMENTS

          Unknown or Not Available.                                            
                        



Health Concerns Section

          Unknown or Not Available.

## 2018-11-29 NOTE — XMS REPORT
MU2 Ambulatory Summary

 Created on: 2016



Radha Aviles

External Reference #: [id number]

: 1990

Sex: Female



Demographics







 Address  [address]

[place]  [address]

 

 Home Phone  [phone]

 

 Preferred Language  English

 

 Marital Status  Never 

 

 [employer] Affiliation  Unknown

 

 Race  White

 

 Ethnic Group  Not  or 





Author







 Author  Antonella Ventura

 

 [facility]

 

 Address  [address]

[place], [place]  [id number]



 

 Phone  [phone]







Care Team Providers







 Care Team Member Name  Role  Phone

 

 Antonella Ventura  PCP  Unavailable







Allergies and Adverse Reactions







 Name  Reaction  Notes

 

 NO KNOWN DRUG ALLERGIES      







Plan of Treatment

Not available.



Medications







  

 

 Name  Start Date  Expiration Date  SIG  Comments

 

 Nata[place] 60 mg iron-1 mg oral tablet  4/15/2010  3/11/2011  take 1 tablet by 
oral route once daily for 30 days   

 

 Diflucan 150 mg oral tablet  2010  take 1 tablet (150 mg) by 
oral route once  for 1 days   

 

 Premarin 1.25 mg oral tablet  2011  take 1 tablet by oral route 
daily for 10 days   

 

 doxycycline hyclate 100 mg oral tablet  7/15/2011  2011  take 1 tablet (
100 mg) by oral route every 12 hours for 10 days   

 

 ibuprofen 800 mg oral tablet  2012  take 1 tablet by oral route 
3 times a day for 30 days   

 

 Bactrim -160 mg oral tablet  2012  take 1 tablet by oral 
route every 12 hours for 7 days   

 

 Macrobid 100 mg oral capsule  2016  take 1 capsule (100 mg) by 
oral route 2 times per day with food for 7 days   









 Discontinued 

 

 Name  Start Date  Discontinued Date  SIG  Comments

 

 atenolol 25 mg oral tablet     2011  take 1/2 tablet by oral route QD   

 

 Medrol (Antwan) 4 mg oral tablets,dose pack  2012  3/26/2012  take as 
directed   







Problem List







 Description  Status  Onset

 

 Prenatal Care, High Risk Pregnancy  Active  2010







Vital Signs







 Date  Time  BP-Sys(mm[Hg]  BP-Marisa(mm[Hg])  HR(bpm)  RR(rpm)  Temp  WT  HT  HC  
BMI  BSA  BMI Percentile  O2 Sat(%)

 

 2016  11:15:00 AM  130 mmHg  70 mmHg  80 bpm  20 rpm     214 lbs  64 in  
   36.73 kg/m2  2.09 m2      

 

 3/26/2012  10:30:00 AM  126 mmHg  62 mmHg  106 bpm  18 rpm  96.8 F  205 lbs  
64 in     35.1878 kg/m  2.0491 m     97 %

 

 2012  1:22:00 PM  120 mmHg  68 mmHg  66 bpm  18 rpm  97 F  202.375 lbs  
64 in     34.74 kg/m2  2.04 m2      

 

 2012  1:44:00 PM  122 mmHg  64 mmHg  68 bpm  18 rpm  98.2 F  198.125 lbs  
64 in     34.0077 kg/m  2.0145 m      

 

 1/3/2012  9:40:00 AM  103 mmHg  77 mmHg  64 bpm  18 rpm  97.7 F  197.125 lbs  
64 in     33.84 kg/m2  2.01 m2      

 

 2011  9:06:00 AM  118 mmHg  68 mmHg  74 bpm     97.4 F  189 lbs          
        

 

 7/15/2011  10:06:00 AM  126 mmHg  70 mmHg  73 bpm  20 rpm  97.9 F  180.5 lbs  
64 in     30.98 kg/m2  1.92 m2  0 %  99 %

 

 2011  8:56:00 AM  96 mmHg  68 mmHg  66 bpm  20 rpm  97.5 F  177.5 lbs  64 
in     30.4675 kg/m  1.9067 m  0 %  98 %

 

 12/15/2010  10:43:00 AM  104 mmHg  64 mmHg  48 bpm     97.8 F  150 lbs        
          

 

 2010  9:15:00 AM  112 mmHg  72 mmHg  49 bpm     98 F  149 lbs  64 in     
25.5755 kg/m  1.747 m  81.5 %   

 

 2010  8:54:00 AM  117 mmHg  65 mmHg  73 bpm  20 rpm  97.8 F  160.5 lbs   
               

 

 2010  9:42:00 AM  134 mmHg  66 mmHg  66 bpm        161 lbs               
   

 

 4/15/2010  4:02:00 PM  135 mmHg  71 mmHg  77 bpm        162 lbs  64 in     
27.81 kg/m2  1.82 m2  89.9 %   







Social History







 Name  Description  Comments

 

 History of tobacco usage     Quit smoking in January - was smoking 1-2cig/day

 

 denies alcohol use      

 

 Tobacco  Former smoker   







History of Procedures







 Date Ordered  Description  Order Status

 

 2011 12:00 AM  URINE PREGNANCY TEST  Reviewed

 

 2011 12:00 AM  IMMUNIZATION ADMIN  Reviewed

 

 2011 12:00 AM  HPV VACCINE 4 VALENT IM  Reviewed

 

 2016 12:00 AM  CYTOPATH C/V THIN LAYER  Returned

 

 2016 12:00 AM  SPECIMEN HANDLING OFFICE-LAB  Reviewed

 

 2016 12:00 AM  N.GONORRHOEAE DNA AMP PROB  Returned

 

 2016 12:00 AM  CHLAMYDIA CULTURE  Returned

 

 2016 12:00 AM  HIV-1ANTIBODY  Returned

 

 2016 12:00 AM  URINALYSIS AUTO W/SCOPE  Returned

 

 2016 12:00 AM  OBSTETRIC PANEL  Returned

 

 2016 12:00 AM  GLUCOSE TOLERANCE TEST (GTT)  Returned

 

 3/28/2016 12:00 AM  ALPHA-FETOPROTEIN SERUM  Returned

 

 3/28/2016 12:00 AM  CHORIONIC GONADOTROPIN TEST  Returned

 

 3/28/2016 12:00 AM  CHORIONIC GONADOTROPIN ASSAY  Returned

 

 5/3/2016 12:00 AM  OB US >/=14 WKS SNGL FETUS  Returned

 

 2016 12:00 AM  RH IG FULL-DOSE IM  Returned

 

 2016 12:00 AM  Type and screen  Returned

 

 2016 12:00 AM  GLUCOSE TOLERANCE TEST (GTT)  Returned

 

 2016 12:00 AM  COMPLETE CBC W/AUTO DIFF WBC  Returned

 

 2012 12:00 AM  X-RAY EXAM KNEE 4 OR MORE  Returned

 

 2016 12:00 AM  TDAP VACCINE 7 YRS/> IM  Reviewed

 

 2016 12:00 AM  IMMUNIZATION ADMIN  Reviewed

 

 4/15/2010 12:00 AM  OBSTETRIC PANEL  Reviewed

 

 4/15/2010 12:00 AM  HIV-1ANTIBODY  Reviewed

 

 4/15/2010 12:00 AM  URINALYSIS NONAUTO W/SCOPE  Reviewed

 

 4/15/2010 12:00 AM  OB US < 14 WKS SINGLE FETUS  Reviewed

 

 2010 12:00 AM  GLUCOSE TEST  Reviewed

 

 2010 12:00 AM  Rhogam  Reviewed

 

 2010 12:00 AM  Type and screen  Reviewed

 

 2010 12:00 AM  COMPLETE CBC W/AUTO DIFF WBC  Reviewed

 

 2010 12:00 AM  COMPLETE CBC W/AUTO DIFF WBC  Reviewed

 

 10/14/2010 12:00 AM  CULTURE OTHR SPECIMN AEROBIC  Reviewed

 

 12/15/2010 12:00 AM  INSERTION IMPLANON, IMPLANTABLE CONTRACEPTIVE CAPSULES  
Reviewed

 

 12/15/2010 12:00 AM  Etonogestrel (contraceptive) implant system, (Implanon)  
Reviewed

 

 7/15/2011 12:00 AM  CYTOPATH TBS C/V MANUAL  Reviewed

 

 7/15/2011 12:00 AM  CHLAMYDIA CULTURE  Reviewed

 

 7/15/2011 12:00 AM  N.GONORRHOEAE DNA AMP PROB  Reviewed

 

 7/15/2011 12:00 AM  URINE PREGNANCY TEST  Reviewed

 

 2011 12:00 AM  IMMUNIZATION ADMIN  Reviewed

 

 2011 12:00 AM  HPV VACCINE 4 VALENT IM  Reviewed







Results Summary







 Data and Description  Results

 

 2010 10:41 AM  RUBELLA 34.0 IU/mLCOLOR YELLOW APPEARANCE HAZY SPEC GRAV 
1.025 pH 7.0 PROTEIN TRACE GLUCOSE NEGATIVE KETONE 15 BILIRUBIN NEGATIVE BLOOD 
NEGATIVE NITRITE NEGATIVE LEUK SCREEN NEGATIVE CASTS/LPF NEGATIVE CRYSTALS 
TRACE AMORPH MUCOUS THRDS FEW BACTERIA NEGATIVE EPITH CELLS FEW SQUAMOUS 
TRICHOMONAS NEGATIVE YEAST NEGATIVE WBC 6.3 RBC 4.57 HGB 13.90 g/dLHCT 40.30 %
MCV 88.0 fLMCH 30.40 pgMCHC 34.50 g/dLMPV 9.20 fLPLT 258 %NEUT 65.10 %%LYMP 
28.30 %#NEUT 4.10 #LYMP 1.80 

 

 2010 3:48 PM  WBC 12.9 RBC 4.02 HGB 12.60 g/dLHCT 37.40 %MCV 93.0 fLMCH 
31.30 pgMCHC 33.70 g/dLRDW CV 14.10 %MPV 9.50 fLPLT 263 %NEUT 69.20 %%LYMP 
22.30 %%MONO 7.60 %%EOS 0.70 %%BASO 0.20 %#NEUT 8.91 #LYMP 2.88 #MONO 0.98 #EOS 
0.09 #BASO 0.03 

 

 10/22/2010 8:21 PM  COLOR YELLOW APPEARANCE CLEAR SPEC GRAV 1.010 pH 6.5 
PROTEIN NEGATIVE GLUCOSE NEGATIVE KETONE 15 BILIRUBIN NEGATIVE BLOOD NEGATIVE 
NITRITE NEGATIVE LEUK SCREEN SMALL CASTS/LPF NEGATIVE CRYSTALS NEGATIVE MUCOUS 
THRDS NEGATIVE BACTERIA 2++ EPITH CELLS 3+++ SQUAMOUS TRICHOMONAS NEGATIVE 
YEAST NEGATIVE 

 

 2010 11:58 PM  COLOR YELLOW APPEARANCE CLEAR SPEC GRAV 1.015 pH 6.5 
PROTEIN NEGATIVE GLUCOSE NEGATIVE KETONE NEGATIVE BILIRUBIN NEGATIVE BLOOD 
NEGATIVE NITRITE NEGATIVE LEUK SCREEN LARGE CASTS/LPF NEGATIVE CRYSTALS 
NEGATIVE MUCOUS THRDS NEGATIVE BACTERIA 2++ EPITH CELLS 2++ SQUAMOUS 
TRICHOMONAS NEGATIVE YEAST NEGATIVE 

 

 11/15/2010 5:30 PM  .0 IU/LURIC ACID 3.7 mg/dLGLUCOSE 66.0 mg/dLSODIUM 
137.0 mmol/LPOTASSIUM 3.10 mmol/LCHLORIDE 105.0 mmol/LCO2 21.0 mmol/LBUN 3.0 mg/
dLCREATININE 0.50 mg/dLSGOT/AST 20.0 IU/LSGPT/ALT 18.0 IU/LALK PHOS 152.0 IU/
LTOTAL PROTEIN 6.20 g/dLALBUMIN 3.40 g/dLTOTAL BILI 0.60 mg/dLCALCIUM 9.10 mg/
dLeGFR >60 mL/min/1.73 m2WBC 16.9 RBC 4.18 HGB 13.30 g/dLHCT 39.70 %MCV 95.0 
fLMCH 31.80 pgMCHC 33.50 g/dLRDW CV 14.0 %MPV 10.10 fLPLT 268 %NEUT 75.70 %%
LYMP 17.50 %%MONO 6.20 %%EOS 0.40 %%BASO 0.20 %#NEUT 12.82 #LYMP 2.97 #MONO 
1.05 #EOS 0.06 #BASO 0.03 

 

 11/15/2010 8:50 PM  PROTEIN UR 14.0 mg/dL

 

 2016 12:21 PM  Pap Smear Collected 

 

 2016 2:00 PM  WBC 9.8 RBC 4.60 HGB 13.60 g/dLHCT 41.40 %MCV 90.0 fLMCH 
29.60 pgMCHC 32.90 g/dLRDW CV 13.80 %MPV 9.70 fLPLT 271 %NEUT 59.80 %%LYMP 33.0 
%%MONO 6.40 %%EOS 0.60 %%BASO 0.20 %#NEUT 5.83 #LYMP 3.22 #MONO 0.62 #EOS 0.06 #
BASO 0.02 HIV AG/AB COMBO 0.14 RPR Non Reactive Rubella Antibodies, IgG 2.88 
Index

 

 2016 2:08 PM  COLOR YELLOW APPEARANCE CLEAR SPEC GRAV <=1.005 pH 6.0 
PROTEIN NEGATIVE GLUCOSE NEGATIVE mg/dLKETONE NEGATIVE BILIRUBIN NEGATIVE BLOOD 
NEGATIVE NITRITE NEGATIVE LEUK SCREEN TRACE CASTS/LPF NEGATIVE /LPFCRYSTALS 
NEGATIVE MUCOUS THRDS NEGATIVE BACTERIA NEGATIVE EPITH CELLS 3+++ SQUAMOUS /
HPFTRICHOMONAS NEGATIVE YEAST NEGATIVE 

 

 3/28/2016 2:18 PM  AFP Value 0.0240 ug/mLAFP MoM 1.02 hCG Value 07753.0 mIU/
mLhCG MoM 1.77 uE3 Value 0.70 ng/mLuE3 MoM 1.16 MARISA Value 207.520 pg/mLDIA MoM 
1.25 OSBR Risk       1 IN 9[address]31 DSR (Second Trimester) 1IN 4435 DSR (By Age)    
1 IN 1003 T18 Risk Not increased T18 (By Age) 1:3907 

 

 2016 3:15 PM  WBC 11.5 RBC 3.75 HGB 11.70 g/dLHCT 35.50 %MCV 95.0 fLMCH 
31.20 pgMCHC 33.0 g/dLRDW CV 14.10 %MPV 9.70 fLPLT 273 %NEUT 73.0 %%LYMP 20.10 %
%MONO 5.70 %%EOS 0.80 %%BASO 0.10 %#NEUT 8.40 #LYMP 2.31 #MONO 0.65 #EOS 0.09 #
BASO 0.01 







History Of Immunizations







 Name  Date Admin  Mfg Name  Mfg Code  Trade Name  Lot#  Route  Inj  Vis Given  
Vis Pub  CVX

 

 HPV  2011  [employer]  MSD  GARDASIL  [id number]  Intramuscular  Left 
Deltoid  2011  999

 

 HPV  2011  [employer]  MSD  GARDASIL  [id number]  Intramuscular  Left 
Deltoid  2011  62

 

 Tdap  2016  [employer]  SKB  BOOSTRIX  [id number]  Intramuscular  Right 
Deltoid  2016  115







History of Past Illness







 Name  Date of Onset  Comments

 

 Atrial Septal Defect, Ostium Secundum Type      

 

 Prenatal Care, High Risk Pregnancy  2010   

 

    Apr 15 2010  4:29PM   

 

 Prenatal Care, High Risk Pregnancy  Apr 15 2010  4:13PM   

 

    2010  9:54AM   

 

 Prenatal Care, High Risk Pregnancy  2010 10:10AM   

 

    May 13 2010  9:01AM   

 

    John 10 2010  2:45PM   

 

    2010  4:16PM   

 

 Pregnancy, First Normal  Aug 30 2010  9:14AM   

 

 Pregnancy, High Risk  Sep 13 2010  3:13PM   

 

 Group B Strep Screening,   Oct 14 2010  5:43PM   

 

 Pregnancy, High Risk  Oct 14 2010  5:43PM   

 

 Postoperative Examination Following Surgery  Dec  6 2010  9:15AM   

 

 IMPLANON  Insertion  Dec 15 2010 10:44AM   

 

 General Medical Exam, Child  2011  8:58AM   

 

 Contraceptive Management  2011  8:58AM   

 

 Routine gynecological examination  Jul 15 2011 10:03AM   

 

 Contraceptive Counseling  Jul 15 2011 10:03AM   

 

 Vaginal Discharge  Jul 15 2011 10:03AM   

 

 Irregular Menses  Jul 15 2011 10:03AM   

 

 Gardsil (HPV)  2011  8:54AM   

 

 Irregular Menses  Aug 31 2011  9:07AM   

 

 Family Planning  Aug 31 2011  9:07AM   

 

 Contraceptive Management  Andreas  3 2012  9:43AM   

 

 Local Infection  Andreas  3 2012  9:43AM   

 

 Well Child Examination  2012  1:46PM   

 

 Right knee pain  2012  1:46PM   

 

 Right knee pain  2012  1:24PM   

 

 Abscess  2012  1:24PM   

 

 Right knee pain  Mar 26 2012 10:32AM   

 

 General Medical Exam, Adult  2012  1:46PM   

 

 Pregnancy test confirmed positive  2016 11:37AM   

 

 Obesity  2016 11:37AM   

 

 Normal pregnancy in multigravida in second trimester  Mar 28 2016  2:01PM   

 

 History of atrial septal defect repair  May 11 2016  8:50AM   

 

 Normal pregnancy in multigravida in second trimester  2016  2:00PM   

 

 Need for Tdap vaccine  2016  1:38PM   







Payers







 Insurance Name  Company Name  Plan Name  Plan Number  Policy Number  Policy 
Group Number  Start Date

 

    [facility] Plan of [facility]  
   [id number]     N/A

 

    [facility]  [employer]     
[id number]     N/A

 

    zzzTest Medicare A  Test Medicare A     void     N/A







History of Encounters







 Visit Date  Visit Type  Provider

 

 2016  Office visit  Dr. Antonella Ventura MD

 

 2016  Office visit  Dr. Antonella Ventura MD

 

 2016  Office visit  Dr. Antonella Ventura MD

 

 3/28/2016  Office visit  Dr. Antonella Ventura MD

 

 2016  Office visit  Dr. Antonella Ventura MD

 

 2016  Office visit  Dr. Antonella Ventura MD

 

 2016  Office visit  Velma Mendieta APRN

 

 3/26/2012  Office visit  Ivanna Swan APRN

 

 2012  Office visit  Ivanna Swan APRN

 

 2012  Office visit  Ivanna Swan APRN

 

 1/3/2012  Office visit  Ivanna Swan APRN

 

 2011  Nurse visit  Ivanna Swan APRN

 

 2011  Nurse visit  Ivanna Swan APRN

 

 7/15/2011  Office visit  Ivanna Swan APRN

 

 2011  Office visit  Ivanna Swan APRN

 

 12/15/2010  Office visit  Willard Sorensen MD

 

 2010  Surgery  Willard Sorensen MD

 

 2010  Hospital  Willard Sorensen MD

 

 11/15/2010  Office visit  Willard Sorensen MD

 

 11/15/2010  [facility]  Willard Sorensen MD

 

 2010  Office visit  Willard Sorensen MD

 

 2010  Office visit  Willard Sorensen MD

 

 10/28/2010  Office visit  Willard Sorensen MD

 

 10/21/2010  Office visit  Willard Sorensen MD

 

 10/14/2010  Office visit  Willard Sorensen MD

 

 10/9/2010  [facility]  Willard Sorensen MD

 

 10/7/2010  Office visit  Willard Sorensen MD

 

 2010  Office visit  Willard Sorensen MD

 

 2010  Office visit  Willard Sorensen MD

 

 2010  Office visit  Willard Sorensen MD

 

 2010  Office visit  Willard Sorensen MD

 

 2010  Office visit  Willard Sorensen MD

 

 6/10/2010  Office visit  Willard Sorensen MD

 

 2010  Office visit  Willard Sorensen MD

 

 2010  Office visit  Willard Sorensen MD

 

 4/15/2010  Office visit  Willard Sorensen MD

## 2018-11-29 NOTE — XMS REPORT
MU2 Ambulatory Summary

 Created on: 2016



Radha Aviles

External Reference #: [id number]

: 1990

Sex: Female



Demographics







 Address  [address]

[place]  [address]

 

 Home Phone  [phone]

 

 Preferred Language  English

 

 Marital Status  Never 

 

 Hindu Affiliation  Unknown

 

 Race  White

 

 Ethnic Group  Not  or 





Author







 Author  Megha Piedra

 

 Organization  [facility]

 

 Address  [address]

[place], [place]  [address]



 

 Phone  [phone]







Care Team Providers







 Care Team Member Name  Role  Phone

 

 Megha Piedra  PCP  Unavailable







Allergies and Adverse Reactions







 Name  Reaction  Notes

 

 NO KNOWN DRUG ALLERGIES      







Plan of Treatment

Not available.



Medications







  

 

 Name  Start Date  Expiration Date  SIG  Comments

 

 NataFort 60 mg iron-1 mg oral tablet  4/15/2010  3/11/2011  take 1 tablet by 
oral route once daily for 30 days   

 

 Diflucan 150 mg oral tablet  2010  take 1 tablet (150 mg) by 
oral route once  for 1 days   

 

 Premarin 1.25 mg oral tablet  2011  take 1 tablet by oral route 
daily for 10 days   

 

 doxycycline hyclate 100 mg oral tablet  7/15/2011  2011  take 1 tablet (
100 mg) by oral route every 12 hours for 10 days   

 

 ibuprofen 800 mg oral tablet  2012  take 1 tablet by oral route 
3 times a day for 30 days   

 

 Bactrim -160 mg oral tablet  2012  take 1 tablet by oral 
route every 12 hours for 7 days   

 

 Macrobid 100 mg oral capsule  2016  take 1 capsule (100 mg) by 
oral route 2 times per day with food for 7 days   









 Discontinued 

 

 Name  Start Date  Discontinued Date  SIG  Comments

 

 atenolol 25 mg oral tablet     2011  take 1/2 tablet by oral route QD   

 

 Medrol (Antwan) 4 mg oral tablets,dose pack  2012  3/26/2012  take as 
directed   







Problem List







 Description  Status  Onset

 

 Prenatal Care, High Risk Pregnancy  Active  2010







Vital Signs







 Date  Time  BP-Sys(mm[Hg]  BP-Marisa(mm[Hg])  HR(bpm)  RR(rpm)  Temp  WT  HT  HC  
BMI  BSA  BMI Percentile  O2 Sat(%)

 

 2016  11:15:00 AM  130 mmHg  70 mmHg  80 bpm  20 rpm     214 lbs  64 in  
   36.73 kg/m2  2.09 m2      

 

 3/26/2012  10:30:00 AM  126 mmHg  62 mmHg  106 bpm  18 rpm  96.8 F  205 lbs  
64 in     35.1878 kg/m  2.0491 m     97 %

 

 2012  1:22:00 PM  120 mmHg  68 mmHg  66 bpm  18 rpm  97 F  202.375 lbs  
64 in     34.74 kg/m2  2.04 m2      

 

 2012  1:44:00 PM  122 mmHg  64 mmHg  68 bpm  18 rpm  98.2 F  198.125 lbs  
64 in     34.0077 kg/m  2.0145 m      

 

 1/3/2012  9:40:00 AM  103 mmHg  77 mmHg  64 bpm  18 rpm  97.7 F  197.125 lbs  
64 in     33.84 kg/m2  2.01 m2      

 

 2011  9:06:00 AM  118 mmHg  68 mmHg  74 bpm     97.4 F  189 lbs          
        

 

 7/15/2011  10:06:00 AM  126 mmHg  70 mmHg  73 bpm  20 rpm  97.9 F  180.5 lbs  
64 in     30.98 kg/m2  1.92 m2  0 %  99 %

 

 2011  8:56:00 AM  96 mmHg  68 mmHg  66 bpm  20 rpm  97.5 F  177.5 lbs  64 
in     30.4675 kg/m  1.9067 m  0 %  98 %

 

 12/15/2010  10:43:00 AM  104 mmHg  64 mmHg  48 bpm     97.8 F  150 lbs        
          

 

 2010  9:15:00 AM  112 mmHg  72 mmHg  49 bpm     98 F  149 lbs  64 in     
25.5755 kg/m  1.747 m  81.5 %   

 

 2010  8:54:00 AM  117 mmHg  65 mmHg  73 bpm  20 rpm  97.8 F  160.5 lbs   
               

 

 2010  9:42:00 AM  134 mmHg  66 mmHg  66 bpm        161 lbs               
   

 

 4/15/2010  4:02:00 PM  135 mmHg  71 mmHg  77 bpm        162 lbs  64 in     
27.81 kg/m2  1.82 m2  89.9 %   







Social History







 Name  Description  Comments

 

 History of tobacco usage     Quit smoking in January - was smoking 1-2cig/day

 

 denies alcohol use      

 

 Tobacco  Former smoker   







History of Procedures







 Date Ordered  Description  Order Status

 

 2011 12:00 AM  URINE PREGNANCY TEST  Reviewed

 

 2011 12:00 AM  IMMUNIZATION ADMIN  Reviewed

 

 2011 12:00 AM  HPV VACCINE 4 VALENT IM  Reviewed

 

 2016 12:00 AM  CYTOPATH C/V THIN LAYER  Returned

 

 2016 12:00 AM  SPECIMEN HANDLING OFFICE-LAB  Reviewed

 

 2016 12:00 AM  N.GONORRHOEAE DNA AMP PROB  Returned

 

 2016 12:00 AM  CHLAMYDIA CULTURE  Returned

 

 2016 12:00 AM  HIV-1ANTIBODY  Returned

 

 2016 12:00 AM  URINALYSIS AUTO W/SCOPE  Returned

 

 2016 12:00 AM  OBSTETRIC PANEL  Returned

 

 2016 12:00 AM  GLUCOSE TOLERANCE TEST (GTT)  Returned

 

 3/28/2016 12:00 AM  ALPHA-FETOPROTEIN SERUM  Returned

 

 3/28/2016 12:00 AM  CHORIONIC GONADOTROPIN TEST  Returned

 

 3/28/2016 12:00 AM  CHORIONIC GONADOTROPIN ASSAY  Returned

 

 5/3/2016 12:00 AM  OB US >/=14 WKS SNGL FETUS  Returned

 

 2016 12:00 AM  RH IG FULL-DOSE IM  Returned

 

 2016 12:00 AM  Type and screen  Returned

 

 2016 12:00 AM  GLUCOSE TOLERANCE TEST (GTT)  Returned

 

 2016 12:00 AM  COMPLETE CBC W/AUTO DIFF WBC  Returned

 

 2012 12:00 AM  X-RAY EXAM KNEE 4 OR MORE  Returned

 

 2016 12:00 AM  TDAP VACCINE 7 YRS/> IM  Reviewed

 

 2016 12:00 AM  IMMUNIZATION ADMIN  Reviewed

 

 2016 12:00 AM  CULTURE SCREEN ONLY  Returned

 

 4/15/2010 12:00 AM  OBSTETRIC PANEL  Reviewed

 

 4/15/2010 12:00 AM  HIV-1ANTIBODY  Reviewed

 

 4/15/2010 12:00 AM  URINALYSIS NONAUTO W/SCOPE  Reviewed

 

 4/15/2010 12:00 AM  OB US < 14 WKS SINGLE FETUS  Reviewed

 

 2010 12:00 AM  GLUCOSE TEST  Reviewed

 

 2010 12:00 AM  Rhogam  Reviewed

 

 2010 12:00 AM  Type and screen  Reviewed

 

 2010 12:00 AM  COMPLETE CBC W/AUTO DIFF WBC  Reviewed

 

 2010 12:00 AM  COMPLETE CBC W/AUTO DIFF WBC  Reviewed

 

 10/14/2010 12:00 AM  CULTURE OTHR SPECIMN AEROBIC  Reviewed

 

 12/15/2010 12:00 AM  INSERTION IMPLANON, IMPLANTABLE CONTRACEPTIVE CAPSULES  
Reviewed

 

 12/15/2010 12:00 AM  Etonogestrel (contraceptive) implant system, (Implanon)  
Reviewed

 

 7/15/2011 12:00 AM  CYTOPATH TBS C/V MANUAL  Reviewed

 

 7/15/2011 12:00 AM  CHLAMYDIA CULTURE  Reviewed

 

 7/15/2011 12:00 AM  N.GONORRHOEAE DNA AMP PROB  Reviewed

 

 7/15/2011 12:00 AM  URINE PREGNANCY TEST  Reviewed

 

 2011 12:00 AM  IMMUNIZATION ADMIN  Reviewed

 

 2011 12:00 AM  HPV VACCINE 4 VALENT IM  Reviewed







Results Summary







 Data and Description  Results

 

 2010 10:41 AM  RUBELLA 34.0 IU/mLCOLOR YELLOW APPEARANCE HAZY SPEC GRAV 
1.025 pH 7.0 PROTEIN TRACE GLUCOSE NEGATIVE KETONE 15 BILIRUBIN NEGATIVE BLOOD 
NEGATIVE NITRITE NEGATIVE LEUK SCREEN NEGATIVE CASTS/LPF NEGATIVE CRYSTALS 
TRACE AMORPH MUCOUS THRDS FEW BACTERIA NEGATIVE EPITH CELLS FEW SQUAMOUS 
TRICHOMONAS NEGATIVE YEAST NEGATIVE WBC 6.3 RBC 4.57 HGB 13.90 g/dLHCT 40.30 %
MCV 88.0 fLMCH 30.40 pgMCHC 34.50 g/dLMPV 9.20 fLPLT 258 %NEUT 65.10 %%LYMP 
28.30 %#NEUT 4.10 #LYMP 1.80 

 

 2010 3:48 PM  WBC 12.9 RBC 4.02 HGB 12.60 g/dLHCT 37.40 %MCV 93.0 fLMCH 
31.30 pgMCHC 33.70 g/dLRDW CV 14.10 %MPV 9.50 fLPLT 263 %NEUT 69.20 %%LYMP 
22.30 %%MONO 7.60 %%EOS 0.70 %%BASO 0.20 %#NEUT 8.91 #LYMP 2.88 #MONO 0.98 #EOS 
0.09 #BASO 0.03 

 

 10/22/2010 8:21 PM  COLOR YELLOW APPEARANCE CLEAR SPEC GRAV 1.010 pH 6.5 
PROTEIN NEGATIVE GLUCOSE NEGATIVE KETONE 15 BILIRUBIN NEGATIVE BLOOD NEGATIVE 
NITRITE NEGATIVE LEUK SCREEN SMALL CASTS/LPF NEGATIVE CRYSTALS NEGATIVE MUCOUS 
THRDS NEGATIVE BACTERIA 2++ EPITH CELLS 3+++ SQUAMOUS TRICHOMONAS NEGATIVE 
YEAST NEGATIVE 

 

 2010 11:58 PM  COLOR YELLOW APPEARANCE CLEAR SPEC GRAV 1.015 pH 6.5 
PROTEIN NEGATIVE GLUCOSE NEGATIVE KETONE NEGATIVE BILIRUBIN NEGATIVE BLOOD 
NEGATIVE NITRITE NEGATIVE LEUK SCREEN LARGE CASTS/LPF NEGATIVE CRYSTALS 
NEGATIVE MUCOUS THRDS NEGATIVE BACTERIA 2++ EPITH CELLS 2++ SQUAMOUS 
TRICHOMONAS NEGATIVE YEAST NEGATIVE 

 

 11/15/2010 5:30 PM  .0 IU/LURIC ACID 3.7 mg/dLGLUCOSE 66.0 mg/dLSODIUM 
137.0 mmol/LPOTASSIUM 3.10 mmol/LCHLORIDE 105.0 mmol/LCO2 21.0 mmol/LBUN 3.0 mg/
dLCREATININE 0.50 mg/dLSGOT/AST 20.0 IU/LSGPT/ALT 18.0 IU/LALK PHOS 152.0 IU/
LTOTAL PROTEIN 6.20 g/dLALBUMIN 3.40 g/dLTOTAL BILI 0.60 mg/dLCALCIUM 9.10 mg/
dLeGFR >60 mL/min/1.73 m2WBC 16.9 RBC 4.18 HGB 13.30 g/dLHCT 39.70 %MCV 95.0 
fLMCH 31.80 pgMCHC 33.50 g/dLRDW CV 14.0 %MPV 10.10 fLPLT 268 %NEUT 75.70 %%
LYMP 17.50 %%MONO 6.20 %%EOS 0.40 %%BASO 0.20 %#NEUT 12.82 #LYMP 2.97 #MONO 
1.05 #EOS 0.06 #BASO 0.03 

 

 11/15/2010 8:50 PM  PROTEIN UR 14.0 mg/dL

 

 2016 12:21 PM  Pap Smear Collected 

 

 2016 2:00 PM  WBC 9.8 RBC 4.60 HGB 13.60 g/dLHCT 41.40 %MCV 90.0 fLMCH 
29.60 pgMCHC 32.90 g/dLRDW CV 13.80 %MPV 9.70 fLPLT 271 %NEUT 59.80 %%LYMP 33.0 
%%MONO 6.40 %%EOS 0.60 %%BASO 0.20 %#NEUT 5.83 #LYMP 3.22 #MONO 0.62 #EOS 0.06 #
BASO 0.02 HIV AG/AB COMBO 0.14 RPR Non Reactive Rubella Antibodies, IgG 2.88 
Index

 

 2016 2:08 PM  COLOR YELLOW APPEARANCE CLEAR SPEC GRAV <=1.005 pH 6.0 
PROTEIN NEGATIVE GLUCOSE NEGATIVE mg/dLKETONE NEGATIVE BILIRUBIN NEGATIVE BLOOD 
NEGATIVE NITRITE NEGATIVE LEUK SCREEN TRACE CASTS/LPF NEGATIVE /LPFCRYSTALS 
NEGATIVE MUCOUS THRDS NEGATIVE BACTERIA NEGATIVE EPITH CELLS 3+++ SQUAMOUS /
HPFTRICHOMONAS NEGATIVE YEAST NEGATIVE 

 

 3/28/2016 2:18 PM  AFP Value 0.0240 ug/mLAFP MoM 1.02 hCG Value 72134.0 mIU/
mLhCG MoM 1.77 uE3 Value 0.70 ng/mLuE3 MoM 1.16 MARISA Value 207.520 pg/mLDIA MoM 
1.25 OSBR Risk       1 [place] 28619 DSR (Second Trimester) 1IN 4435 DSR (By Age)    
1 IN 1003 T18 Risk Not increased T18 (By Age) 1:3907 

 

 2016 3:15 PM  WBC 11.5 RBC 3.75 HGB 11.70 g/dLHCT 35.50 %MCV 95.0 fLMCH 
31.20 pgMCHC 33.0 g/dLRDW CV 14.10 %MPV 9.70 fLPLT 273 %NEUT 73.0 %%LYMP 20.10 %
%MONO 5.70 %%EOS 0.80 %%BASO 0.10 %#NEUT 8.40 #LYMP 2.31 #MONO 0.65 #EOS 0.09 #
BASO 0.01 







History Of Immunizations







 Name  Date Admin  Mfg Name  Mfg Code  Trade Name  Lot#  Route  Inj  Vis Given  
Vis Pub  CVX

 

 HPV  2011  [employer]  MSD  GARDASIL  [id number]  Intramuscular  Left 
Deltoid  2011  999

 

 HPV  2011  [employer]  MSD  GARDASIL  [id number]  Intramuscular  Left 
Deltoid  2011  62

 

 Tdap  2016  [employer]  SKB  BOOSTRIX  [id number]  Intramuscular  Right 
Deltoid  2016  115







History of Past Illness







 Name  Date of Onset  Comments

 

 Atrial Septal Defect, Ostium Secundum Type      

 

 Prenatal Care, High Risk Pregnancy  2010   

 

    Apr 15 2010  4:29PM   

 

 Prenatal Care, High Risk Pregnancy  Apr 15 2010  4:13PM   

 

    2010  9:54AM   

 

 Prenatal Care, High Risk Pregnancy  2010 10:10AM   

 

    May 13 2010  9:01AM   

 

    John 10 2010  2:45PM   

 

    2010  4:16PM   

 

 Pregnancy, First Normal  Aug 30 2010  9:14AM   

 

 Pregnancy, High Risk  Sep 13 2010  3:13PM   

 

 Group B Strep Screening,   Oct 14 2010  5:43PM   

 

 Pregnancy, High Risk  Oct 14 2010  5:43PM   

 

 Postoperative Examination Following Surgery  Dec  6 2010  9:15AM   

 

 IMPLANON  Insertion  Dec 15 2010 10:44AM   

 

 General Medical Exam, Child  2011  8:58AM   

 

 Contraceptive Management  2011  8:58AM   

 

 Routine gynecological examination  Jul 15 2011 10:03AM   

 

 Contraceptive Counseling  Jul 15 2011 10:03AM   

 

 Vaginal Discharge  Jul 15 2011 10:03AM   

 

 Irregular Menses  Jul 15 2011 10:03AM   

 

 Gardsil (HPV)  2011  8:54AM   

 

 Irregular Menses  Aug 31 2011  9:07AM   

 

 Family Planning  Aug 31 2011  9:07AM   

 

 Contraceptive Management  Andreas  3 2012  9:43AM   

 

 Local Infection  Andreas  3 2012  9:43AM   

 

 Well Child Examination  2012  1:46PM   

 

 Right knee pain  2012  1:46PM   

 

 Right knee pain  2012  1:24PM   

 

 Abscess  2012  1:24PM   

 

 Right knee pain  Mar 26 2012 10:32AM   

 

 General Medical Exam, Adult  2012  1:46PM   

 

 Pregnancy test confirmed positive  2016 11:37AM   

 

 Obesity  2016 11:37AM   

 

 Normal pregnancy in multigravida in second trimester  Mar 28 2016  2:01PM   

 

 History of atrial septal defect repair  May 11 2016  8:50AM   

 

 Normal pregnancy in multigravida in second trimester  2016  2:00PM   

 

 Need for Tdap vaccine  2016  1:38PM   

 

 Group B Strep Screening,   Aug 23 2016  3:43PM   

 

 Normal pregnancy in multigravida in third trimester  Aug 23 2016  3:43PM   







Payers







 Insurance Name  Company Name  Plan Name  Plan Number  Policy Number  Policy 
Group Number  Start Date

 

    [facility] beds [facility] the [facility]  
   [id number]     N/A

 

    [place] [employer]  [place] [employer]     
[id number]     N/A

 

    zzzTest Medicare A  Test Medicare A     void     N/A







History of Encounters







 Visit Date  Visit Type  Provider

 

 2016  Office visit  Megha Piedra DO

 

 2016  Office visit  Dr. Antonella Ventura MD

 

 2016  Office visit  Dr. Antonella Ventura MD

 

 2016  Office visit  Dr. Antonella Ventura MD

 

 2016  Office visit  Dr. Antonella Ventura MD

 

 2016  Office visit  Dr. Antonella Ventura MD

 

 2016  Office visit  Dr. Antonella Ventura MD

 

 3/28/2016  Office visit  Dr. Antonella Ventura MD

 

 2016  Office visit  Dr. Antonella Ventura MD

 

 2016  Office visit  Dr. Antonella Ventura MD

 

 2016  Office visit  Velma MAGDACynthia Mendieta APRN

 

 3/26/2012  Office visit  Ivanna Walker APRN

 

 2012  Office visit  Ivanna Walker APRN

 

 2012  Office visit  Ivanna Walker APRN

 

 1/3/2012  Office visit  Ivanna Walker APRN

 

 2011  Nurse visit  Ivanna Walker APRN

 

 2011  Nurse visit  Ivanna Walker APRN

 

 7/15/2011  Office visit  Ivanna Walker APRN

 

 2011  Office visit  Ivanna Beny APRN

 

 12/15/2010  Office visit  Willard Sorensen MD

 

 2010  Surgery  Willard Sorensen MD

 

 2010  Hospital  Willard Sorensen MD

 

 11/15/2010  Office visit  Willard Sorensne MD

 

 11/15/2010  [facility]  Willard Sorensen MD

 

 2010  Office visit  Willard Sorensen MD

 

 2010  Office visit  Willard Sorensen MD

 

 10/28/2010  Office visit  Willard Sorensen MD

 

 10/21/2010  Office visit  Willard Sorensen MD

 

 10/14/2010  Office visit  Willard Sorensen MD

 

 10/9/2010  [facility]  Willard Sorensen MD

 

 10/7/2010  Office visit  Willard Sorensen MD

 

 2010  Office visit  Willard Sorensen MD

 

 2010  Office visit  Willard Sorensen MD

 

 2010  Office visit  Willard Sorensen MD

 

 2010  Office visit  Willard Sorensen MD

 

 2010  Office visit  Willard Sorensen MD

 

 6/10/2010  Office visit  Willard Sorensen MD

 

 2010  Office visit  Willard Sorensen MD

 

 2010  Office visit  Willard Sorensen MD

 

 4/15/2010  Office visit  Willard Sorensen MD

## 2018-11-29 NOTE — XMS REPORT
MU2 Ambulatory Summary

 Created on: 2016



Radha Aviles

External Reference #: [id number]

: 1990

Sex: Female



Demographics







 Address  [address] [place], [place] [address] [address]

[place]  [address]

 

 Home Phone  [phone]

 

 Preferred Language  English

 

 Marital Status  Never 

 

 Mandaeism Affiliation  Unknown

 

 Race  White

 

 Ethnic Group  Not  or 





Author







 Author  Antonella Ventura

 

 [facility]

 

 Address  [address]

[place], [place]  [id number]



 

 Phone  [phone]







Care Team Providers







 Care Team Member Name  Role  Phone

 

 Antonella Ventura  PCP  Unavailable







Allergies and Adverse Reactions







 Name  Reaction  Notes

 

 NO KNOWN DRUG ALLERGIES      







Plan of Treatment

Not available.



Medications







  

 

 Name  Start Date  Expiration Date  SIG  Comments

 

 [place] 60 mg iron-1 mg oral tablet  4/15/2010  3/11/2011  take 1 tablet by 
oral route once daily for 30 days   

 

 Diflucan 150 mg oral tablet  2010  take 1 tablet (150 mg) by 
oral route once  for 1 days   

 

 Premarin 1.25 mg oral tablet  2011  take 1 tablet by oral route 
daily for 10 days   

 

 doxycycline hyclate 100 mg oral tablet  7/15/2011  2011  take 1 tablet (
100 mg) by oral route every 12 hours for 10 days   

 

 ibuprofen 800 mg oral tablet  2012  take 1 tablet by oral route 
3 times a day for 30 days   

 

 Bactrim -160 mg oral tablet  2012  take 1 tablet by oral 
route every 12 hours for 7 days   

 

 Macrobid 100 mg oral capsule  2016  take 1 capsule (100 mg) by 
oral route 2 times per day with food for 7 days   









 Discontinued 

 

 Name  Start Date  Discontinued Date  SIG  Comments

 

 atenolol 25 mg oral tablet     2011  take 1/2 tablet by oral route QD   

 

 Medrol (Antwan) 4 mg oral tablets,dose pack  2012  3/26/2012  take as 
directed   







Problem List







 Description  Status  Onset

 

 Prenatal Care, High Risk Pregnancy  Active  2010







Vital Signs







 Date  Time  BP-Sys(mm[Hg]  BP-Marisa(mm[Hg])  HR(bpm)  RR(rpm)  Temp  WT  HT  HC  
BMI  BSA  BMI Percentile  O2 Sat(%)

 

 2016  11:15:00 AM  130 mmHg  70 mmHg  80 bpm  20 rpm     214 lbs  64 in  
   36.73 kg/m2  2.09 m2      

 

 3/26/2012  10:30:00 AM  126 mmHg  62 mmHg  106 bpm  18 rpm  96.8 F  205 lbs  
64 in     35.1878 kg/m  2.0491 m     97 %

 

 2012  1:22:00 PM  120 mmHg  68 mmHg  66 bpm  18 rpm  97 F  202.375 lbs  
64 in     34.74 kg/m2  2.04 m2      

 

 2012  1:44:00 PM  122 mmHg  64 mmHg  68 bpm  18 rpm  98.2 F  198.125 lbs  
64 in     34.0077 kg/m  2.0145 m      

 

 1/3/2012  9:40:00 AM  103 mmHg  77 mmHg  64 bpm  18 rpm  97.7 F  197.125 lbs  
64 in     33.84 kg/m2  2.01 m2      

 

 2011  9:06:00 AM  118 mmHg  68 mmHg  74 bpm     97.4 F  189 lbs          
        

 

 7/15/2011  10:06:00 AM  126 mmHg  70 mmHg  73 bpm  20 rpm  97.9 F  180.5 lbs  
64 in     30.98 kg/m2  1.92 m2  0 %  99 %

 

 2011  8:56:00 AM  96 mmHg  68 mmHg  66 bpm  20 rpm  97.5 F  177.5 lbs  64 
in     30.4675 kg/m  1.9067 m  0 %  98 %

 

 12/15/2010  10:43:00 AM  104 mmHg  64 mmHg  48 bpm     97.8 F  150 lbs        
          

 

 2010  9:15:00 AM  112 mmHg  72 mmHg  49 bpm     98 F  149 lbs  64 in     
25.5755 kg/m  1.747 m  81.5 %   

 

 2010  8:54:00 AM  117 mmHg  65 mmHg  73 bpm  20 rpm  97.8 F  160.5 lbs   
               

 

 2010  9:42:00 AM  134 mmHg  66 mmHg  66 bpm        161 lbs               
   

 

 4/15/2010  4:02:00 PM  135 mmHg  71 mmHg  77 bpm        162 lbs  64 in     
27.81 kg/m2  1.82 m2  89.9 %   







Social History







 Name  Description  Comments

 

 History of tobacco usage     Quit smoking in January - was smoking 1-2cig/day

 

 denies alcohol use      

 

 Tobacco  Former smoker   







History of Procedures







 Date Ordered  Description  Order Status

 

 2011 12:00 AM  URINE PREGNANCY TEST  Reviewed

 

 2011 12:00 AM  IMMUNIZATION ADMIN  Reviewed

 

 2011 12:00 AM  HPV VACCINE 4 VALENT IM  Reviewed

 

 2016 12:00 AM  CYTOPATH C/V THIN LAYER  Returned

 

 2016 12:00 AM  SPECIMEN HANDLING OFFICE-LAB  Reviewed

 

 2016 12:00 AM  N.GONORRHOEAE DNA AMP PROB  Returned

 

 2016 12:00 AM  CHLAMYDIA CULTURE  Returned

 

 2016 12:00 AM  HIV-1ANTIBODY  Returned

 

 2016 12:00 AM  URINALYSIS AUTO W/SCOPE  Returned

 

 2016 12:00 AM  OBSTETRIC PANEL  Returned

 

 2016 12:00 AM  GLUCOSE TOLERANCE TEST (GTT)  Returned

 

 2012 12:00 AM  X-RAY EXAM KNEE 4 OR MORE  Returned

 

 4/15/2010 12:00 AM  OBSTETRIC PANEL  Reviewed

 

 4/15/2010 12:00 AM  HIV-1ANTIBODY  Reviewed

 

 4/15/2010 12:00 AM  URINALYSIS NONAUTO W/SCOPE  Reviewed

 

 4/15/2010 12:00 AM  OB US < 14 WKS SINGLE FETUS  Reviewed

 

 2010 12:00 AM  GLUCOSE TEST  Reviewed

 

 2010 12:00 AM  Rhogam  Reviewed

 

 2010 12:00 AM  Type and screen  Reviewed

 

 2010 12:00 AM  COMPLETE CBC W/AUTO DIFF WBC  Reviewed

 

 2010 12:00 AM  COMPLETE CBC W/AUTO DIFF WBC  Reviewed

 

 10/14/2010 12:00 AM  CULTURE OTHR SPECIMN AEROBIC  Reviewed

 

 12/15/2010 12:00 AM  INSERTION IMPLANON, IMPLANTABLE CONTRACEPTIVE CAPSULES  
Reviewed

 

 12/15/2010 12:00 AM  Etonogestrel (contraceptive) implant system, (Implanon)  
Reviewed

 

 7/15/2011 12:00 AM  CYTOPATH TBS C/V MANUAL  Reviewed

 

 7/15/2011 12:00 AM  CHLAMYDIA CULTURE  Reviewed

 

 7/15/2011 12:00 AM  N.GONORRHOEAE DNA AMP PROB  Reviewed

 

 7/15/2011 12:00 AM  URINE PREGNANCY TEST  Reviewed

 

 2011 12:00 AM  IMMUNIZATION ADMIN  Reviewed

 

 2011 12:00 AM  HPV VACCINE 4 VALENT IM  Reviewed







Results Summary







 Data and Description  Results

 

 2010 10:41 AM  RUBELLA 34.0 IU/mLCOLOR YELLOW APPEARANCE HAZY SPEC GRAV 
1.025 pH 7.0 PROTEIN TRACE GLUCOSE NEGATIVE KETONE 15 BILIRUBIN NEGATIVE BLOOD 
NEGATIVE NITRITE NEGATIVE LEUK SCREEN NEGATIVE CASTS/LPF NEGATIVE CRYSTALS 
TRACE AMORPH MUCOUS THRDS FEW BACTERIA NEGATIVE EPITH CELLS FEW SQUAMOUS 
TRICHOMONAS NEGATIVE YEAST NEGATIVE WBC 6.3 RBC 4.57 HGB 13.90 g/dLHCT 40.30 %
MCV 88.0 fLMCH 30.40 pgMCHC 34.50 g/dLMPV 9.20 fLPLT 258 %NEUT 65.10 %%LYMP 
28.30 %#NEUT 4.10 #LYMP 1.80 

 

 2010 3:48 PM  WBC 12.9 RBC 4.02 HGB 12.60 g/dLHCT 37.40 %MCV 93.0 fLMCH 
31.30 pgMCHC 33.70 g/dLRDW CV 14.10 %MPV 9.50 fLPLT 263 %NEUT 69.20 %%LYMP 
22.30 %%MONO 7.60 %%EOS 0.70 %%BASO 0.20 %#NEUT 8.91 #LYMP 2.88 #MONO 0.98 #EOS 
0.09 #BASO 0.03 

 

 10/22/2010 8:21 PM  COLOR YELLOW APPEARANCE CLEAR SPEC GRAV 1.010 pH 6.5 
PROTEIN NEGATIVE GLUCOSE NEGATIVE KETONE 15 BILIRUBIN NEGATIVE BLOOD NEGATIVE 
NITRITE NEGATIVE LEUK SCREEN SMALL CASTS/LPF NEGATIVE CRYSTALS NEGATIVE MUCOUS 
THRDS NEGATIVE BACTERIA 2++ EPITH CELLS 3+++ SQUAMOUS TRICHOMONAS NEGATIVE 
YEAST NEGATIVE 

 

 2010 11:58 PM  COLOR YELLOW APPEARANCE CLEAR SPEC GRAV 1.015 pH 6.5 
PROTEIN NEGATIVE GLUCOSE NEGATIVE KETONE NEGATIVE BILIRUBIN NEGATIVE BLOOD 
NEGATIVE NITRITE NEGATIVE LEUK SCREEN LARGE CASTS/LPF NEGATIVE CRYSTALS 
NEGATIVE MUCOUS THRDS NEGATIVE BACTERIA 2++ EPITH CELLS 2++ SQUAMOUS 
TRICHOMONAS NEGATIVE YEAST NEGATIVE 

 

 11/15/2010 5:30 PM  .0 IU/LURIC ACID 3.7 mg/dLGLUCOSE 66.0 mg/dLSODIUM 
137.0 mmol/LPOTASSIUM 3.10 mmol/LCHLORIDE 105.0 mmol/LCO2 21.0 mmol/LBUN 3.0 mg/
dLCREATININE 0.50 mg/dLSGOT/AST 20.0 IU/LSGPT/ALT 18.0 IU/LALK PHOS 152.0 IU/
LTOTAL PROTEIN 6.20 g/dLALBUMIN 3.40 g/dLTOTAL BILI 0.60 mg/dLCALCIUM 9.10 mg/
dLeGFR >60 mL/min/1.73 m2WBC 16.9 RBC 4.18 HGB 13.30 g/dLHCT 39.70 %MCV 95.0 
fLMCH 31.80 pgMCHC 33.50 g/dLRDW CV 14.0 %MPV 10.10 fLPLT 268 %NEUT 75.70 %%
LYMP 17.50 %%MONO 6.20 %%EOS 0.40 %%BASO 0.20 %#NEUT 12.82 #LYMP 2.97 #MONO 
1.05 #EOS 0.06 #BASO 0.03 

 

 11/15/2010 8:50 PM  PROTEIN UR 14.0 mg/dL

 

 2016 12:21 PM  Pap Smear Collected 

 

 2016 2:00 PM  WBC 9.8 RBC 4.60 HGB 13.60 g/dLHCT 41.40 %MCV 90.0 fLMCH 
29.60 pgMCHC 32.90 g/dLRDW CV 13.80 %MPV 9.70 fLPLT 271 %NEUT 59.80 %%LYMP 33.0 
%%MONO 6.40 %%EOS 0.60 %%BASO 0.20 %#NEUT 5.83 #LYMP 3.22 #MONO 0.62 #EOS 0.06 #
BASO 0.02 HIV AG/AB COMBO 0.14 RPR Non Reactive Rubella Antibodies, IgG 2.88 
Index

 

 2016 2:08 PM  COLOR YELLOW APPEARANCE CLEAR SPEC GRAV <=1.005 pH 6.0 
PROTEIN NEGATIVE GLUCOSE NEGATIVE mg/dLKETONE NEGATIVE BILIRUBIN NEGATIVE BLOOD 
NEGATIVE NITRITE NEGATIVE LEUK SCREEN TRACE CASTS/LPF NEGATIVE /LPFCRYSTALS 
NEGATIVE MUCOUS THRDS NEGATIVE BACTERIA NEGATIVE EPITH CELLS 3+++ SQUAMOUS /
HPFTRICHOMONAS NEGATIVE YEAST NEGATIVE 







History Of Immunizations







 Name  Date Admin  Mfg Name  Mfg Code  Trade Name  Lot#  Route  Inj  Vis Given  
Vis Pub  CVX

 

 HPV  2011  [employer]  MSD  GARDASIL  [id number]  Intramuscular  Left 
Deltoid  2011  999

 

 HPV  2011  [employer]  MSD  GARDASIL  [id number]  Intramuscular  Left 
Deltoid  2011  62







History of Past Illness







 Name  Date of Onset  Comments

 

 Atrial Septal Defect, Ostium Secundum Type      

 

 Prenatal Care, High Risk Pregnancy  2010   

 

    Apr 15 2010  4:29PM   

 

 Prenatal Care, High Risk Pregnancy  Apr 15 2010  4:13PM   

 

    2010  9:54AM   

 

 Prenatal Care, High Risk Pregnancy  2010 10:10AM   

 

    May 13 2010  9:01AM   

 

    John 10 2010  2:45PM   

 

    2010  4:16PM   

 

 Pregnancy, First Normal  Aug 30 2010  9:14AM   

 

 Pregnancy, High Risk  Sep 13 2010  3:13PM   

 

 Group B Strep Screening,   Oct 14 2010  5:43PM   

 

 Pregnancy, High Risk  Oct 14 2010  5:43PM   

 

 Postoperative Examination Following Surgery  Dec  6 2010  9:15AM   

 

 IMPLANON  Insertion  Dec 15 2010 10:44AM   

 

 General Medical Exam, Child  2011  8:58AM   

 

 Contraceptive Management  2011  8:58AM   

 

 Routine gynecological examination  Jul 15 2011 10:03AM   

 

 Contraceptive Counseling  Jul 15 2011 10:03AM   

 

 Vaginal Discharge  Jul 15 2011 10:03AM   

 

 Irregular Menses  Jul 15 2011 10:03AM   

 

 Gardsil (HPV)  2011  8:54AM   

 

 Irregular Menses  Aug 31 2011  9:07AM   

 

 Family Planning  Aug 31 2011  9:07AM   

 

 Contraceptive Management  Andreas  3 2012  9:43AM   

 

 Local Infection  Adnreas  3 2012  9:43AM   

 

 Well Child Examination  2012  1:46PM   

 

 Right knee pain  2012  1:46PM   

 

 Right knee pain  2012  1:24PM   

 

 Abscess  2012  1:24PM   

 

 Right knee pain  Mar 26 2012 10:32AM   

 

 General Medical Exam, Adult  2012  1:46PM   

 

 Pregnancy test confirmed positive  2016 11:37AM   

 

 Obesity  2016 11:37AM   







Payers







 Insurance Name  Company Name  Plan Name  Plan Number  Policy Number  Policy 
Group Number  Start Date

 

    [place] [employer] [facility]     
[id number]     N/A

 

    zzzTest Medicare A  Test Medicare A     void     N/A







History of Encounters







 Visit Date  Visit Type  Provider

 

 2016  Office visit  Dr. Antonella Ventura MD

 

 2016  Office visit  Dr. Antonella Ventura MD

 

 2016  Office visit  Velma Mendieta APRN

 

 3/26/2012  Office visit  Ivanna Swan APRN

 

 2012  Office visit  Ivanna Swan APRN

 

 2012  Office visit  Ivanna Swan APRN

 

 1/3/2012  Office visit  Ivanna Swan APRN

 

 2011  Nurse visit  Ivanna Swan APRN

 

 2011  Nurse visit  Ivanna Swan APRN

 

 7/15/2011  Office visit  Ivanna Swan APRN

 

 2011  Office visit  Ivanna Swan APRN

 

 12/15/2010  Office visit  Willard Sorensen MD

 

 2010  Surgery  Willard Sorensen MD

 

 2010  Hospital  Willard Sorensen MD

 

 11/15/2010  Office visit  Willard Sorensen MD

 

 11/15/2010  Hospital  Willard Sorensen MD

 

 2010  Office visit  Willard Sorensen MD

 

 2010  Office visit  Willard Sorensen MD

 

 10/28/2010  Office visit  Willard Sorensen MD

 

 10/21/2010  Office visit  Willard Sorensen MD

 

 10/14/2010  Office visit  Willard Sorensen MD

 

 10/9/2010  [facility]  Willard Sorensen MD

 

 10/7/2010  Office visit  Willard Sorensen MD

 

 2010  Office visit  Willard Sorensen MD

 

 2010  Office visit  Willard Sorensen MD

 

 2010  Office visit  Willard Sorensen MD

 

 2010  Office visit  Willard Sorensen MD

 

 2010  Office visit  Willard Sorensen MD

 

 6/10/2010  Office visit  Willard Sorensen MD

 

 2010  Office visit  Willard Sorensen MD

 

 2010  Office visit  Willard Sorensen MD

 

 4/15/2010  Office visit  Willard Sorensen MD

## 2018-11-29 NOTE — XMS REPORT
Continuity of Care Document

 Created on: 10/01/2016



BLANCA AVILES

External Reference #: [id number]

: 1990

Sex: Female



Demographics







 Address  [address]

[place]  [address]

 

 Home Phone  [phone]

 

 Preferred Language  Unknown

 

 Marital Status  

 

 Latter day Affiliation  Unknown

 

 Race  White

 

 Ethnic Group  Unknown





Author







 Author  [facility]

 

 Organization  [facility]

 

 Address  [facility]

 [address] [place] [address]  [address]



 

 Phone  Unavailable







Support







 Name  Relationship  Address  Phone

 

 MURTAZA NASCIMENTO MD  Caregiver  [address] [place] [address]  [address]  Unavailable

 

 DOE AVILES  Next Of Kin  [address]

[place], [place]  [address] [phone]







Insurance Providers







 Payer Name  Policy Number  Subscriber Name  Relationship

 

 [facility]  [id number]  Blanca Aviles  18 Self / Same As 
Patient







Advance Directives







 Directive  Response  Recorded Date/Time

 

 Advance Directives  No  02/20/15 7:18am

 

 Living Will  No  02/20/15 7:18am

 

 Health Care Proxy  No  16 7:39pm

 

 Power of  for Health Care  No  02/20/15 7:18am

 

 Who is designated as your agent/rep. to act on your behalf?  PT  02/22/15 10:
14am

 

 Organ, Tissue, or Eye Donor  No  02/20/15 7:17am

 

 Do you have a signed organ donor card?  No  02/20/15 7:17am







Chief Complaint and Reason for Visit







 Chief Complaint  EARACHE

 

 Reason for Visit  [id number]







Problems

Active Problems







 Medical Problem  Onset Date  Status

 

 Cellulitis of tragus of left ear  Unknown  Acute

 

 Nausea and vomiting during pregnancy  Unknown  Acute

 

 Pregnancy  Unknown  Acute

 

 Renal stone  Unknown  Acute

 

 Spontaneous   Unknown  Acute

 

 Spontaneous   Unknown  Acute

 

 Spontaneous   Unknown  Acute

 

 UTI (lower urinary tract infection)  Unknown  Acute







Medications

Current Home Medications







 Medication  Dose  Units  Route  Directions  Days/Qty  Instructions  Start Date

 

 Pnv With Ca,No.74/Iron/Fa 1 Each  1  Each  Oral           02/20/15

 

 Sulfamethoxazole/Trimethoprim* 1 Tab  1  Ea  Oral  Twice A Day  14  TAKE FOR 5 
DAYS  16





Past Home Medications







 Medication  Directions  Ordered  Status

 

 Folic Acid 1 Mg Tablet, 1 Mg Oral  Daily  02/20/15  Discontinued

 

 Cephalexin Monohydrate 500 Mg Capsule, 1000 Mg Oral  Twice A Day  02/20/15  
Discontinued

 

 Acetaminophen/ Codeine #3 Tab* 1 Tab Tablet, 1 Ea Oral  Every 6 Hrs As Needed 
For Pain for Pain  02/22/15  Discontinued

 

 Acetaminophen/Hydrocodone Bitart (Lortab 5-325*) 1 Tab Tablet, 1 Each Oral  
Every 4-6 Hrs As Needed Pain as needed for Pain  07/04/15  Discontinued

 

 Tamsulosin Hcl 0.4 Mg Cap.sr.24h, 0.4 Mg Oral  Daily  07/04/15  Discontinued

 

 Ondansetron* 4 Mg/Tab Tab.rapdis, 4 Mg Oral  Every 4-6 Hours As Needed as 
needed for Nausea  07/04/15  Discontinued







Social History







 Social History Problem  Response  Recorded Date/Time

 

 Smoking Status  Never smoker  2015 8:06am

 

 Sexual History  Heterosexual  2016 8:30pm









 Query  Response  Start Date  Stop Date

 

 Smoking Status  Never smoker      







Hospital Discharge Instructions

No hospital discharge instructions.



Plan of Care







 Discharge Date  16 8:20pm

 

 Condition at Discharge  Stable

 

 Instructions/Education Provided  Cellulitis (ED)

 

 Prescriptions  See Medication Section







Functional Status







 Query  Response  Date Recorded

 

 Patient Behavior  Appropriate

  2016 8:00pm







Allergies, Adverse Reactions, Alerts

No known allergies.



Immunizations







 Name  Given  Type

 

 Hx Diphtheria, Pertussis, Tetanus Vaccination  Unknown  Historical

 

 Hx Influenza Vaccination  No  Historical

 

 Hx Pneumococcal Vaccination  No  Historical







Vital Signs

Acute Vital Signs





 Vital  Response  Date/Time

 

 Temperature (Fahrenheit)  97.9 degrees F (97.6 - 99.5)  2016 8:00pm

 

 Temperature Source  Temporal Artery  2016 8:00pm

 

 Pulse Rate (adult)  98 bpm (60 - 90)  2016 8:00pm

 

 Respiratory Rate  16 bpm (12 - 24)  2016 8:00pm

 

 Blood Pressure  118/76 mm Hg  2016 8:00pm

 

 O2 Sat by Pulse Oximetry  97 % (90 - 100)  2016 8:00pm

 

 Oxygen Delivery Method     2016 8:00pm

 

 Height  5 ft 4 in   

 

 Weight  167 lb   

 

 Body Mass Index  28.0 kg/m^2   







Results

No known relevant diagnostic tests, laboratory data and/or discharge summary.



Procedures

No known history of procedures.



Encounters







 Encounter  Location  Arrival/Admit Date  Discharge/Depart Date  Attending 
Provider

 

 Departed Emergency Room  Onset  16 7:42pm  16 8:20pm  MURTAZA NASCIMENTO MD











 Recent Diagnosis

## 2018-11-29 NOTE — XMS REPORT
MU2 Ambulatory Summary

 Created on: 2016



Radha Aviles

External Reference #: [id number]

: 1990

Sex: Female



Demographics







 Address  [address]

[place]  [address]

 

 Home Phone  [phone]

 

 Preferred Language  English

 

 Marital Status  Never 

 

 [employer] Affiliation  Unknown

 

 Race  White

 

 Ethnic Group  Not  or 





Author







 Author  Velma Mendieta

 

 [facility]

 

 Address  [address]

[place], [place]  [id number]



 

 Phone  [phone]







Care Team Providers







 Care Team Member Name  Role  Phone

 

 Velma Mendieta  PCP  Unavailable







Allergies and Adverse Reactions







 Name  Reaction  Notes

 

 NO KNOWN DRUG ALLERGIES      







Plan of Treatment







 Planned Activity  Comments  Planned Date  Planned Time  Plan/Goal

 

 CYTOPATH C/V THIN LAYER     2016  12:00 AM   

 

 N.GONORRHOEAE DNA AMP PROB     2016  12:00 AM   

 

 CHLAMYDIA CULTURE     2016  12:00 AM   

 

 HIV-1ANTIBODY     2016  12:00 AM   

 

 URINALYSIS AUTO W/SCOPE     2016  12:00 AM   

 

 OBSTETRIC PANEL     2016  12:00 AM   

 

 GLUCOSE TOLERANCE TEST (GTT)     2016  12:00 AM   







Medications







  

 

 Name  Start Date  Expiration Date  SIG  Comments

 

 [place] 60 mg iron-1 mg oral tablet  4/15/2010  3/11/2011  take 1 tablet by 
oral route once daily for 30 days   

 

 Diflucan 150 mg oral tablet  2010  take 1 tablet (150 mg) by 
oral route once  for 1 days   

 

 Premarin 1.25 mg oral tablet  2011  take 1 tablet by oral route 
daily for 10 days   

 

 doxycycline hyclate 100 mg oral tablet  7/15/2011  2011  take 1 tablet (
100 mg) by oral route every 12 hours for 10 days   

 

 ibuprofen 800 mg oral tablet  2012  take 1 tablet by oral route 
3 times a day for 30 days   

 

 Bactrim -160 mg oral tablet  2012  take 1 tablet by oral 
route every 12 hours for 7 days   









 Discontinued 

 

 Name  Start Date  Discontinued Date  SIG  Comments

 

 atenolol 25 mg oral tablet     2011  take 1/2 tablet by oral route QD   

 

 Medrol (Antwan) 4 mg oral tablets,dose pack  2012  3/26/2012  take as 
directed   







Problem List







 Description  Status  Onset

 

 Prenatal Care, High Risk Pregnancy  Active  2010







Vital Signs







 Date  Time  BP-Sys(mm[Hg]  BP-Marisa(mm[Hg])  HR(bpm)  RR(rpm)  Temp  WT  HT  HC  
BMI  BSA  BMI Percentile  O2 Sat(%)

 

 2016  11:15:00 AM  130 mmHg  70 mmHg  80 bpm  20 rpm     214 lbs  64 in  
   36.73 kg/m2  2.09 m2      

 

 3/26/2012  10:30:00 AM  126 mmHg  62 mmHg  106 bpm  18 rpm  96.8 F  205 lbs  
64 in     35.1878 kg/m  2.0491 m     97 %

 

 2012  1:22:00 PM  120 mmHg  68 mmHg  66 bpm  18 rpm  97 F  202.375 lbs  
64 in     34.74 kg/m2  2.04 m2      

 

 2012  1:44:00 PM  122 mmHg  64 mmHg  68 bpm  18 rpm  98.2 F  198.125 lbs  
64 in     34.0077 kg/m  2.0145 m      

 

 1/3/2012  9:40:00 AM  103 mmHg  77 mmHg  64 bpm  18 rpm  97.7 F  197.125 lbs  
64 in     33.84 kg/m2  2.01 m2      

 

 2011  9:06:00 AM  118 mmHg  68 mmHg  74 bpm     97.4 F  189 lbs          
        

 

 7/15/2011  10:06:00 AM  126 mmHg  70 mmHg  73 bpm  20 rpm  97.9 F  180.5 lbs  
64 in     30.98 kg/m2  1.92 m2  0 %  99 %

 

 2011  8:56:00 AM  96 mmHg  68 mmHg  66 bpm  20 rpm  97.5 F  177.5 lbs  64 
in     30.4675 kg/m  1.9067 m  0 %  98 %

 

 12/15/2010  10:43:00 AM  104 mmHg  64 mmHg  48 bpm     97.8 F  150 lbs        
          

 

 2010  9:15:00 AM  112 mmHg  72 mmHg  49 bpm     98 F  149 lbs  64 in     
25.5755 kg/m  1.747 m  81.5 %   

 

 2010  8:54:00 AM  117 mmHg  65 mmHg  73 bpm  20 rpm  97.8 F  160.5 lbs   
               

 

 2010  9:42:00 AM  134 mmHg  66 mmHg  66 bpm        161 lbs               
   

 

 4/15/2010  4:02:00 PM  135 mmHg  71 mmHg  77 bpm        162 lbs  64 in     
27.81 kg/m2  1.82 m2  89.9 %   







Social History







 Name  Description  Comments

 

 History of tobacco usage     Quit smoking in January - was smoking 1-2cig/day

 

 denies alcohol use      

 

 Tobacco  Current every day smoker   







History of Procedures







 Date Ordered  Description  Order Status

 

 2011 12:00 AM  URINE PREGNANCY TEST  Reviewed

 

 2011 12:00 AM  IMMUNIZATION ADMIN  Reviewed

 

 2011 12:00 AM  HPV VACCINE 4 VALENT IM  Reviewed

 

 2012 12:00 AM  X-RAY EXAM KNEE 4 OR MORE  Returned

 

 4/15/2010 12:00 AM  OBSTETRIC PANEL  Reviewed

 

 4/15/2010 12:00 AM  HIV-1ANTIBODY  Reviewed

 

 4/15/2010 12:00 AM  URINALYSIS NONAUTO W/SCOPE  Reviewed

 

 4/15/2010 12:00 AM  OB US < 14 WKS SINGLE FETUS  Reviewed

 

 2010 12:00 AM  GLUCOSE TEST  Reviewed

 

 2010 12:00 AM  Rhogam  Reviewed

 

 2010 12:00 AM  Type and screen  Reviewed

 

 2010 12:00 AM  COMPLETE CBC W/AUTO DIFF WBC  Reviewed

 

 2010 12:00 AM  COMPLETE CBC W/AUTO DIFF WBC  Reviewed

 

 10/14/2010 12:00 AM  CULTURE OTHR SPECIMN AEROBIC  Reviewed

 

 12/15/2010 12:00 AM  INSERTION IMPLANON, IMPLANTABLE CONTRACEPTIVE CAPSULES  
Reviewed

 

 12/15/2010 12:00 AM  Etonogestrel (contraceptive) implant system, (Implanon)  
Reviewed

 

 7/15/2011 12:00 AM  CYTOPATH TBS C/V MANUAL  Reviewed

 

 7/15/2011 12:00 AM  CHLAMYDIA CULTURE  Reviewed

 

 7/15/2011 12:00 AM  N.GONORRHOEAE DNA AMP PROB  Reviewed

 

 7/15/2011 12:00 AM  URINE PREGNANCY TEST  Reviewed

 

 2011 12:00 AM  IMMUNIZATION ADMIN  Reviewed

 

 2011 12:00 AM  HPV VACCINE 4 VALENT IM  Reviewed







Results Summary







 Data and Description  Results

 

 2010 10:41 AM  RUBELLA 34.0 IU/mLCOLOR YELLOW APPEARANCE HAZY SPEC GRAV 
1.025 pH 7.0 PROTEIN TRACE GLUCOSE NEGATIVE KETONE 15 BILIRUBIN NEGATIVE BLOOD 
NEGATIVE NITRITE NEGATIVE LEUK SCREEN NEGATIVE CASTS/LPF NEGATIVE CRYSTALS 
TRACE AMORPH MUCOUS THRDS FEW BACTERIA NEGATIVE EPITH CELLS FEW SQUAMOUS 
TRICHOMONAS NEGATIVE YEAST NEGATIVE WBC 6.3 RBC 4.57 HGB 13.90 g/dLHCT 40.30 %
MCV 88.0 fLMCH 30.40 pgMCHC 34.50 g/dLMPV 9.20 fLPLT 258 %NEUT 65.10 %%LYMP 
28.30 %#NEUT 4.10 #LYMP 1.80 

 

 2010 3:48 PM  WBC 12.9 RBC 4.02 HGB 12.60 g/dLHCT 37.40 %MCV 93.0 fLMCH 
31.30 pgMCHC 33.70 g/dLRDW CV 14.10 %MPV 9.50 fLPLT 263 %NEUT 69.20 %%LYMP 
22.30 %%MONO 7.60 %%EOS 0.70 %%BASO 0.20 %#NEUT 8.91 #LYMP 2.88 #MONO 0.98 #EOS 
0.09 #BASO 0.03 

 

 10/22/2010 8:21 PM  COLOR YELLOW APPEARANCE CLEAR SPEC GRAV 1.010 pH 6.5 
PROTEIN NEGATIVE GLUCOSE NEGATIVE KETONE 15 BILIRUBIN NEGATIVE BLOOD NEGATIVE 
NITRITE NEGATIVE LEUK SCREEN SMALL CASTS/LPF NEGATIVE CRYSTALS NEGATIVE MUCOUS 
THRDS NEGATIVE BACTERIA 2++ EPITH CELLS 3+++ SQUAMOUS TRICHOMONAS NEGATIVE 
YEAST NEGATIVE 

 

 2010 11:58 PM  COLOR YELLOW APPEARANCE CLEAR SPEC GRAV 1.015 pH 6.5 
PROTEIN NEGATIVE GLUCOSE NEGATIVE KETONE NEGATIVE BILIRUBIN NEGATIVE BLOOD 
NEGATIVE NITRITE NEGATIVE LEUK SCREEN LARGE CASTS/LPF NEGATIVE CRYSTALS 
NEGATIVE MUCOUS THRDS NEGATIVE BACTERIA 2++ EPITH CELLS 2++ SQUAMOUS 
TRICHOMONAS NEGATIVE YEAST NEGATIVE 

 

 11/15/2010 5:30 PM  .0 IU/LURIC ACID 3.7 mg/dLGLUCOSE 66.0 mg/dLSODIUM 
137.0 mmol/LPOTASSIUM 3.10 mmol/LCHLORIDE 105.0 mmol/LCO2 21.0 mmol/LBUN 3.0 mg/
dLCREATININE 0.50 mg/dLSGOT/AST 20.0 IU/LSGPT/ALT 18.0 IU/LALK PHOS 152.0 IU/
LTOTAL PROTEIN 6.20 g/dLALBUMIN 3.40 g/dLTOTAL BILI 0.60 mg/dLCALCIUM 9.10 mg/
dLeGFR >60 mL/min/1.73 m2WBC 16.9 RBC 4.18 HGB 13.30 g/dLHCT 39.70 %MCV 95.0 
fLMCH 31.80 pgMCHC 33.50 g/dLRDW CV 14.0 %MPV 10.10 fLPLT 268 %NEUT 75.70 %%
LYMP 17.50 %%MONO 6.20 %%EOS 0.40 %%BASO 0.20 %#NEUT 12.82 #LYMP 2.97 #MONO 
1.05 #EOS 0.06 #BASO 0.03 

 

 11/15/2010 8:50 PM  PROTEIN UR 14.0 mg/dL

 

 2016 12:21 PM  Pap Smear Collected 







History Of Immunizations







 Name  Date Admin  Mfg Name  Mfg Code  Trade Name  Lot#  Route  Inj  Vis Given  
Vis Pub  CVX

 

 HPV  2011  [employer]  MSD  GARDASIL  [id number]  Intramuscular  Left 
Deltoid  2011  999

 

 HPV  2011  [employer]  MSD  GARDASIL  [id number]  Intramuscular  Left 
Deltoid  2011  62







History of Past Illness







 Name  Date of Onset  Comments

 

 Atrial Septal Defect, Ostium Secundum Type      

 

 Prenatal Care, High Risk Pregnancy  2010   

 

    Apr 15 2010  4:29PM   

 

 Prenatal Care, High Risk Pregnancy  Apr 15 2010  4:13PM   

 

    2010  9:54AM   

 

 Prenatal Care, High Risk Pregnancy  2010 10:10AM   

 

    May 13 2010  9:01AM   

 

    John 10 2010  2:45PM   

 

    2010  4:16PM   

 

 Pregnancy, First Normal  Aug 30 2010  9:14AM   

 

 Pregnancy, High Risk  Sep 13 2010  3:13PM   

 

 Group B Strep Screening,   Oct 14 2010  5:43PM   

 

 Pregnancy, High Risk  Oct 14 2010  5:43PM   

 

 Postoperative Examination Following Surgery  Dec  6 2010  9:15AM   

 

 IMPLANON  Insertion  Dec 15 2010 10:44AM   

 

 General Medical Exam, Child  2011  8:58AM   

 

 Contraceptive Management  2011  8:58AM   

 

 Routine gynecological examination  Jul 15 2011 10:03AM   

 

 Contraceptive Counseling  Jul 15 2011 10:03AM   

 

 Vaginal Discharge  Jul 15 2011 10:03AM   

 

 Irregular Menses  Jul 15 2011 10:03AM   

 

 G[place]il (HPV)  2011  8:54AM   

 

 Irregular Menses  Aug 31 2011  9:07AM   

 

 Family Planning  Aug 31 2011  9:07AM   

 

 Contraceptive Management  Andreas  3 2012  9:43AM   

 

 Local Infection  Andreas  3 2012  9:43AM   

 

 Well Child Examination  2012  1:46PM   

 

 Right knee pain  2012  1:46PM   

 

 Right knee pain  2012  1:24PM   

 

 Abscess  2012  1:24PM   

 

 Right knee pain  Mar 26 2012 10:32AM   

 

 General Medical Exam, Adult  2012  1:46PM   

 

 Pregnancy test confirmed positive  2016 11:37AM   

 

 Obesity  2016 11:37AM   







Payers







 Insurance Name  Company Name  Plan Name  Plan Number  Policy Number  Policy 
Group Number  Start Date

 

    [employer] [facility]     
[id number]     N/A

 

    [facility]     N/A







History of Encounters







 Visit Date  Visit Type  Provider

 

 2016  Office visit  Velma Mendieta APRN

 

 3/26/2012  Office visit  Ivanna Swan APRN

 

 2012  Office visit  Ivanna Swan APRN

 

 2012  Office visit  Ivanna Swan APRN

 

 1/3/2012  Office visit  Ivanna Swan APRN

 

 2011  Nurse visit  Ivanna Swan APRN

 

 2011  Nurse visit  Ivanna Swan APRN

 

 7/15/2011  Office visit  Ivanna Swan APRN

 

 2011  Office visit  Ivanna Swan APRN

 

 12/15/2010  Office visit  Willard Sorensen MD

 

 2010  Surgery  Willard Sorensen MD

 

 2010  [facility]  Willard Sorensen MD

 

 11/15/2010  Office visit  Willard Sorensen MD

 

 11/15/2010  [facility]  Willard Sorensen MD

 

 2010  Office visit  Willard Sorensen MD

 

 2010  Office visit  Willard Sorensen MD

 

 10/28/2010  Office visit  Willard Sorensen MD

 

 10/21/2010  Office visit  Willard Sorensen MD

 

 10/14/2010  Office visit  Willard Sorensen MD

 

 10/9/2010  [facility]  Willard Sorensen MD

 

 10/7/2010  Office visit  Willard Sorensen MD

 

 2010  Office visit  Willard Sorensen MD

 

 2010  Office visit  Willard Sorensen MD

 

 2010  Office visit  Willard Sorensen MD

 

 2010  Office visit  Willard Sorensen MD

 

 2010  Office visit  Willard Sorensen MD

 

 6/10/2010  Office visit  Willard Sorensen MD

 

 2010  Office visit  Willard Sorensen MD

 

 2010  Office visit  Willard Sorensen MD

 

 4/15/2010  Office visit  Willard Sorensen MD

## 2018-11-29 NOTE — XMS REPORT
MU2 Ambulatory Summary

 Created on: 2016



Radha Aviles

External Reference #: [id number]

: 1990

Sex: Female



Demographics







 Address  [address]

[place]  [address]

 

 Home Phone  [phone]

 

 Preferred Language  English

 

 Marital Status  Never 

 

 Orthodoxy Affiliation  Unknown

 

 Race  White

 

 Ethnic Group  Not  or 





Author







 Author  Antonella Ventura

 

 [facility]

 

 Address  [address]

[place], [place]  [id number]



 

 Phone  [phone]







Care Team Providers







 Care Team Member Name  Role  Phone

 

 Antonella Ventura  PCP  Unavailable







Allergies and Adverse Reactions







 Name  Reaction  Notes

 

 NO KNOWN DRUG ALLERGIES      







Plan of Treatment

Not available.



Medications







  

 

 Name  Start Date  Expiration Date  SIG  Comments

 

 NataFort 60 mg iron-1 mg oral tablet  4/15/2010  3/11/2011  take 1 tablet by 
oral route once daily for 30 days   

 

 Diflucan 150 mg oral tablet  2010  take 1 tablet (150 mg) by 
oral route once  for 1 days   

 

 Premarin 1.25 mg oral tablet  2011  take 1 tablet by oral route 
daily for 10 days   

 

 doxycycline hyclate 100 mg oral tablet  7/15/2011  2011  take 1 tablet (
100 mg) by oral route every 12 hours for 10 days   

 

 ibuprofen 800 mg oral tablet  2012  take 1 tablet by oral route 
3 times a day for 30 days   

 

 Bactrim -160 mg oral tablet  2012  take 1 tablet by oral 
route every 12 hours for 7 days   

 

 Macrobid 100 mg oral capsule  2016  take 1 capsule (100 mg) by 
oral route 2 times per day with food for 7 days   









 Discontinued 

 

 Name  Start Date  Discontinued Date  SIG  Comments

 

 atenolol 25 mg oral tablet     2011  take 1/2 tablet by oral route QD   

 

 Medrol (Antwan) 4 mg oral tablets,dose pack  2012  3/26/2012  take as 
directed   







Problem List







 Description  Status  Onset

 

 Prenatal Care, High Risk Pregnancy  Active  2010







Vital Signs







 Date  Time  BP-Sys(mm[Hg]  BP-Marisa(mm[Hg])  HR(bpm)  RR(rpm)  Temp  WT  HT  HC  
BMI  BSA  BMI Percentile  O2 Sat(%)

 

 2016  11:15:00 AM  130 mmHg  70 mmHg  80 bpm  20 rpm     214 lbs  64 in  
   36.73 kg/m2  2.09 m2      

 

 3/26/2012  10:30:00 AM  126 mmHg  62 mmHg  106 bpm  18 rpm  96.8 F  205 lbs  
64 in     35.1878 kg/m  2.0491 m     97 %

 

 2012  1:22:00 PM  120 mmHg  68 mmHg  66 bpm  18 rpm  97 F  202.375 lbs  
64 in     34.74 kg/m2  2.04 m2      

 

 2012  1:44:00 PM  122 mmHg  64 mmHg  68 bpm  18 rpm  98.2 F  198.125 lbs  
64 in     34.0077 kg/m  2.0145 m      

 

 1/3/2012  9:40:00 AM  103 mmHg  77 mmHg  64 bpm  18 rpm  97.7 F  197.125 lbs  
64 in     33.84 kg/m2  2.01 m2      

 

 2011  9:06:00 AM  118 mmHg  68 mmHg  74 bpm     97.4 F  189 lbs          
        

 

 7/15/2011  10:06:00 AM  126 mmHg  70 mmHg  73 bpm  20 rpm  97.9 F  180.5 lbs  
64 in     30.98 kg/m2  1.92 m2  0 %  99 %

 

 2011  8:56:00 AM  96 mmHg  68 mmHg  66 bpm  20 rpm  97.5 F  177.5 lbs  64 
in     30.4675 kg/m  1.9067 m  0 %  98 %

 

 12/15/2010  10:43:00 AM  104 mmHg  64 mmHg  48 bpm     97.8 F  150 lbs        
          

 

 2010  9:15:00 AM  112 mmHg  72 mmHg  49 bpm     98 F  149 lbs  64 in     
25.5755 kg/m  1.747 m  81.5 %   

 

 2010  8:54:00 AM  117 mmHg  65 mmHg  73 bpm  20 rpm  97.8 F  160.5 lbs   
               

 

 2010  9:42:00 AM  134 mmHg  66 mmHg  66 bpm        161 lbs               
   

 

 4/15/2010  4:02:00 PM  135 mmHg  71 mmHg  77 bpm        162 lbs  64 in     
27.81 kg/m2  1.82 m2  89.9 %   







Social History







 Name  Description  Comments

 

 History of tobacco usage     Quit smoking in January - was smoking 1-2cig/day

 

 denies alcohol use      

 

 Tobacco  Former smoker   







History of Procedures







 Date Ordered  Description  Order Status

 

 2011 12:00 AM  URINE PREGNANCY TEST  Reviewed

 

 2011 12:00 AM  IMMUNIZATION ADMIN  Reviewed

 

 2011 12:00 AM  HPV VACCINE 4 VALENT IM  Reviewed

 

 2016 12:00 AM  CYTOPATH C/V THIN LAYER  Returned

 

 2016 12:00 AM  SPECIMEN HANDLING OFFICE-LAB  Reviewed

 

 2016 12:00 AM  N.GONORRHOEAE DNA AMP PROB  Returned

 

 2016 12:00 AM  CHLAMYDIA CULTURE  Returned

 

 2016 12:00 AM  HIV-1ANTIBODY  Returned

 

 2016 12:00 AM  URINALYSIS AUTO W/SCOPE  Returned

 

 2016 12:00 AM  OBSTETRIC PANEL  Returned

 

 2016 12:00 AM  GLUCOSE TOLERANCE TEST (GTT)  Returned

 

 3/28/2016 12:00 AM  ALPHA-FETOPROTEIN SERUM  Returned

 

 3/28/2016 12:00 AM  CHORIONIC GONADOTROPIN TEST  Returned

 

 3/28/2016 12:00 AM  CHORIONIC GONADOTROPIN ASSAY  Returned

 

 5/3/2016 12:00 AM  OB US >/=14 WKS SNGL FETUS  Returned

 

 2016 12:00 AM  RH IG FULL-DOSE IM  Returned

 

 2016 12:00 AM  Type and screen  Returned

 

 2016 12:00 AM  GLUCOSE TOLERANCE TEST (GTT)  Returned

 

 2016 12:00 AM  COMPLETE CBC W/AUTO DIFF WBC  Returned

 

 2012 12:00 AM  X-RAY EXAM KNEE 4 OR MORE  Returned

 

 2016 12:00 AM  TDAP VACCINE 7 YRS/> IM  Reviewed

 

 2016 12:00 AM  IMMUNIZATION ADMIN  Reviewed

 

 4/15/2010 12:00 AM  OBSTETRIC PANEL  Reviewed

 

 4/15/2010 12:00 AM  HIV-1ANTIBODY  Reviewed

 

 4/15/2010 12:00 AM  URINALYSIS NONAUTO W/SCOPE  Reviewed

 

 4/15/2010 12:00 AM  OB US < 14 WKS SINGLE FETUS  Reviewed

 

 2010 12:00 AM  GLUCOSE TEST  Reviewed

 

 2010 12:00 AM  Rhogam  Reviewed

 

 2010 12:00 AM  Type and screen  Reviewed

 

 2010 12:00 AM  COMPLETE CBC W/AUTO DIFF WBC  Reviewed

 

 2010 12:00 AM  COMPLETE CBC W/AUTO DIFF WBC  Reviewed

 

 10/14/2010 12:00 AM  CULTURE OTHR SPECIMN AEROBIC  Reviewed

 

 12/15/2010 12:00 AM  INSERTION IMPLANON, IMPLANTABLE CONTRACEPTIVE CAPSULES  
Reviewed

 

 12/15/2010 12:00 AM  Etonogestrel (contraceptive) implant system, (Implanon)  
Reviewed

 

 7/15/2011 12:00 AM  CYTOPATH TBS C/V MANUAL  Reviewed

 

 7/15/2011 12:00 AM  CHLAMYDIA CULTURE  Reviewed

 

 7/15/2011 12:00 AM  N.GONORRHOEAE DNA AMP PROB  Reviewed

 

 7/15/2011 12:00 AM  URINE PREGNANCY TEST  Reviewed

 

 2011 12:00 AM  IMMUNIZATION ADMIN  Reviewed

 

 2011 12:00 AM  HPV VACCINE 4 VALENT IM  Reviewed







Results Summary







 Data and Description  Results

 

 2010 10:41 AM  RUBELLA 34.0 IU/mLCOLOR YELLOW APPEARANCE HAZY SPEC GRAV 
1.025 pH 7.0 PROTEIN TRACE GLUCOSE NEGATIVE KETONE 15 BILIRUBIN NEGATIVE BLOOD 
NEGATIVE NITRITE NEGATIVE LEUK SCREEN NEGATIVE CASTS/LPF NEGATIVE CRYSTALS 
TRACE AMORPH MUCOUS THRDS FEW BACTERIA NEGATIVE EPITH CELLS FEW SQUAMOUS 
TRICHOMONAS NEGATIVE YEAST NEGATIVE WBC 6.3 RBC 4.57 HGB 13.90 g/dLHCT 40.30 %
MCV 88.0 fLMCH 30.40 pgMCHC 34.50 g/dLMPV 9.20 fLPLT 258 %NEUT 65.10 %%LYMP 
28.30 %#NEUT 4.10 #LYMP 1.80 

 

 2010 3:48 PM  WBC 12.9 RBC 4.02 HGB 12.60 g/dLHCT 37.40 %MCV 93.0 fLMCH 
31.30 pgMCHC 33.70 g/dLRDW CV 14.10 %MPV 9.50 fLPLT 263 %NEUT 69.20 %%LYMP 
22.30 %%MONO 7.60 %%EOS 0.70 %%BASO 0.20 %#NEUT 8.91 #LYMP 2.88 #MONO 0.98 #EOS 
0.09 #BASO 0.03 

 

 10/22/2010 8:21 PM  COLOR YELLOW APPEARANCE CLEAR SPEC GRAV 1.010 pH 6.5 
PROTEIN NEGATIVE GLUCOSE NEGATIVE KETONE 15 BILIRUBIN NEGATIVE BLOOD NEGATIVE 
NITRITE NEGATIVE LEUK SCREEN SMALL CASTS/LPF NEGATIVE CRYSTALS NEGATIVE MUCOUS 
THRDS NEGATIVE BACTERIA 2++ EPITH CELLS 3+++ SQUAMOUS TRICHOMONAS NEGATIVE 
YEAST NEGATIVE 

 

 2010 11:58 PM  COLOR YELLOW APPEARANCE CLEAR SPEC GRAV 1.015 pH 6.5 
PROTEIN NEGATIVE GLUCOSE NEGATIVE KETONE NEGATIVE BILIRUBIN NEGATIVE BLOOD 
NEGATIVE NITRITE NEGATIVE LEUK SCREEN LARGE CASTS/LPF NEGATIVE CRYSTALS 
NEGATIVE MUCOUS THRDS NEGATIVE BACTERIA 2++ EPITH CELLS 2++ SQUAMOUS 
TRICHOMONAS NEGATIVE YEAST NEGATIVE 

 

 11/15/2010 5:30 PM  .0 IU/LURIC ACID 3.7 mg/dLGLUCOSE 66.0 mg/dLSODIUM 
137.0 mmol/LPOTASSIUM 3.10 mmol/LCHLORIDE 105.0 mmol/LCO2 21.0 mmol/LBUN 3.0 mg/
dLCREATININE 0.50 mg/dLSGOT/AST 20.0 IU/LSGPT/ALT 18.0 IU/LALK PHOS 152.0 IU/
LTOTAL PROTEIN 6.20 g/dLALBUMIN 3.40 g/dLTOTAL BILI 0.60 mg/dLCALCIUM 9.10 mg/
dLeGFR >60 mL/min/1.73 m2WBC 16.9 RBC 4.18 HGB 13.30 g/dLHCT 39.70 %MCV 95.0 
fLMCH 31.80 pgMCHC 33.50 g/dLRDW CV 14.0 %MPV 10.10 fLPLT 268 %NEUT 75.70 %%
LYMP 17.50 %%MONO 6.20 %%EOS 0.40 %%BASO 0.20 %#NEUT 12.82 #LYMP 2.97 #MONO 
1.05 #EOS 0.06 #BASO 0.03 

 

 11/15/2010 8:50 PM  PROTEIN UR 14.0 mg/dL

 

 2016 12:21 PM  Pap Smear Collected 

 

 2016 2:00 PM  WBC 9.8 RBC 4.60 HGB 13.60 g/dLHCT 41.40 %MCV 90.0 fLMCH 
29.60 pgMCHC 32.90 g/dLRDW CV 13.80 %MPV 9.70 fLPLT 271 %NEUT 59.80 %%LYMP 33.0 
%%MONO 6.40 %%EOS 0.60 %%BASO 0.20 %#NEUT 5.83 #LYMP 3.22 #MONO 0.62 #EOS 0.06 #
BASO 0.02 HIV AG/AB COMBO 0.14 RPR Non Reactive Rubella Antibodies, IgG 2.88 
Index

 

 2016 2:08 PM  COLOR YELLOW APPEARANCE CLEAR SPEC GRAV <=1.005 pH 6.0 
PROTEIN NEGATIVE GLUCOSE NEGATIVE mg/dLKETONE NEGATIVE BILIRUBIN NEGATIVE BLOOD 
NEGATIVE NITRITE NEGATIVE LEUK SCREEN TRACE CASTS/LPF NEGATIVE /LPFCRYSTALS 
NEGATIVE MUCOUS THRDS NEGATIVE BACTERIA NEGATIVE EPITH CELLS 3+++ SQUAMOUS /
HPFTRICHOMONAS NEGATIVE YEAST NEGATIVE 

 

 3/28/2016 2:18 PM  AFP Value 0.0240 ug/mLAFP MoM 1.02 hCG Value 75140.0 mIU/
mLhCG MoM 1.77 uE3 Value 0.70 ng/mLuE3 MoM 1.16 MARISA Value 207.520 pg/mLDIA MoM 
1.25 OSBR Risk       1 [place] [address] DSR (Second Trimester) 1IN 4435 DSR (By Age)    
1 IN 1003 T18 Risk Not increased T18 (By Age) 1:3907 

 

 2016 3:15 PM  WBC 11.5 RBC 3.75 HGB 11.70 g/dLHCT 35.50 %MCV 95.0 fLMCH 
31.20 pgMCHC 33.0 g/dLRDW CV 14.10 %MPV 9.70 fLPLT 273 %NEUT 73.0 %%LYMP 20.10 %
%MONO 5.70 %%EOS 0.80 %%BASO 0.10 %#NEUT 8.40 #LYMP 2.31 #MONO 0.65 #EOS 0.09 #
BASO 0.01 







History Of Immunizations







 Name  Date Admin  Mfg Name  Mfg Code  Trade Name  Lot#  Route  Inj  Vis Given  
Vis Pub  CVX

 

 HPV  2011  [employer]  MSD  GARDASIL  [id number]  Intramuscular  Left 
Deltoid  2011  999

 

 HPV  2011  [employer]  MSD  GARDASIL  42636  Intramuscular  Left 
Deltoid  2011  62

 

 Tdap  2016  GlaxPacketVideoine  SKB  BOOSTRIX  [id number]  Intramuscular  Right 
Deltoid  2016  115







History of Past Illness







 Name  Date of Onset  Comments

 

 Atrial Septal Defect, Ostium Secundum Type      

 

 Prenatal Care, High Risk Pregnancy  2010   

 

    Apr 15 2010  4:29PM   

 

 Prenatal Care, High Risk Pregnancy  Apr 15 2010  4:13PM   

 

    2010  9:54AM   

 

 Prenatal Care, High Risk Pregnancy  2010 10:10AM   

 

    May 13 2010  9:01AM   

 

    John 10 2010  2:45PM   

 

    2010  4:16PM   

 

 Pregnancy, First Normal  Aug 30 2010  9:14AM   

 

 Pregnancy, High Risk  Sep 13 2010  3:13PM   

 

 Group B Strep Screening,   Oct 14 2010  5:43PM   

 

 Pregnancy, High Risk  Oct 14 2010  5:43PM   

 

 Postoperative Examination Following Surgery  Dec  6 2010  9:15AM   

 

 IMPLANON  Insertion  Dec 15 2010 10:44AM   

 

 General Medical Exam, Child  2011  8:58AM   

 

 Contraceptive Management  2011  8:58AM   

 

 Routine gynecological examination  Jul 15 2011 10:03AM   

 

 Contraceptive Counseling  Jul 15 2011 10:03AM   

 

 Vaginal Discharge  Jul 15 2011 10:03AM   

 

 Irregular Menses  Jul 15 2011 10:03AM   

 

 Gardsil (HPV)  2011  8:54AM   

 

 Irregular Menses  Aug 31 2011  9:07AM   

 

 Family Planning  Aug 31 2011  9:07AM   

 

 Contraceptive Management  Andreas  3 2012  9:43AM   

 

 Local Infection  Andreas  3 2012  9:43AM   

 

 Well Child Examination  2012  1:46PM   

 

 Right knee pain  2012  1:46PM   

 

 Right knee pain  2012  1:24PM   

 

 Abscess  2012  1:24PM   

 

 Right knee pain  Mar 26 2012 10:32AM   

 

 General Medical Exam, Adult  2012  1:46PM   

 

 Pregnancy test confirmed positive  2016 11:37AM   

 

 Obesity  2016 11:37AM   

 

 Normal pregnancy in multigravida in second trimester  Mar 28 2016  2:01PM   

 

 History of atrial septal defect repair  May 11 2016  8:50AM   

 

 Normal pregnancy in multigravida in second trimester  2016  2:00PM   

 

 Need for Tdap vaccine  2016  1:38PM   







Payers







 Insurance Name  Company Name  Plan Name  Plan Number  Policy Number  Policy 
Group Number  Start Date

 

    [facility] Plan of [facility]  
   [id number]     N/A

 

    [facility] Program  [place] [employer]     
[id number]     N/A

 

    zzzTest Medicare A  Test Medicare A     void     N/A







History of Encounters







 Visit Date  Visit Type  Provider

 

 2016  Office visit  Dr. Antonella Ventura MD

 

 2016  Office visit  Dr. Antonella Ventura MD

 

 2016  Office visit  Dr. Antonella Ventura MD

 

 2016  Office visit  Dr. Antonella Ventura MD

 

 3/28/2016  Office visit  Dr. Antonella Ventura MD

 

 2016  Office visit  Dr. Antonella Ventura MD

 

 2016  Office visit  Dr. Antonella Ventura MD

 

 2016  Office visit  Velma MAGDACynthia Mendieta APRN

 

 3/26/2012  Office visit  Ivanna Swan APRN

 

 2012  Office visit  Ivanna Swan APRN

 

 2012  Office visit  Ivanna Swan APRN

 

 1/3/2012  Office visit  Ivanna Swan APRN

 

 2011  Nurse visit  Ivanna Swan APRN

 

 2011  Nurse visit  Ivanna Swan APRN

 

 7/15/2011  Office visit  Ivanna Swan APRN

 

 2011  Office visit  Ivanna Swan APRN

 

 12/15/2010  Office visit  Willard Sorensen MD

 

 2010  Surgery  Willard Sorensen MD

 

 2010  Hospital  Willard Sorensen MD

 

 11/15/2010  Office visit  Willard Sorensen MD

 

 11/15/2010  [facility]  Willard Sorensen MD

 

 2010  Office visit  Willard Sorensen MD

 

 2010  Office visit  Willard Sorensen MD

 

 10/28/2010  Office visit  Willard Sorensen MD

 

 10/21/2010  Office visit  Willard Sorensen MD

 

 10/14/2010  Office visit  Willard Sorensen MD

 

 10/9/2010  [facility]  Willard Sorensen MD

 

 10/7/2010  Office visit  Willard Sorensen MD

 

 2010  Office visit  Willard Sorensen MD

 

 2010  Office visit  Willard Sorensen MD

 

 2010  Office visit  Willard Sorensen MD

 

 2010  Office visit  Willard Sorensen MD

 

 2010  Office visit  Willard Sorensen MD

 

 6/10/2010  Office visit  Willard Sorensen MD

 

 2010  Office visit  Willard Sorensen MD

 

 2010  Office visit  Willard Sorensen MD

 

 4/15/2010  Office visit  Willard Sorensen MD

## 2018-11-29 NOTE — XMS REPORT
MU2 Ambulatory Summary

 Created on: 2016



Radha Aviles

External Reference #: [id number]

: 1990

Sex: Female



Demographics







 Address  [address] [place] [address] [address]

[place]  [address]

 

 Home Phone  [phone]

 

 Preferred Language  English

 

 Marital Status  Never 

 

 Faith Affiliation  Unknown

 

 Race  White

 

 Ethnic Group  Not  or 





Author







 Author  Antonella Ventura  [facility]

 

 Address  [address]

[place], [place]  [address]



 

 Phone  [phone]







Care Team Providers







 Care Team Member Name  Role  Phone

 

 Antonella Ventura  PCP  Unavailable







Allergies and Adverse Reactions







 Name  Reaction  Notes

 

 NO KNOWN DRUG ALLERGIES      







Plan of Treatment







 Planned Activity  Comments  Planned Date  Planned Time  Plan/Goal

 

 CULTURE SCREEN ONLY     2016  12:00 AM   







Medications







  

 

 Name  Start Date  Expiration Date  SIG  Comments

 

 NataFort 60 mg iron-1 mg oral tablet  4/15/2010  3/11/2011  take 1 tablet by 
oral route once daily for 30 days   

 

 Diflucan 150 mg oral tablet  2010  take 1 tablet (150 mg) by 
oral route once  for 1 days   

 

 Premarin 1.25 mg oral tablet  2011  take 1 tablet by oral route 
daily for 10 days   

 

 doxycycline hyclate 100 mg oral tablet  7/15/2011  2011  take 1 tablet (
100 mg) by oral route every 12 hours for 10 days   

 

 ibuprofen 800 mg oral tablet  2012  take 1 tablet by oral route 
3 times a day for 30 days   

 

 Bactrim -160 mg oral tablet  2012  take 1 tablet by oral 
route every 12 hours for 7 days   

 

 Macrobid 100 mg oral capsule  2016  take 1 capsule (100 mg) by 
oral route 2 times per day with food for 7 days   









 Discontinued 

 

 Name  Start Date  Discontinued Date  SIG  Comments

 

 atenolol 25 mg oral tablet     2011  take 1/2 tablet by oral route QD   

 

 Medrol (Antwan) 4 mg oral tablets,dose pack  2012  3/26/2012  take as 
directed   







Problem List







 Description  Status  Onset

 

 Prenatal Care, High Risk Pregnancy  Active  2010







Vital Signs







 Date  Time  BP-Sys(mm[Hg]  BP-Marisa(mm[Hg])  HR(bpm)  RR(rpm)  Temp  WT  HT  HC  
BMI  BSA  BMI Percentile  O2 Sat(%)

 

 2016  11:15:00 AM  130 mmHg  70 mmHg  80 bpm  20 rpm     214 lbs  64 in  
   36.73 kg/m2  2.09 m2      

 

 3/26/2012  10:30:00 AM  126 mmHg  62 mmHg  106 bpm  18 rpm  96.8 F  205 lbs  
64 in     35.1878 kg/m  2.0491 m     97 %

 

 2012  1:22:00 PM  120 mmHg  68 mmHg  66 bpm  18 rpm  97 F  202.375 lbs  
64 in     34.74 kg/m2  2.04 m2      

 

 2012  1:44:00 PM  122 mmHg  64 mmHg  68 bpm  18 rpm  98.2 F  198.125 lbs  
64 in     34.0077 kg/m  2.0145 m      

 

 1/3/2012  9:40:00 AM  103 mmHg  77 mmHg  64 bpm  18 rpm  97.7 F  197.125 lbs  
64 in     33.84 kg/m2  2.01 m2      

 

 2011  9:06:00 AM  118 mmHg  68 mmHg  74 bpm     97.4 F  189 lbs          
        

 

 7/15/2011  10:06:00 AM  126 mmHg  70 mmHg  73 bpm  20 rpm  97.9 F  180.5 lbs  
64 in     30.98 kg/m2  1.92 m2  0 %  99 %

 

 2011  8:56:00 AM  96 mmHg  68 mmHg  66 bpm  20 rpm  97.5 F  177.5 lbs  64 
in     30.4675 kg/m  1.9067 m  0 %  98 %

 

 12/15/2010  10:43:00 AM  104 mmHg  64 mmHg  48 bpm     97.8 F  150 lbs        
          

 

 2010  9:15:00 AM  112 mmHg  72 mmHg  49 bpm     98 F  149 lbs  64 in     
25.5755 kg/m  1.747 m  81.5 %   

 

 2010  8:54:00 AM  117 mmHg  65 mmHg  73 bpm  20 rpm  97.8 F  160.5 lbs   
               

 

 2010  9:42:00 AM  134 mmHg  66 mmHg  66 bpm        161 lbs               
   

 

 4/15/2010  4:02:00 PM  135 mmHg  71 mmHg  77 bpm        162 lbs  64 in     
27.81 kg/m2  1.82 m2  89.9 %   







Social History







 Name  Description  Comments

 

 History of tobacco usage     Quit smoking in January - was smoking 1-2cig/day

 

 denies alcohol use      

 

 Tobacco  Former smoker   







History of Procedures







 Date Ordered  Description  Order Status

 

 2011 12:00 AM  URINE PREGNANCY TEST  Reviewed

 

 2011 12:00 AM  IMMUNIZATION ADMIN  Reviewed

 

 2011 12:00 AM  HPV VACCINE 4 VALENT IM  Reviewed

 

 2016 12:00 AM  CYTOPATH C/V THIN LAYER  Returned

 

 2016 12:00 AM  SPECIMEN HANDLING OFFICE-LAB  Reviewed

 

 2016 12:00 AM  N.GONORRHOEAE DNA AMP PROB  Returned

 

 2016 12:00 AM  CHLAMYDIA CULTURE  Returned

 

 2016 12:00 AM  HIV-1ANTIBODY  Returned

 

 2016 12:00 AM  URINALYSIS AUTO W/SCOPE  Returned

 

 2016 12:00 AM  OBSTETRIC PANEL  Returned

 

 2016 12:00 AM  GLUCOSE TOLERANCE TEST (GTT)  Returned

 

 3/28/2016 12:00 AM  ALPHA-FETOPROTEIN SERUM  Returned

 

 3/28/2016 12:00 AM  CHORIONIC GONADOTROPIN TEST  Returned

 

 3/28/2016 12:00 AM  CHORIONIC GONADOTROPIN ASSAY  Returned

 

 5/3/2016 12:00 AM  OB US >/=14 WKS SNGL FETUS  Returned

 

 2016 12:00 AM  RH IG FULL-DOSE IM  Returned

 

 2016 12:00 AM  Type and screen  Returned

 

 2016 12:00 AM  GLUCOSE TOLERANCE TEST (GTT)  Returned

 

 2016 12:00 AM  COMPLETE CBC W/AUTO DIFF WBC  Returned

 

 2012 12:00 AM  X-RAY EXAM KNEE 4 OR MORE  Returned

 

 2016 12:00 AM  TDAP VACCINE 7 YRS/> IM  Reviewed

 

 2016 12:00 AM  IMMUNIZATION ADMIN  Reviewed

 

 4/15/2010 12:00 AM  OBSTETRIC PANEL  Reviewed

 

 4/15/2010 12:00 AM  HIV-1ANTIBODY  Reviewed

 

 4/15/2010 12:00 AM  URINALYSIS NONAUTO W/SCOPE  Reviewed

 

 4/15/2010 12:00 AM  OB US < 14 WKS SINGLE FETUS  Reviewed

 

 2010 12:00 AM  GLUCOSE TEST  Reviewed

 

 2010 12:00 AM  Rhogam  Reviewed

 

 2010 12:00 AM  Type and screen  Reviewed

 

 2010 12:00 AM  COMPLETE CBC W/AUTO DIFF WBC  Reviewed

 

 2010 12:00 AM  COMPLETE CBC W/AUTO DIFF WBC  Reviewed

 

 10/14/2010 12:00 AM  CULTURE OTHR SPECIMN AEROBIC  Reviewed

 

 12/15/2010 12:00 AM  INSERTION IMPLANON, IMPLANTABLE CONTRACEPTIVE CAPSULES  
Reviewed

 

 12/15/2010 12:00 AM  Etonogestrel (contraceptive) implant system, (Implanon)  
Reviewed

 

 7/15/2011 12:00 AM  CYTOPATH TBS C/V MANUAL  Reviewed

 

 7/15/2011 12:00 AM  CHLAMYDIA CULTURE  Reviewed

 

 7/15/2011 12:00 AM  N.GONORRHOEAE DNA AMP PROB  Reviewed

 

 7/15/2011 12:00 AM  URINE PREGNANCY TEST  Reviewed

 

 2011 12:00 AM  IMMUNIZATION ADMIN  Reviewed

 

 2011 12:00 AM  HPV VACCINE 4 VALENT IM  Reviewed







Results Summary







 Data and Description  Results

 

 2010 10:41 AM  RUBELLA 34.0 IU/mLCOLOR YELLOW APPEARANCE HAZY SPEC GRAV 
1.025 pH 7.0 PROTEIN TRACE GLUCOSE NEGATIVE KETONE 15 BILIRUBIN NEGATIVE BLOOD 
NEGATIVE NITRITE NEGATIVE LEUK SCREEN NEGATIVE CASTS/LPF NEGATIVE CRYSTALS 
TRACE AMORPH MUCOUS THRDS FEW BACTERIA NEGATIVE EPITH CELLS FEW SQUAMOUS 
TRICHOMONAS NEGATIVE YEAST NEGATIVE WBC 6.3 RBC 4.57 HGB 13.90 g/dLHCT 40.30 %
MCV 88.0 fLMCH 30.40 pgMCHC 34.50 g/dLMPV 9.20 fLPLT 258 %NEUT 65.10 %%LYMP 
28.30 %#NEUT 4.10 #LYMP 1.80 

 

 2010 3:48 PM  WBC 12.9 RBC 4.02 HGB 12.60 g/dLHCT 37.40 %MCV 93.0 fLMCH 
31.30 pgMCHC 33.70 g/dLRDW CV 14.10 %MPV 9.50 fLPLT 263 %NEUT 69.20 %%LYMP 
22.30 %%MONO 7.60 %%EOS 0.70 %%BASO 0.20 %#NEUT 8.91 #LYMP 2.88 #MONO 0.98 #EOS 
0.09 #BASO 0.03 

 

 10/22/2010 8:21 PM  COLOR YELLOW APPEARANCE CLEAR SPEC GRAV 1.010 pH 6.5 
PROTEIN NEGATIVE GLUCOSE NEGATIVE KETONE 15 BILIRUBIN NEGATIVE BLOOD NEGATIVE 
NITRITE NEGATIVE LEUK SCREEN SMALL CASTS/LPF NEGATIVE CRYSTALS NEGATIVE MUCOUS 
THRDS NEGATIVE BACTERIA 2++ EPITH CELLS 3+++ SQUAMOUS TRICHOMONAS NEGATIVE 
YEAST NEGATIVE 

 

 2010 11:58 PM  COLOR YELLOW APPEARANCE CLEAR SPEC GRAV 1.015 pH 6.5 
PROTEIN NEGATIVE GLUCOSE NEGATIVE KETONE NEGATIVE BILIRUBIN NEGATIVE BLOOD 
NEGATIVE NITRITE NEGATIVE LEUK SCREEN LARGE CASTS/LPF NEGATIVE CRYSTALS 
NEGATIVE MUCOUS THRDS NEGATIVE BACTERIA 2++ EPITH CELLS 2++ SQUAMOUS 
TRICHOMONAS NEGATIVE YEAST NEGATIVE 

 

 11/15/2010 5:30 PM  .0 IU/LURIC ACID 3.7 mg/dLGLUCOSE 66.0 mg/dLSODIUM 
137.0 mmol/LPOTASSIUM 3.10 mmol/LCHLORIDE 105.0 mmol/LCO2 21.0 mmol/LBUN 3.0 mg/
dLCREATININE 0.50 mg/dLSGOT/AST 20.0 IU/LSGPT/ALT 18.0 IU/LALK PHOS 152.0 IU/
LTOTAL PROTEIN 6.20 g/dLALBUMIN 3.40 g/dLTOTAL BILI 0.60 mg/dLCALCIUM 9.10 mg/
dLeGFR >60 mL/min/1.73 m2WBC 16.9 RBC 4.18 HGB 13.30 g/dLHCT 39.70 %MCV 95.0 
fLMCH 31.80 pgMCHC 33.50 g/dLRDW CV 14.0 %MPV 10.10 fLPLT 268 %NEUT 75.70 %%
LYMP 17.50 %%MONO 6.20 %%EOS 0.40 %%BASO 0.20 %#NEUT 12.82 #LYMP 2.97 #MONO 
1.05 #EOS 0.06 #BASO 0.03 

 

 11/15/2010 8:50 PM  PROTEIN UR 14.0 mg/dL

 

 2016 12:21 PM  Pap Smear Collected 

 

 2016 2:00 PM  WBC 9.8 RBC 4.60 HGB 13.60 g/dLHCT 41.40 %MCV 90.0 fLMCH 
29.60 pgMCHC 32.90 g/dLRDW CV 13.80 %MPV 9.70 fLPLT 271 %NEUT 59.80 %%LYMP 33.0 
%%MONO 6.40 %%EOS 0.60 %%BASO 0.20 %#NEUT 5.83 #LYMP 3.22 #MONO 0.62 #EOS 0.06 #
BASO 0.02 HIV AG/AB COMBO 0.14 RPR Non Reactive Rubella Antibodies, IgG 2.88 
Index

 

 2016 2:08 PM  COLOR YELLOW APPEARANCE CLEAR SPEC GRAV <=1.005 pH 6.0 
PROTEIN NEGATIVE GLUCOSE NEGATIVE mg/dLKETONE NEGATIVE BILIRUBIN NEGATIVE BLOOD 
NEGATIVE NITRITE NEGATIVE LEUK SCREEN TRACE CASTS/LPF NEGATIVE /LPFCRYSTALS 
NEGATIVE MUCOUS THRDS NEGATIVE BACTERIA NEGATIVE EPITH CELLS 3+++ SQUAMOUS /
HPFTRICHOMONAS NEGATIVE YEAST NEGATIVE 

 

 3/28/2016 2:18 PM  AFP Value 0.0240 ug/mLAFP MoM 1.02 hCG Value 91826.0 mIU/
mLhCG MoM 1.77 uE3 Value 0.70 ng/mLuE3 MoM 1.16 MARISA Value 207.520 pg/mLDIA MoM 
1.25 OSBR Risk       1 IN 5[address]67 DSR (Second Trimester) 1IN 4435 DSR (By Age)    
1 IN 1003 T18 Risk Not increased T18 (By Age) 1:3907 

 

 2016 3:15 PM  WBC 11.5 RBC 3.75 HGB 11.70 g/dLHCT 35.50 %MCV 95.0 fLMCH 
31.20 pgMCHC 33.0 g/dLRDW CV 14.10 %MPV 9.70 fLPLT 273 %NEUT 73.0 %%LYMP 20.10 %
%MONO 5.70 %%EOS 0.80 %%BASO 0.10 %#NEUT 8.40 #LYMP 2.31 #MONO 0.65 #EOS 0.09 #
BASO 0.01 







History Of Immunizations







 Name  Date Admin  Mfg Name  Mfg Code  Trade Name  Lot#  Route  Inj  Vis Given  
Vis Pub  CVX

 

 HPV  2011  [employer]  MSD  GARDASIL  [id number]  Intramuscular  Left 
Deltoid  2011  999

 

 HPV  2011  [employer]  MSD  GARDASIL  82411  Intramuscular  Left 
Deltoid  2011  62

 

 Tdap  2016  [employer]  SKB  BOOSTRIX  B4G4G  Intramuscular  Right 
Deltoid  2016  115







History of Past Illness







 Name  Date of Onset  Comments

 

 Atrial Septal Defect, Ostium Secundum Type      

 

 Prenatal Care, High Risk Pregnancy  2010   

 

    Apr 15 2010  4:29PM   

 

 Prenatal Care, High Risk Pregnancy  Apr 15 2010  4:13PM   

 

    2010  9:54AM   

 

 Prenatal Care, High Risk Pregnancy  2010 10:10AM   

 

    May 13 2010  9:01AM   

 

    John 10 2010  2:45PM   

 

    2010  4:16PM   

 

 Pregnancy, First Normal  Aug 30 2010  9:14AM   

 

 Pregnancy, High Risk  Sep 13 2010  3:13PM   

 

 Group B Strep Screening,   Oct 14 2010  5:43PM   

 

 Pregnancy, High Risk  Oct 14 2010  5:43PM   

 

 Postoperative Examination Following Surgery  Dec  6 2010  9:15AM   

 

 IMPLANON  Insertion  Dec 15 2010 10:44AM   

 

 General Medical Exam, Child  2011  8:58AM   

 

 Contraceptive Management  2011  8:58AM   

 

 Routine gynecological examination  Jul 15 2011 10:03AM   

 

 Contraceptive Counseling  Jul 15 2011 10:03AM   

 

 Vaginal Discharge  Jul 15 2011 10:03AM   

 

 Irregular Menses  Jul 15 2011 10:03AM   

 

 Gardsil (HPV)  2011  8:54AM   

 

 Irregular Menses  Aug 31 2011  9:07AM   

 

 Family Planning  Aug 31 2011  9:07AM   

 

 Contraceptive Management  Andreas  3 2012  9:43AM   

 

 Local Infection  Andreas  3 2012  9:43AM   

 

 Well Child Examination  2012  1:46PM   

 

 Right knee pain  2012  1:46PM   

 

 Right knee pain  2012  1:24PM   

 

 Abscess  2012  1:24PM   

 

 Right knee pain  Mar 26 2012 10:32AM   

 

 General Medical Exam, Adult  2012  1:46PM   

 

 Pregnancy test confirmed positive  2016 11:37AM   

 

 Obesity  2016 11:37AM   

 

 Normal pregnancy in multigravida in second trimester  Mar 28 2016  2:01PM   

 

 History of atrial septal defect repair  May 11 2016  8:50AM   

 

 Normal pregnancy in multigravida in second trimester  2016  2:00PM   

 

 Need for Tdap vaccine  2016  1:38PM   

 

 Group B Strep Screening,   Aug 23 2016  3:43PM   

 

 Normal pregnancy in multigravida in third trimester  Aug 23 2016  3:43PM   







Payers







 Insurance Name  Company Name  Plan Name  Plan Number  Policy Number  Policy 
Group Number  Start Date

 

    [facility] [employer] [employer] [facility]  
   [id number]     N/A

 

    [place] [employer]  [place] [employer]     
[id number]     N/A

 

    [id number] Medicare A  Test Medicare A     void     N/A







History of Encounters







 Visit Date  Visit Type  Provider

 

 2016  Office visit  Dr. Antonella Ventura MD

 

 2016  Office visit  Dr. Antonella Ventura MD

 

 2016  Office visit  Dr. Antonella Ventura MD

 

 2016  Office visit  Dr. Antonella Ventura MD

 

 2016  Office visit  Dr. Antonella Ventura MD

 

 3/28/2016  Office visit  Dr. Antonella Ventura MD

 

 2016  Office visit  Dr. Antonella Ventura MD

 

 2016  Office visit  Dr. Antonella Ventura MD

 

 2016  Office visit  Velma Mendieta APRN

 

 3/26/2012  Office visit  Ivanna Walker APRN

 

 2012  Office visit  Ivanna Walker APRN

 

 2012  Office visit  Ivanna Walker APRN

 

 1/3/2012  Office visit  Ivanna Walker APRN

 

 2011  Nurse visit  Ivanna Walker APRN

 

 2011  Nurse visit  Ivanna Walker APRN

 

 7/15/2011  Office visit  Ivanna Walker APRN

 

 2011  Office visit  Ivanna Walker APRN

 

 12/15/2010  Office visit  Willard Sorensen MD

 

 2010  Surgery  Willard Sorensen MD

 

 2010  [facility]  Willard Sorensen MD

 

 11/15/2010  Office visit  Willard Sorensen MD

 

 11/15/2010  [facility]  Willard Sorensen MD

 

 2010  Office visit  Willard Sorensen MD

 

 2010  Office visit  Willard Sorensen MD

 

 10/28/2010  Office visit  Willard Sorensen MD

 

 10/21/2010  Office visit  Willard Sorensen MD

 

 10/14/2010  Office visit  Willard Sorensen MD

 

 10/9/2010  [facility]  Willard Sorensen MD

 

 10/7/2010  Office visit  Willard Sorensen MD

 

 2010  Office visit  Willard Sorensen MD

 

 2010  Office visit  Willard Sorensen MD

 

 2010  Office visit  Willard Sorensen MD

 

 2010  Office visit  Willard Sorensen MD

 

 2010  Office visit  Willard Sorensen MD

 

 6/10/2010  Office visit  Willard Sorensen MD

 

 2010  Office visit  Willard Sorensen MD

 

 2010  Office visit  Willard Sorensen MD

 

 4/15/2010  Office visit  Willard Sorensen MD

## 2018-11-29 NOTE — XMS REPORT
Patient Summary (HL7 CCD)

 Created on: 2017



BLANCA GUTIÉRREZ

External Reference #: [id number]

: 1990

Sex: Female



Demographics







 Address  [address]

[place]  [address]

 

 Home Phone  [phone]

 

 Preferred Language  Unknown

 

 Marital Status  Unknown

 

 [place] Affiliation  Unknown

 

 Race  White

 

 Ethnic Group  Not  or 





Author







 Author  SIMON MILLER

 

 Organization  Unknown

 

 Address  [address]

[place]  [address]



 

 Phone  [phone]







Care Team Providers







 Care Team Member Name  Role  Phone

 

 JYOTI BLISS MD  Attphys  [phone]

 

 MARYA NIELSON  [phone]



                                                                



Allergies

          





 Allergy        Code        Allergy Type        Reaction        Status    

 

 No Known Allergies        0        Drug allergy                 Active    



                                                                               
         



Active Medications

                                                                               
                                                                               
                                                                               
                                                                               
                                                                               
             



Problems

          





 Problem        Code        Start Date        Resolved Date        Status    

 

 Deliveries by         [id number]        2016                 
Active    



                                                                               
         



Procedures

          





 Procedure        Code        Procedure Type        Date    

 

 Extraction of Products of Conception, Low Cervical, Open Approach        
08A46B3        ICD-10 PCS        2016    

 

 Excision of Bilateral Fallopian Tubes, Open Approach        6FM25UJ        ICD-
10 PCS        2016    

 

 Repair Abdomen Skin, External Approach        9HL0FXY        ICD-10 PCS        
2016    

 

 FETAL SCREEN        174779995        SNOMED CT        2016    

 

 RHIG        717840059        SNOMED CT        2016    

 

 HEMOGRAM        63263215        SNOMED CT        2016    

 

 PATHOLOGY ORDER        900202704        SNOMED CT        2016    

 

 TYPE AND SCREEN        57345883        SNOMED CT        2016    

 

 HEMOGRAM        60901721        SNOMED CT        2016    

 

 INCENTIVE SPIROMETRY EA 15 MINUTES        702789228        SNOMED CT        2016    



                                                                               
                                                                               
                    



Results

          







 HEMOGRAM - Collect Date/Time: 2016 06:00     

 

 Test Name        Code        Test Result        Test Units        Test Ref 
Range    

 

 WBC        56431-8        14.0        TH/CMM        L=4.5      H=10.8    

 

 RBC        789-8        3.41        ML/CMM        L=4.20     H=5.40    

 

 HGB        718-7        10.1        G/DL        L=12.0     H=16.0    

 

 HCT        4544-3        32.3        %        L=37.0     H=47.0    

 

 MCV        35437-4        95        FL        L=81       H=99    

 

 MCH        68079-9        29.6        PG        L=27.0     H=33.0    

 

 MCHC        32955-2        31.3        G/DL        L=31.0     H=36.0    

 

 RDW SD        42533-6        49        FL        L=36       H=50    

 

 RDW CV        39087-5        14.3        %        L=0.0      H=14.8    

 

 MPV        34999-8        9.7        FL        L=9.3      H=12.5    

 

 PLT        777-3        271        TH/CMM        L=130      H=440    

 

 NRBC#        42878-8        0.00        TH/CMM        L=0.00     H=0.00    

 

 NRBC%        88981-2        0.0        /100WBC        L=0.0      H=2.0    











 HEMOGRAM - Collect Date/Time: 2016 10:45     

 

 Test Name        Code        Test Result        Test Units        Test Ref 
Range    

 

 WBC        50518-3        10.7        TH/CMM        L=4.5      H=10.8    

 

 RBC        789-8        3.83        ML/CMM        L=4.20     H=5.40    

 

 HGB        718-7        11.4        G/DL        L=12.0     H=16.0    

 

 HCT        4544-3        35.9        %        L=37.0     H=47.0    

 

 MCV        34697-0        94        FL        L=81       H=99    

 

 MCH        17175-8        29.8        PG        L=27.0     H=33.0    

 

 MCHC        47941-0        31.8        G/DL        L=31.0     H=36.0    

 

 RDW SD        72319-4        48        FL        L=36       H=50    

 

 RDW CV        53686-0        14.0        %        L=0.0      H=14.8    

 

 MPV        76673-3        9.5        FL        L=9.3      H=12.5    

 

 PLT        777-3        321        TH/CMM        L=130      H=440    

 

 NRBC#        09250-4        0.00        TH/CMM        L=0.00     H=0.00    

 

 NRBC%        22923-3        0.0        /100WBC        L=0.0      H=2.0    











 FETAL SCREEN - Collect Date/Time: 2016 06:00     

 

 Test Name        Code        Test Result        Test Units        Test Ref 
Range    

 

 Fetal Bleed Screen        35377-9        Negative        N/A             











 RHIG - Collect Date/Time: 2016 06:00     

 

 Test Name        Code        Test Result        Test Units        Test Ref 
Range    

 

 Volume        68226-3        300                      

 

 Quantity        53752-7        1                      

 

 Lot Number        [id number]        [id number]                      

 

 Product Identification        10413-3        Rh Immune Globulin        N/A    
         











 TYPE AND SCREEN - Collect Date/Time: 2016 10:45     

 

 Test Name        Code        Test Result        Test Units        Test Ref 
Range    

 

 ABO/Rh Type        895-3        A Negative        N/A             

 

 Antibody Screen-Gel        895-3        Negative        N/A             



                                                                               
                             



Function Status

          Unknown or Not Available.                                            
                                  



History of Immunizations

          Unknown or Not Available.                                            
                        



Plan of Treatment

          Unknown or Not Available.                                            
                        



Social History

          





 Smoking Status        Code        Start Date        End Date    

 

 Never smoker        [id number]                      



                                                                               
         



Vital Signs

          





 Vital Sign        Value        Unit        Date/Time        Recent/Initial?    

 

 BP Systolic        105        mm[Hg]        2016 14:30        Initial VS
    

 

 BP Diastolic        52        mm[Hg]        2016 14:30        Initial VS
    

 

 Respiratory Rate        16        /min        2016 14:30        Initial 
VS    

 

 Heart Rate        65        /min        2016 14:30        Initial VS    

 

 O2 % BldC Oximetry        100        %        2016 14:30        Initial 
VS    

 

 Body Temperature        97.3        [degF]        2016 14:30        
Initial VS    

 

 Weight Measured        178        [lb_av]        2016 16:31        
Initial VS    

 

 Height        64        [in_i]        2016 16:31        Initial VS    

 

 BMI (Body Mass Index)        30.55        kg/m2        2016 16:31        
Initial VS    

 

 BSA (Body Surface Area)        1.91        m2        2016 16:31        
Initial VS    

 

 BP Systolic        124        mm[Hg]        2016 09:40        Most 
Recent VS    

 

 BP Diastolic        70        mm[Hg]        2016 09:40        Most 
Recent VS    

 

 Respiratory Rate        18        /min        2016 09:40        Most 
Recent VS    

 

 Heart Rate        88        /min        2016 09:40        Most Recent VS
    

 

 O2 % BldC Oximetry        98        %        2016 09:40        Most 
Recent VS    

 

 Body Temperature        98.1        [degF]        2016 09:40        Most 
Recent VS    



                                                                               
                                                                     



Function Status

          Unknown or Not Available.                                            
    



Goals

          Unknown or Not Available.                                            
              



ASSESSMENTS

          Unknown or Not Available.                                            
                        



Health Concerns Section

          Unknown or Not Available.

## 2018-11-29 NOTE — XMS REPORT
MU2 Ambulatory Summary

 Created on: 2016



Radha Aviles

External Reference #: [id number]

: 1990

Sex: Female



Demographics







 Address  [address]

[place]  [address]

 

 Home Phone  [phone]

 

 Preferred Language  English

 

 Marital Status  Never 

 

 Evangelical Affiliation  Unknown

 

 Race  White

 

 Ethnic Group  Not  or 





Author







 Author  Antonella Ventura

 

 [facility]

 

 Address  [address]

[place], [place]  [id number]



 

 Phone  [phone]







Care Team Providers







 Care Team Member Name  Role  Phone

 

 Antonella Ventura  PCP  Unavailable







Allergies and Adverse Reactions







 Name  Reaction  Notes

 

 NO KNOWN DRUG ALLERGIES      







Plan of Treatment

Not available.



Medications







 Active 

 

 Name  Start Date  Estimated Completion Date  SIG  Comments

 

 Macrobid 100 mg oral capsule  2016  take 1 capsule (100 mg) by 
oral route 2 times per day with food for 7 days   









  

 

 Name  Start Date  Expiration Date  SIG  Comments

 

 [place] 60 mg iron-1 mg oral tablet  4/15/2010  3/11/2011  take 1 tablet by 
oral route once daily for 30 days   

 

 Diflucan 150 mg oral tablet  2010  take 1 tablet (150 mg) by 
oral route once  for 1 days   

 

 Premarin 1.25 mg oral tablet  2011  take 1 tablet by oral route 
daily for 10 days   

 

 doxycycline hyclate 100 mg oral tablet  7/15/2011  2011  take 1 tablet (
100 mg) by oral route every 12 hours for 10 days   

 

 ibuprofen 800 mg oral tablet  2012  take 1 tablet by oral route 
3 times a day for 30 days   

 

 Bactrim -160 mg oral tablet  2012  take 1 tablet by oral 
route every 12 hours for 7 days   









 Discontinued 

 

 Name  Start Date  Discontinued Date  SIG  Comments

 

 atenolol 25 mg oral tablet     2011  take 1/2 tablet by oral route QD   

 

 Medrol (Antwan) 4 mg oral tablets,dose pack  2012  3/26/2012  take as 
directed   







Problem List







 Description  Status  Onset

 

 Prenatal Care, High Risk Pregnancy  Active  2010







Vital Signs







 Date  Time  BP-Sys(mm[Hg]  BP-Marisa(mm[Hg])  HR(bpm)  RR(rpm)  Temp  WT  HT  HC  
BMI  BSA  BMI Percentile  O2 Sat(%)

 

 2016  11:15:00 AM  130 mmHg  70 mmHg  80 bpm  20 rpm     214 lbs  64 in  
   36.73 kg/m2  2.09 m2      

 

 3/26/2012  10:30:00 AM  126 mmHg  62 mmHg  106 bpm  18 rpm  96.8 F  205 lbs  
64 in     35.1878 kg/m  2.0491 m     97 %

 

 2012  1:22:00 PM  120 mmHg  68 mmHg  66 bpm  18 rpm  97 F  202.375 lbs  
64 in     34.74 kg/m2  2.04 m2      

 

 2012  1:44:00 PM  122 mmHg  64 mmHg  68 bpm  18 rpm  98.2 F  198.125 lbs  
64 in     34.0077 kg/m  2.0145 m      

 

 1/3/2012  9:40:00 AM  103 mmHg  77 mmHg  64 bpm  18 rpm  97.7 F  197.125 lbs  
64 in     33.84 kg/m2  2.01 m2      

 

 2011  9:06:00 AM  118 mmHg  68 mmHg  74 bpm     97.4 F  189 lbs          
        

 

 7/15/2011  10:06:00 AM  126 mmHg  70 mmHg  73 bpm  20 rpm  97.9 F  180.5 lbs  
64 in     30.98 kg/m2  1.92 m2  0 %  99 %

 

 2011  8:56:00 AM  96 mmHg  68 mmHg  66 bpm  20 rpm  97.5 F  177.5 lbs  64 
in     30.4675 kg/m  1.9067 m  0 %  98 %

 

 12/15/2010  10:43:00 AM  104 mmHg  64 mmHg  48 bpm     97.8 F  150 lbs        
          

 

 2010  9:15:00 AM  112 mmHg  72 mmHg  49 bpm     98 F  149 lbs  64 in     
25.5755 kg/m  1.747 m  81.5 %   

 

 2010  8:54:00 AM  117 mmHg  65 mmHg  73 bpm  20 rpm  97.8 F  160.5 lbs   
               

 

 2010  9:42:00 AM  134 mmHg  66 mmHg  66 bpm        161 lbs               
   

 

 4/15/2010  4:02:00 PM  135 mmHg  71 mmHg  77 bpm        162 lbs  64 in     
27.81 kg/m2  1.82 m2  89.9 %   







Social History







 Name  Description  Comments

 

 History of tobacco usage     Quit smoking in January - was smoking 1-2cig/day

 

 denies alcohol use      

 

 Tobacco  Former smoker   







History of Procedures







 Date Ordered  Description  Order Status

 

 2011 12:00 AM  URINE PREGNANCY TEST  Reviewed

 

 2011 12:00 AM  IMMUNIZATION ADMIN  Reviewed

 

 2011 12:00 AM  HPV VACCINE 4 VALENT IM  Reviewed

 

 2016 12:00 AM  CYTOPATH C/V THIN LAYER  Returned

 

 2016 12:00 AM  SPECIMEN HANDLING OFFICE-LAB  Reviewed

 

 2016 12:00 AM  N.GONORRHOEAE DNA AMP PROB  Returned

 

 2016 12:00 AM  CHLAMYDIA CULTURE  Returned

 

 2016 12:00 AM  HIV-1ANTIBODY  Returned

 

 2016 12:00 AM  URINALYSIS AUTO W/SCOPE  Returned

 

 2016 12:00 AM  OBSTETRIC PANEL  Returned

 

 2016 12:00 AM  GLUCOSE TOLERANCE TEST (GTT)  Returned

 

 2012 12:00 AM  X-RAY EXAM KNEE 4 OR MORE  Returned

 

 4/15/2010 12:00 AM  OBSTETRIC PANEL  Reviewed

 

 4/15/2010 12:00 AM  HIV-1ANTIBODY  Reviewed

 

 4/15/2010 12:00 AM  URINALYSIS NONAUTO W/SCOPE  Reviewed

 

 4/15/2010 12:00 AM  OB US < 14 WKS SINGLE FETUS  Reviewed

 

 2010 12:00 AM  GLUCOSE TEST  Reviewed

 

 2010 12:00 AM  Rhogam  Reviewed

 

 2010 12:00 AM  Type and screen  Reviewed

 

 2010 12:00 AM  COMPLETE CBC W/AUTO DIFF WBC  Reviewed

 

 2010 12:00 AM  COMPLETE CBC W/AUTO DIFF WBC  Reviewed

 

 10/14/2010 12:00 AM  CULTURE OTHR SPECIMN AEROBIC  Reviewed

 

 12/15/2010 12:00 AM  INSERTION IMPLANON, IMPLANTABLE CONTRACEPTIVE CAPSULES  
Reviewed

 

 12/15/2010 12:00 AM  Etonogestrel (contraceptive) implant system, (Implanon)  
Reviewed

 

 7/15/2011 12:00 AM  CYTOPATH TBS C/V MANUAL  Reviewed

 

 7/15/2011 12:00 AM  CHLAMYDIA CULTURE  Reviewed

 

 7/15/2011 12:00 AM  N.GONORRHOEAE DNA AMP PROB  Reviewed

 

 7/15/2011 12:00 AM  URINE PREGNANCY TEST  Reviewed

 

 2011 12:00 AM  IMMUNIZATION ADMIN  Reviewed

 

 2011 12:00 AM  HPV VACCINE 4 VALENT IM  Reviewed







Results Summary







 Data and Description  Results

 

 2010 10:41 AM  RUBELLA 34.0 IU/mLCOLOR YELLOW APPEARANCE HAZY SPEC GRAV 
1.025 pH 7.0 PROTEIN TRACE GLUCOSE NEGATIVE KETONE 15 BILIRUBIN NEGATIVE BLOOD 
NEGATIVE NITRITE NEGATIVE LEUK SCREEN NEGATIVE CASTS/LPF NEGATIVE CRYSTALS 
TRACE AMORPH MUCOUS THRDS FEW BACTERIA NEGATIVE EPITH CELLS FEW SQUAMOUS 
TRICHOMONAS NEGATIVE YEAST NEGATIVE WBC 6.3 RBC 4.57 HGB 13.90 g/dLHCT 40.30 %
MCV 88.0 fLMCH 30.40 pgMCHC 34.50 g/dLMPV 9.20 fLPLT 258 %NEUT 65.10 %%LYMP 
28.30 %#NEUT 4.10 #LYMP 1.80 

 

 2010 3:48 PM  WBC 12.9 RBC 4.02 HGB 12.60 g/dLHCT 37.40 %MCV 93.0 fLMCH 
31.30 pgMCHC 33.70 g/dLRDW CV 14.10 %MPV 9.50 fLPLT 263 %NEUT 69.20 %%LYMP 
22.30 %%MONO 7.60 %%EOS 0.70 %%BASO 0.20 %#NEUT 8.91 #LYMP 2.88 #MONO 0.98 #EOS 
0.09 #BASO 0.03 

 

 10/22/2010 8:21 PM  COLOR YELLOW APPEARANCE CLEAR SPEC GRAV 1.010 pH 6.5 
PROTEIN NEGATIVE GLUCOSE NEGATIVE KETONE 15 BILIRUBIN NEGATIVE BLOOD NEGATIVE 
NITRITE NEGATIVE LEUK SCREEN SMALL CASTS/LPF NEGATIVE CRYSTALS NEGATIVE MUCOUS 
THRDS NEGATIVE BACTERIA 2++ EPITH CELLS 3+++ SQUAMOUS TRICHOMONAS NEGATIVE 
YEAST NEGATIVE 

 

 2010 11:58 PM  COLOR YELLOW APPEARANCE CLEAR SPEC GRAV 1.015 pH 6.5 
PROTEIN NEGATIVE GLUCOSE NEGATIVE KETONE NEGATIVE BILIRUBIN NEGATIVE BLOOD 
NEGATIVE NITRITE NEGATIVE LEUK SCREEN LARGE CASTS/LPF NEGATIVE CRYSTALS 
NEGATIVE MUCOUS THRDS NEGATIVE BACTERIA 2++ EPITH CELLS 2++ SQUAMOUS 
TRICHOMONAS NEGATIVE YEAST NEGATIVE 

 

 11/15/2010 5:30 PM  .0 IU/LURIC ACID 3.7 mg/dLGLUCOSE 66.0 mg/dLSODIUM 
137.0 mmol/LPOTASSIUM 3.10 mmol/LCHLORIDE 105.0 mmol/LCO2 21.0 mmol/LBUN 3.0 mg/
dLCREATININE 0.50 mg/dLSGOT/AST 20.0 IU/LSGPT/ALT 18.0 IU/LALK PHOS 152.0 IU/
LTOTAL PROTEIN 6.20 g/dLALBUMIN 3.40 g/dLTOTAL BILI 0.60 mg/dLCALCIUM 9.10 mg/
dLeGFR >60 mL/min/1.73 m2WBC 16.9 RBC 4.18 HGB 13.30 g/dLHCT 39.70 %MCV 95.0 
fLMCH 31.80 pgMCHC 33.50 g/dLRDW CV 14.0 %MPV 10.10 fLPLT 268 %NEUT 75.70 %%
LYMP 17.50 %%MONO 6.20 %%EOS 0.40 %%BASO 0.20 %#NEUT 12.82 #LYMP 2.97 #MONO 
1.05 #EOS 0.06 #BASO 0.03 

 

 11/15/2010 8:50 PM  PROTEIN UR 14.0 mg/dL

 

 2016 12:21 PM  Pap Smear Collected 

 

 2016 2:00 PM  WBC 9.8 RBC 4.60 HGB 13.60 g/dLHCT 41.40 %MCV 90.0 fLMCH 
29.60 pgMCHC 32.90 g/dLRDW CV 13.80 %MPV 9.70 fLPLT 271 %NEUT 59.80 %%LYMP 33.0 
%%MONO 6.40 %%EOS 0.60 %%BASO 0.20 %#NEUT 5.83 #LYMP 3.22 #MONO 0.62 #EOS 0.06 #
BASO 0.02 HIV AG/AB COMBO 0.14 RPR Non Reactive Rubella Antibodies, IgG 2.88 
Index

 

 2016 2:08 PM  COLOR YELLOW APPEARANCE CLEAR SPEC GRAV <=1.005 pH 6.0 
PROTEIN NEGATIVE GLUCOSE NEGATIVE mg/dLKETONE NEGATIVE BILIRUBIN NEGATIVE BLOOD 
NEGATIVE NITRITE NEGATIVE LEUK SCREEN TRACE CASTS/LPF NEGATIVE /LPFCRYSTALS 
NEGATIVE MUCOUS THRDS NEGATIVE BACTERIA NEGATIVE EPITH CELLS 3+++ SQUAMOUS /
HPFTRICHOMONAS NEGATIVE YEAST NEGATIVE 







History Of Immunizations







 Name  Date Admin  Mfg Name  Mfg Code  Trade Name  Lot#  Route  Inj  Vis Given  
Vis Pub  CVX

 

 HPV  2011  [employer]  MSD  GARDASIL  [id number]  Intramuscular  Left 
Deltoid  2011  999

 

 HPV  2011  [employer]  MSD  GARDASIL  [id number]  Intramuscular  Left 
Deltoid  2011  62







History of Past Illness







 Name  Date of Onset  Comments

 

 Atrial Septal Defect, Ostium Secundum Type      

 

 Prenatal Care, High Risk Pregnancy  2010   

 

    Apr 15 2010  4:29PM   

 

 Prenatal Care, High Risk Pregnancy  Apr 15 2010  4:13PM   

 

    2010  9:54AM   

 

 Prenatal Care, High Risk Pregnancy  2010 10:10AM   

 

    May 13 2010  9:01AM   

 

    John 10 2010  2:45PM   

 

    2010  4:16PM   

 

 Pregnancy, First Normal  Aug 30 2010  9:14AM   

 

 Pregnancy, High Risk  Sep 13 2010  3:13PM   

 

 Group B Strep Screening,   Oct 14 2010  5:43PM   

 

 Pregnancy, High Risk  Oct 14 2010  5:43PM   

 

 Postoperative Examination Following Surgery  Dec  6 2010  9:15AM   

 

 IMPLANON  Insertion  Dec 15 2010 10:44AM   

 

 General Medical Exam, Child  2011  8:58AM   

 

 Contraceptive Management  2011  8:58AM   

 

 Routine gynecological examination  Jul 15 2011 10:03AM   

 

 Contraceptive Counseling  Jul 15 2011 10:03AM   

 

 Vaginal Discharge  Jul 15 2011 10:03AM   

 

 Irregular Menses  Jul 15 2011 10:03AM   

 

 Gardsil (HPV)  2011  8:54AM   

 

 Irregular Menses  Aug 31 2011  9:07AM   

 

 Family Planning  Aug 31 2011  9:07AM   

 

 Contraceptive Management  Andreas  3 2012  9:43AM   

 

 Local Infection  Andreas  3 2012  9:43AM   

 

 Well Child Examination  2012  1:46PM   

 

 Right knee pain  2012  1:46PM   

 

 Right knee pain  2012  1:24PM   

 

 Abscess  2012  1:24PM   

 

 Right knee pain  Mar 26 2012 10:32AM   

 

 General Medical Exam, Adult  2012  1:46PM   

 

 Pregnancy test confirmed positive  2016 11:37AM   

 

 Obesity  2016 11:37AM   







Payers







 Insurance Name  Company Name  Plan Name  Plan Number  Policy Number  Policy 
Group Number  Start Date

 

    [employer]  [employer] [employer]     
[id number]     N/A

 

    [facility]     void     N/A







History of Encounters







 Visit Date  Visit Type  Provider

 

 2016  Office visit  Dr. Antonella Ventura MD

 

 2016  Office visit  Velma Mendieta APRN

 

 3/26/2012  Office visit  Ivanna Swan APRN

 

 2012  Office visit  Ivanna wSan APRN

 

 2012  Office visit  Ivanna Swan APRN

 

 1/3/2012  Office visit  Ivanna Swan APRN

 

 2011  Nurse visit  Ivanna Swan APRN

 

 2011  Nurse visit  Ivanna Swan APRN

 

 7/15/2011  Office visit  Ivanna Swan APRN

 

 2011  Office visit  Ivanna Swan APRN

 

 12/15/2010  Office visit  Willard Sorensen MD

 

 2010  Surgery  Willard Sorensen MD

 

 2010  Hospital  Willard Sorensen MD

 

 11/15/2010  Office visit  Willard Sorensen MD

 

 11/15/2010  [facility]  Willard Sorensen MD

 

 2010  Office visit  Willard Sorensen MD

 

 2010  Office visit  Willard Sorensen MD

 

 10/28/2010  Office visit  Willard Sorensen MD

 

 10/21/2010  Office visit  Willard Sorensen MD

 

 10/14/2010  Office visit  Willard Sorensen MD

 

 10/9/2010  [facility]  Willard Sorensen MD

 

 10/7/2010  Office visit  Willard Sorensen MD

 

 2010  Office visit  Willard Sorensen MD

 

 2010  Office visit  Willard Sorensen MD

 

 2010  Office visit  Willard Sorensen MD

 

 2010  Office visit  Willard Sorensen MD

 

 2010  Office visit  Willard Sorensen MD

 

 6/10/2010  Office visit  Willard Sorensen MD

 

 2010  Office visit  Willard Sorensen MD

 

 2010  Office visit  Willard Sorensen MD

 

 4/15/2010  Office visit  Willard Sorensen MD

## 2018-11-29 NOTE — XMS REPORT
MU2 Ambulatory Summary

 Created on: 2016



Radha Aviles

External Reference #: [id number]

: 1990

Sex: Female



Demographics







 Address  [address] [place] [address] [address]

[place]  [address]

 

 Home Phone  [phone]

 

 Preferred Language  English

 

 Marital Status  Never 

 

 Jew Affiliation  Unknown

 

 Race  White

 

 Ethnic Group  Not  or 





Author







 Author  Megha Piedra

 

 [facility]

 

 Address  [address]

[place], [place]  [id number]



 

 Phone  [phone]







Care Team Providers







 Care Team Member Name  Role  Phone

 

 Megha Piedra  PCP  Unavailable







Allergies and Adverse Reactions







 Name  Reaction  Notes

 

 NO KNOWN DRUG ALLERGIES      







Plan of Treatment







 Planned Activity  Comments  Planned Date  Planned Time  Plan/Goal

 

 OB US LIMITED FETUS(S)     2016  12:00 AM   







Medications







  

 

 Name  Start Date  Expiration Date  SIG  Comments

 

 NataFort 60 mg iron-1 mg oral tablet  4/15/2010  3/11/2011  take 1 tablet by 
oral route once daily for 30 days   

 

 Diflucan 150 mg oral tablet  2010  take 1 tablet (150 mg) by 
oral route once  for 1 days   

 

 Premarin 1.25 mg oral tablet  2011  take 1 tablet by oral route 
daily for 10 days   

 

 doxycycline hyclate 100 mg oral tablet  7/15/2011  2011  take 1 tablet (
100 mg) by oral route every 12 hours for 10 days   

 

 ibuprofen 800 mg oral tablet  2012  take 1 tablet by oral route 
3 times a day for 30 days   

 

 Bactrim -160 mg oral tablet  2012  take 1 tablet by oral 
route every 12 hours for 7 days   

 

 Macrobid 100 mg oral capsule  2016  take 1 capsule (100 mg) by 
oral route 2 times per day with food for 7 days   









 Discontinued 

 

 Name  Start Date  Discontinued Date  SIG  Comments

 

 atenolol 25 mg oral tablet     2011  take 1/2 tablet by oral route QD   

 

 Medrol (Antwan) 4 mg oral tablets,dose pack  2012  3/26/2012  take as 
directed   







Problem List







 Description  Status  Onset

 

 Prenatal Care, High Risk Pregnancy  Active  2010







Vital Signs







 Date  Time  BP-Sys(mm[Hg]  BP-Marisa(mm[Hg])  HR(bpm)  RR(rpm)  Temp  WT  HT  HC  
BMI  BSA  BMI Percentile  O2 Sat(%)

 

 2016  3:19:00 PM  121 mmHg  59 mmHg  91 bpm        178 lbs  64 in     
30.55 kg/m2  1.91 m2      

 

 2016  11:15:00 AM  130 mmHg  70 mmHg  80 bpm  20 rpm     214 lbs  64 in  
   36.7326 kg/m  2.0936 m      

 

 3/26/2012  10:30:00 AM  126 mmHg  62 mmHg  106 bpm  18 rpm  96.8 F  205 lbs  
64 in     35.19 kg/m2  2.05 m2     97 %

 

 2012  1:22:00 PM  120 mmHg  68 mmHg  66 bpm  18 rpm  97 F  202.375 lbs  
64 in     34.7372 kg/m  2.036 m      

 

 2012  1:44:00 PM  122 mmHg  64 mmHg  68 bpm  18 rpm  98.2 F  198.125 lbs  
64 in     34.01 kg/m2  2.01 m2      

 

 1/3/2012  9:40:00 AM  103 mmHg  77 mmHg  64 bpm  18 rpm  97.7 F  197.125 lbs  
64 in     33.8361 kg/m  2.0094 m      

 

 2011  9:06:00 AM  118 mmHg  68 mmHg  74 bpm     97.4 F  189 lbs          
        

 

 7/15/2011  10:06:00 AM  126 mmHg  70 mmHg  73 bpm  20 rpm  97.9 F  180.5 lbs  
64 in     30.9824 kg/m  1.9228 m  0 %  99 %

 

 2011  8:56:00 AM  96 mmHg  68 mmHg  66 bpm  20 rpm  97.5 F  177.5 lbs  64 
in     30.47 kg/m2  1.91 m2  0 %  98 %

 

 12/15/2010  10:43:00 AM  104 mmHg  64 mmHg  48 bpm     97.8 F  150 lbs        
          

 

 2010  9:15:00 AM  112 mmHg  72 mmHg  49 bpm     98 F  149 lbs  64 in     
25.58 kg/m2  1.75 m2  81.5 %   

 

 2010  8:54:00 AM  117 mmHg  65 mmHg  73 bpm  20 rpm  97.8 F  160.5 lbs   
               

 

 2010  9:42:00 AM  134 mmHg  66 mmHg  66 bpm        161 lbs               
   

 

 4/15/2010  4:02:00 PM  135 mmHg  71 mmHg  77 bpm        162 lbs  64 in     
27.807 kg/m  1.8216 m  89.9 %   







Social History







 Name  Description  Comments

 

 History of tobacco usage     Quit smoking in January - was smoking 1-2cig/day

 

 denies alcohol use      

 

 Tobacco  Former smoker   







History of Procedures







 Date Ordered  Description  Order Status

 

 2011 12:00 AM  URINE PREGNANCY TEST  Reviewed

 

 2011 12:00 AM  IMMUNIZATION ADMIN  Reviewed

 

 2011 12:00 AM  HPV VACCINE 4 VALENT IM  Reviewed

 

 2016 12:00 AM  CYTOPATH C/V THIN LAYER  Returned

 

 2016 12:00 AM  SPECIMEN HANDLING OFFICE-LAB  Reviewed

 

 2016 12:00 AM  N.GONORRHOEAE DNA AMP PROB  Returned

 

 2016 12:00 AM  CHLAMYDIA CULTURE  Returned

 

 2016 12:00 AM  HIV-1ANTIBODY  Returned

 

 2016 12:00 AM  URINALYSIS AUTO W/SCOPE  Returned

 

 2016 12:00 AM  OBSTETRIC PANEL  Returned

 

 2016 12:00 AM  GLUCOSE TOLERANCE TEST (GTT)  Returned

 

 3/28/2016 12:00 AM  ALPHA-FETOPROTEIN SERUM  Returned

 

 3/28/2016 12:00 AM  CHORIONIC GONADOTROPIN TEST  Returned

 

 3/28/2016 12:00 AM  CHORIONIC GONADOTROPIN ASSAY  Returned

 

 5/3/2016 12:00 AM  OB US >/=14 WKS SNGL FETUS  Returned

 

 2016 12:00 AM  RH IG FULL-DOSE IM  Returned

 

 2016 12:00 AM  Type and screen  Returned

 

 2016 12:00 AM  GLUCOSE TOLERANCE TEST (GTT)  Returned

 

 2016 12:00 AM  COMPLETE CBC W/AUTO DIFF WBC  Returned

 

 2012 12:00 AM  X-RAY EXAM KNEE 4 OR MORE  Returned

 

 2016 12:00 AM  TDAP VACCINE 7 YRS/> IM  Reviewed

 

 2016 12:00 AM  IMMUNIZATION ADMIN  Reviewed

 

 2016 12:00 AM  CULTURE SCREEN ONLY  Returned

 

 2016 12:00 AM  OB US LIMITED FETUS(S)  Returned

 

 4/15/2010 12:00 AM  OBSTETRIC PANEL  Reviewed

 

 4/15/2010 12:00 AM  HIV-1ANTIBODY  Reviewed

 

 4/15/2010 12:00 AM  URINALYSIS NONAUTO W/SCOPE  Reviewed

 

 4/15/2010 12:00 AM  OB US < 14 WKS SINGLE FETUS  Reviewed

 

 2010 12:00 AM  GLUCOSE TEST  Reviewed

 

 2010 12:00 AM  Rhogam  Reviewed

 

 2010 12:00 AM  Type and screen  Reviewed

 

 2010 12:00 AM  COMPLETE CBC W/AUTO DIFF WBC  Reviewed

 

 2010 12:00 AM  COMPLETE CBC W/AUTO DIFF WBC  Reviewed

 

 10/14/2010 12:00 AM  CULTURE OTHR SPECIMN AEROBIC  Reviewed

 

 12/15/2010 12:00 AM  INSERTION IMPLANON, IMPLANTABLE CONTRACEPTIVE CAPSULES  
Reviewed

 

 12/15/2010 12:00 AM  Etonogestrel (contraceptive) implant system, (Implanon)  
Reviewed

 

 7/15/2011 12:00 AM  CYTOPATH TBS C/V MANUAL  Reviewed

 

 7/15/2011 12:00 AM  CHLAMYDIA CULTURE  Reviewed

 

 7/15/2011 12:00 AM  N.GONORRHOEAE DNA AMP PROB  Reviewed

 

 7/15/2011 12:00 AM  URINE PREGNANCY TEST  Reviewed

 

 2011 12:00 AM  IMMUNIZATION ADMIN  Reviewed

 

 2011 12:00 AM  HPV VACCINE 4 VALENT IM  Reviewed







Results Summary







 Data and Description  Results

 

 2010 10:41 AM  RUBELLA 34.0 IU/mLCOLOR YELLOW APPEARANCE HAZY SPEC GRAV 
1.025 pH 7.0 PROTEIN TRACE GLUCOSE NEGATIVE KETONE 15 BILIRUBIN NEGATIVE BLOOD 
NEGATIVE NITRITE NEGATIVE LEUK SCREEN NEGATIVE CASTS/LPF NEGATIVE CRYSTALS 
TRACE AMORPH MUCOUS THRDS FEW BACTERIA NEGATIVE EPITH CELLS FEW SQUAMOUS 
TRICHOMONAS NEGATIVE YEAST NEGATIVE WBC 6.3 RBC 4.57 HGB 13.90 g/dLHCT 40.30 %
MCV 88.0 fLMCH 30.40 pgMCHC 34.50 g/dLMPV 9.20 fLPLT 258 %NEUT 65.10 %%LYMP 
28.30 %#NEUT 4.10 #LYMP 1.80 

 

 2010 3:48 PM  WBC 12.9 RBC 4.02 HGB 12.60 g/dLHCT 37.40 %MCV 93.0 fLMCH 
31.30 pgMCHC 33.70 g/dLRDW CV 14.10 %MPV 9.50 fLPLT 263 %NEUT 69.20 %%LYMP 
22.30 %%MONO 7.60 %%EOS 0.70 %%BASO 0.20 %#NEUT 8.91 #LYMP 2.88 #MONO 0.98 #EOS 
0.09 #BASO 0.03 

 

 10/22/2010 8:21 PM  COLOR YELLOW APPEARANCE CLEAR SPEC GRAV 1.010 pH 6.5 
PROTEIN NEGATIVE GLUCOSE NEGATIVE KETONE 15 BILIRUBIN NEGATIVE BLOOD NEGATIVE 
NITRITE NEGATIVE LEUK SCREEN SMALL CASTS/LPF NEGATIVE CRYSTALS NEGATIVE MUCOUS 
THRDS NEGATIVE BACTERIA 2++ EPITH CELLS 3+++ SQUAMOUS TRICHOMONAS NEGATIVE 
YEAST NEGATIVE 

 

 2010 11:58 PM  COLOR YELLOW APPEARANCE CLEAR SPEC GRAV 1.015 pH 6.5 
PROTEIN NEGATIVE GLUCOSE NEGATIVE KETONE NEGATIVE BILIRUBIN NEGATIVE BLOOD 
NEGATIVE NITRITE NEGATIVE LEUK SCREEN LARGE CASTS/LPF NEGATIVE CRYSTALS 
NEGATIVE MUCOUS THRDS NEGATIVE BACTERIA 2++ EPITH CELLS 2++ SQUAMOUS 
TRICHOMONAS NEGATIVE YEAST NEGATIVE 

 

 11/15/2010 5:30 PM  .0 IU/LURIC ACID 3.7 mg/dLGLUCOSE 66.0 mg/dLSODIUM 
137.0 mmol/LPOTASSIUM 3.10 mmol/LCHLORIDE 105.0 mmol/LCO2 21.0 mmol/LBUN 3.0 mg/
dLCREATININE 0.50 mg/dLSGOT/AST 20.0 IU/LSGPT/ALT 18.0 IU/LALK PHOS 152.0 IU/
LTOTAL PROTEIN 6.20 g/dLALBUMIN 3.40 g/dLTOTAL BILI 0.60 mg/dLCALCIUM 9.10 mg/
dLeGFR >60 mL/min/1.73 m2WBC 16.9 RBC 4.18 HGB 13.30 g/dLHCT 39.70 %MCV 95.0 
fLMCH 31.80 pgMCHC 33.50 g/dLRDW CV 14.0 %MPV 10.10 fLPLT 268 %NEUT 75.70 %%
LYMP 17.50 %%MONO 6.20 %%EOS 0.40 %%BASO 0.20 %#NEUT 12.82 #LYMP 2.97 #MONO 
1.05 #EOS 0.06 #BASO 0.03 

 

 11/15/2010 8:50 PM  PROTEIN UR 14.0 mg/dL

 

 2016 12:21 PM  Pap Smear Collected 

 

 2016 2:00 PM  WBC 9.8 RBC 4.60 HGB 13.60 g/dLHCT 41.40 %MCV 90.0 fLMCH 
29.60 pgMCHC 32.90 g/dLRDW CV 13.80 %MPV 9.70 fLPLT 271 %NEUT 59.80 %%LYMP 33.0 
%%MONO 6.40 %%EOS 0.60 %%BASO 0.20 %#NEUT 5.83 #LYMP 3.22 #MONO 0.62 #EOS 0.06 #
BASO 0.02 HIV AG/AB COMBO 0.14 RPR Non Reactive Rubella Antibodies, IgG 2.88 
Index

 

 2016 2:08 PM  COLOR YELLOW APPEARANCE CLEAR SPEC GRAV <=1.005 pH 6.0 
PROTEIN NEGATIVE GLUCOSE NEGATIVE mg/dLKETONE NEGATIVE BILIRUBIN NEGATIVE BLOOD 
NEGATIVE NITRITE NEGATIVE LEUK SCREEN TRACE CASTS/LPF NEGATIVE /LPFCRYSTALS 
NEGATIVE MUCOUS THRDS NEGATIVE BACTERIA NEGATIVE EPITH CELLS 3+++ SQUAMOUS /
HPFTRICHOMONAS NEGATIVE YEAST NEGATIVE 

 

 3/28/2016 2:18 PM  AFP Value 0.0240 ug/mLAFP MoM 1.02 hCG Value 59261.0 mIU/
mLhCG MoM 1.77 uE3 Value 0.70 ng/mLuE3 MoM 1.16 MARISA Value 207.520 pg/mLDIA MoM 
1.25 OSBR Risk       1 IN [address] DSR (Second Trimester) 1IN 4435 DSR (By Age)    
1 IN 1003 T18 Risk Not increased T18 (By Age) 1:3907 

 

 2016 3:15 PM  WBC 11.5 RBC 3.75 HGB 11.70 g/dLHCT 35.50 %MCV 95.0 fLMCH 
31.20 pgMCHC 33.0 g/dLRDW CV 14.10 %MPV 9.70 fLPLT 273 %NEUT 73.0 %%LYMP 20.10 %
%MONO 5.70 %%EOS 0.80 %%BASO 0.10 %#NEUT 8.40 #LYMP 2.31 #MONO 0.65 #EOS 0.09 #
BASO 0.01 







History Of Immunizations







 Name  Date Admin  Mfg Name  Mfg Code  Trade Name  Lot#  Route  Inj  Vis Given  
Vis Pub  CVX

 

 HPV  2011  [employer]  MSD  GARDASIL  [id number]  Intramuscular  Left 
Deltoid  2011  999

 

 HPV  2011  [employer]  MSD  GARDASIL  [id number]  Intramuscular  Left 
Deltoid  2011  62

 

 Tdap  2016  [employer]  SKB  BOOSTRIX  [id number]  Intramuscular  Right 
Deltoid  2016  115







History of Past Illness







 Name  Date of Onset  Comments

 

 Atrial Septal Defect, Ostium Secundum Type      

 

 Prenatal Care, High Risk Pregnancy  2010   

 

    Apr 15 2010  4:29PM   

 

 Prenatal Care, High Risk Pregnancy  Apr 15 2010  4:13PM   

 

    2010  9:54AM   

 

 Prenatal Care, High Risk Pregnancy  2010 10:10AM   

 

    May 13 2010  9:01AM   

 

    John 10 2010  2:45PM   

 

    2010  4:16PM   

 

 Pregnancy, First Normal  Aug 30 2010  9:14AM   

 

 Pregnancy, High Risk  Sep 13 2010  3:13PM   

 

 Group B Strep Screening,   Oct 14 2010  5:43PM   

 

 Pregnancy, High Risk  Oct 14 2010  5:43PM   

 

 Postoperative Examination Following Surgery  Dec  6 2010  9:15AM   

 

 IMPLANON  Insertion  Dec 15 2010 10:44AM   

 

 General Medical Exam, Child  2011  8:58AM   

 

 Contraceptive Management  2011  8:58AM   

 

 Routine gynecological examination  Jul 15 2011 10:03AM   

 

 Contraceptive Counseling  Jul 15 2011 10:03AM   

 

 Vaginal Discharge  Jul 15 2011 10:03AM   

 

 Irregular Menses  Jul 15 2011 10:03AM   

 

 Gardsil (HPV)  2011  8:54AM   

 

 Irregular Menses  Aug 31 2011  9:07AM   

 

 Family Planning  Aug 31 2011  9:07AM   

 

 Contraceptive Management  Andreas  3 2012  9:43AM   

 

 Local Infection  Andreas  3 2012  9:43AM   

 

 Well Child Examination  2012  1:46PM   

 

 Right knee pain  2012  1:46PM   

 

 Right knee pain  2012  1:24PM   

 

 Abscess  2012  1:24PM   

 

 Right knee pain  Mar 26 2012 10:32AM   

 

 General Medical Exam, Adult  2012  1:46PM   

 

 Pregnancy test confirmed positive  2016 11:37AM   

 

 Obesity  2016 11:37AM   

 

 Normal pregnancy in multigravida in second trimester  Mar 28 2016  2:01PM   

 

 History of atrial septal defect repair  May 11 2016  8:50AM   

 

 Normal pregnancy in multigravida in second trimester  2016  2:00PM   

 

 Need for Tdap vaccine  2016  1:38PM   

 

 Group B Strep Screening,   Aug 23 2016  3:43PM   

 

 Normal pregnancy in multigravida in third trimester  Aug 23 2016  3:43PM   

 

 Small for gestational age fetus affecting management of mother in freeman 
pregnancy in third trimester  Sep  7 2016  4:50PM   







Payers







 Insurance Name  Company Name  Plan Name  Plan Number  Policy Number  Policy 
Group Number  Start Date

 

    [facility] Plan [facility] Comm  
   [id number]     N/A

 

    [place] [employer]  [place] [employer]     
[id number]     N/A

 

    zzzTest Medicare A  Test Medicare A     void     N/A







History of Encounters







 Visit Date  Visit Type  Provider

 

 2016  Office visit  Megha Piedra DO

 

 2016  Office visit  Dr. Antonella Ventura MD

 

 2016  Office visit  Dr. Antonella Ventura MD

 

 2016  Office visit  Dr. Antonella Ventura MD

 

 2016  Office visit  Dr. Antonella Ventura MD

 

 2016  Office visit  Dr. Antonella Ventura MD

 

 2016  Office visit  Dr. Antonella Ventura MD

 

 3/28/2016  Office visit  Dr. Antonella Ventura MD

 

 2016  Office visit  Dr. Antonella Ventura MD

 

 2016  Office visit  Dr. Antonella Ventura MD

 

 2016  Office visit  Velma Mendieta APRN

 

 3/26/2012  Office visit  Ivanna Swan APRN

 

 2012  Office visit  Ivanna Swan APRN

 

 2012  Office visit  Ivanna Swan APRN

 

 1/3/2012  Office visit  Ivanna Walker APRN

 

 2011  Nurse visit  Ivanna Walker APRN

 

 2011  Nurse visit  Ivanna Walker APRN

 

 7/15/2011  Office visit  Ivanna Swan APRN

 

 2011  Office visit  Ivanna Swan APRN

 

 12/15/2010  Office visit  Willard Sorensen MD

 

 2010  Surgery  Willard Sorensen MD

 

 2010  [facility]  Willard Sorensen MD

 

 11/15/2010  Office visit  Willard Sorensen MD

 

 11/15/2010  [facility]  Willard Sorensen MD

 

 2010  Office visit  Willard Sorensen MD

 

 2010  Office visit  Willard Sorensen MD

 

 10/28/2010  Office visit  Willard Sorensen MD

 

 10/21/2010  Office visit  Willard Sorensen MD

 

 10/14/2010  Office visit  Willard Sorensen MD

 

 10/9/2010  [facility]  Willard Sorensen MD

 

 10/7/2010  Office visit  Willard Sorensen MD

 

 2010  Office visit  Willard Sorensen MD

 

 2010  Office visit  Willard Sorensen MD

 

 2010  Office visit  Willard Sorensen MD

 

 2010  Office visit  Willard Sorensen MD

 

 2010  Office visit  Willard Sorensen MD

 

 6/10/2010  Office visit  Willard Sorensen MD

 

 2010  Office visit  Willard Sorensen MD

 

 2010  Office visit  Willard Sorensen MD

 

 4/15/2010  Office visit  Willard Sorensen MD

## 2018-11-29 NOTE — XMS REPORT
MU2 Ambulatory Summary

 Created on: 2016



Radha Aviles

External Reference #: [id number]

: 1990

Sex: Female



Demographics







 Address  [facility]

[place]  [address]

 

 Home Phone  [phone]

 

 Preferred Language  English

 

 Marital Status  Never 

 

 Jainism Affiliation  Unknown

 

 Race  White

 

 Ethnic Group  Not  or 





Author







 Author  Antonella Ventura

 

 [facility]

 

 Address  [address]

[place], [place]  [id number]



 

 Phone  [phone]







Care Team Providers







 Care Team Member Name  Role  Phone

 

 Antonella Ventura  PCP  Unavailable







Allergies and Adverse Reactions







 Name  Reaction  Notes

 

 NO KNOWN DRUG ALLERGIES      







Plan of Treatment

Not available.



Medications







  

 

 Name  Start Date  Expiration Date  SIG  Comments

 

 N[place] 60 mg iron-1 mg oral tablet  4/15/2010  3/11/2011  take 1 tablet by 
oral route once daily for 30 days   

 

 Diflucan 150 mg oral tablet  2010  take 1 tablet (150 mg) by 
oral route once  for 1 days   

 

 Premarin 1.25 mg oral tablet  2011  take 1 tablet by oral route 
daily for 10 days   

 

 doxycycline hyclate 100 mg oral tablet  7/15/2011  2011  take 1 tablet (
100 mg) by oral route every 12 hours for 10 days   

 

 ibuprofen 800 mg oral tablet  2012  take 1 tablet by oral route 
3 times a day for 30 days   

 

 Bactrim -160 mg oral tablet  2012  take 1 tablet by oral 
route every 12 hours for 7 days   

 

 Macrobid 100 mg oral capsule  2016  take 1 capsule (100 mg) by 
oral route 2 times per day with food for 7 days   









 Discontinued 

 

 Name  Start Date  Discontinued Date  SIG  Comments

 

 atenolol 25 mg oral tablet     2011  take 1/2 tablet by oral route QD   

 

 Medrol (Antwan) 4 mg oral tablets,dose pack  2012  3/26/2012  take as 
directed   







Problem List







 Description  Status  Onset

 

 Prenatal Care, High Risk Pregnancy  Active  2010







Vital Signs







 Date  Time  BP-Sys(mm[Hg]  BP-Marisa(mm[Hg])  HR(bpm)  RR(rpm)  Temp  WT  HT  HC  
BMI  BSA  BMI Percentile  O2 Sat(%)

 

 2016  11:15:00 AM  130 mmHg  70 mmHg  80 bpm  20 rpm     214 lbs  64 in  
   36.73 kg/m2  2.09 m2      

 

 3/26/2012  10:30:00 AM  126 mmHg  62 mmHg  106 bpm  18 rpm  96.8 F  205 lbs  
64 in     35.1878 kg/m  2.0491 m     97 %

 

 2012  1:22:00 PM  120 mmHg  68 mmHg  66 bpm  18 rpm  97 F  202.375 lbs  
64 in     34.74 kg/m2  2.04 m2      

 

 2012  1:44:00 PM  122 mmHg  64 mmHg  68 bpm  18 rpm  98.2 F  198.125 lbs  
64 in     34.0077 kg/m  2.0145 m      

 

 1/3/2012  9:40:00 AM  103 mmHg  77 mmHg  64 bpm  18 rpm  97.7 F  197.125 lbs  
64 in     33.84 kg/m2  2.01 m2      

 

 2011  9:06:00 AM  118 mmHg  68 mmHg  74 bpm     97.4 F  189 lbs          
        

 

 7/15/2011  10:06:00 AM  126 mmHg  70 mmHg  73 bpm  20 rpm  97.9 F  180.5 lbs  
64 in     30.98 kg/m2  1.92 m2  0 %  99 %

 

 2011  8:56:00 AM  96 mmHg  68 mmHg  66 bpm  20 rpm  97.5 F  177.5 lbs  64 
in     30.4675 kg/m  1.9067 m  0 %  98 %

 

 12/15/2010  10:43:00 AM  104 mmHg  64 mmHg  48 bpm     97.8 F  150 lbs        
          

 

 2010  9:15:00 AM  112 mmHg  72 mmHg  49 bpm     98 F  149 lbs  64 in     
25.5755 kg/m  1.747 m  81.5 %   

 

 2010  8:54:00 AM  117 mmHg  65 mmHg  73 bpm  20 rpm  97.8 F  160.5 lbs   
               

 

 2010  9:42:00 AM  134 mmHg  66 mmHg  66 bpm        161 lbs               
   

 

 4/15/2010  4:02:00 PM  135 mmHg  71 mmHg  77 bpm        162 lbs  64 in     
27.81 kg/m2  1.82 m2  89.9 %   







Social History







 Name  Description  Comments

 

 History of tobacco usage     Quit smoking in January - was smoking 1-2cig/day

 

 denies alcohol use      

 

 Tobacco  Former smoker   







History of Procedures







 Date Ordered  Description  Order Status

 

 2011 12:00 AM  URINE PREGNANCY TEST  Reviewed

 

 2011 12:00 AM  IMMUNIZATION ADMIN  Reviewed

 

 2011 12:00 AM  HPV VACCINE 4 VALENT IM  Reviewed

 

 2016 12:00 AM  CYTOPATH C/V THIN LAYER  Returned

 

 2016 12:00 AM  SPECIMEN HANDLING OFFICE-LAB  Reviewed

 

 2016 12:00 AM  N.GONORRHOEAE DNA AMP PROB  Returned

 

 2016 12:00 AM  CHLAMYDIA CULTURE  Returned

 

 2016 12:00 AM  HIV-1ANTIBODY  Returned

 

 2016 12:00 AM  URINALYSIS AUTO W/SCOPE  Returned

 

 2016 12:00 AM  OBSTETRIC PANEL  Returned

 

 2016 12:00 AM  GLUCOSE TOLERANCE TEST (GTT)  Returned

 

 2012 12:00 AM  X-RAY EXAM KNEE 4 OR MORE  Returned

 

 4/15/2010 12:00 AM  OBSTETRIC PANEL  Reviewed

 

 4/15/2010 12:00 AM  HIV-1ANTIBODY  Reviewed

 

 4/15/2010 12:00 AM  URINALYSIS NONAUTO W/SCOPE  Reviewed

 

 4/15/2010 12:00 AM  OB US < 14 WKS SINGLE FETUS  Reviewed

 

 2010 12:00 AM  GLUCOSE TEST  Reviewed

 

 2010 12:00 AM  Rhogam  Reviewed

 

 2010 12:00 AM  Type and screen  Reviewed

 

 2010 12:00 AM  COMPLETE CBC W/AUTO DIFF WBC  Reviewed

 

 2010 12:00 AM  COMPLETE CBC W/AUTO DIFF WBC  Reviewed

 

 10/14/2010 12:00 AM  CULTURE OTHR SPECIMN AEROBIC  Reviewed

 

 12/15/2010 12:00 AM  INSERTION IMPLANON, IMPLANTABLE CONTRACEPTIVE CAPSULES  
Reviewed

 

 12/15/2010 12:00 AM  Etonogestrel (contraceptive) implant system, (Implanon)  
Reviewed

 

 7/15/2011 12:00 AM  CYTOPATH TBS C/V MANUAL  Reviewed

 

 7/15/2011 12:00 AM  CHLAMYDIA CULTURE  Reviewed

 

 7/15/2011 12:00 AM  N.GONORRHOEAE DNA AMP PROB  Reviewed

 

 7/15/2011 12:00 AM  URINE PREGNANCY TEST  Reviewed

 

 2011 12:00 AM  IMMUNIZATION ADMIN  Reviewed

 

 2011 12:00 AM  HPV VACCINE 4 VALENT IM  Reviewed







Results Summary







 Data and Description  Results

 

 2010 10:41 AM  RUBELLA 34.0 IU/mLCOLOR YELLOW APPEARANCE HAZY SPEC GRAV 
1.025 pH 7.0 PROTEIN TRACE GLUCOSE NEGATIVE KETONE 15 BILIRUBIN NEGATIVE BLOOD 
NEGATIVE NITRITE NEGATIVE LEUK SCREEN NEGATIVE CASTS/LPF NEGATIVE CRYSTALS 
TRACE AMORPH MUCOUS THRDS FEW BACTERIA NEGATIVE EPITH CELLS FEW SQUAMOUS 
TRICHOMONAS NEGATIVE YEAST NEGATIVE WBC 6.3 RBC 4.57 HGB 13.90 g/dLHCT 40.30 %
MCV 88.0 fLMCH 30.40 pgMCHC 34.50 g/dLMPV 9.20 fLPLT 258 %NEUT 65.10 %%LYMP 
28.30 %#NEUT 4.10 #LYMP 1.80 

 

 2010 3:48 PM  WBC 12.9 RBC 4.02 HGB 12.60 g/dLHCT 37.40 %MCV 93.0 fLMCH 
31.30 pgMCHC 33.70 g/dLRDW CV 14.10 %MPV 9.50 fLPLT 263 %NEUT 69.20 %%LYMP 
22.30 %%MONO 7.60 %%EOS 0.70 %%BASO 0.20 %#NEUT 8.91 #LYMP 2.88 #MONO 0.98 #EOS 
0.09 #BASO 0.03 

 

 10/22/2010 8:21 PM  COLOR YELLOW APPEARANCE CLEAR SPEC GRAV 1.010 pH 6.5 
PROTEIN NEGATIVE GLUCOSE NEGATIVE KETONE 15 BILIRUBIN NEGATIVE BLOOD NEGATIVE 
NITRITE NEGATIVE LEUK SCREEN SMALL CASTS/LPF NEGATIVE CRYSTALS NEGATIVE MUCOUS 
THRDS NEGATIVE BACTERIA 2++ EPITH CELLS 3+++ SQUAMOUS TRICHOMONAS NEGATIVE 
YEAST NEGATIVE 

 

 2010 11:58 PM  COLOR YELLOW APPEARANCE CLEAR SPEC GRAV 1.015 pH 6.5 
PROTEIN NEGATIVE GLUCOSE NEGATIVE KETONE NEGATIVE BILIRUBIN NEGATIVE BLOOD 
NEGATIVE NITRITE NEGATIVE LEUK SCREEN LARGE CASTS/LPF NEGATIVE CRYSTALS 
NEGATIVE MUCOUS THRDS NEGATIVE BACTERIA 2++ EPITH CELLS 2++ SQUAMOUS 
TRICHOMONAS NEGATIVE YEAST NEGATIVE 

 

 11/15/2010 5:30 PM  .0 IU/LURIC ACID 3.7 mg/dLGLUCOSE 66.0 mg/dLSODIUM 
137.0 mmol/LPOTASSIUM 3.10 mmol/LCHLORIDE 105.0 mmol/LCO2 21.0 mmol/LBUN 3.0 mg/
dLCREATININE 0.50 mg/dLSGOT/AST 20.0 IU/LSGPT/ALT 18.0 IU/LALK PHOS 152.0 IU/
LTOTAL PROTEIN 6.20 g/dLALBUMIN 3.40 g/dLTOTAL BILI 0.60 mg/dLCALCIUM 9.10 mg/
dLeGFR >60 mL/min/1.73 m2WBC 16.9 RBC 4.18 HGB 13.30 g/dLHCT 39.70 %MCV 95.0 
fLMCH 31.80 pgMCHC 33.50 g/dLRDW CV 14.0 %MPV 10.10 fLPLT 268 %NEUT 75.70 %%
LYMP 17.50 %%MONO 6.20 %%EOS 0.40 %%BASO 0.20 %#NEUT 12.82 #LYMP 2.97 #MONO 
1.05 #EOS 0.06 #BASO 0.03 

 

 11/15/2010 8:50 PM  PROTEIN UR 14.0 mg/dL

 

 2016 12:21 PM  Pap Smear Collected 

 

 2016 2:00 PM  WBC 9.8 RBC 4.60 HGB 13.60 g/dLHCT 41.40 %MCV 90.0 fLMCH 
29.60 pgMCHC 32.90 g/dLRDW CV 13.80 %MPV 9.70 fLPLT 271 %NEUT 59.80 %%LYMP 33.0 
%%MONO 6.40 %%EOS 0.60 %%BASO 0.20 %#NEUT 5.83 #LYMP 3.22 #MONO 0.62 #EOS 0.06 #
BASO 0.02 HIV AG/AB COMBO 0.14 RPR Non Reactive Rubella Antibodies, IgG 2.88 
Index

 

 2016 2:08 PM  COLOR YELLOW APPEARANCE CLEAR SPEC GRAV <=1.005 pH 6.0 
PROTEIN NEGATIVE GLUCOSE NEGATIVE mg/dLKETONE NEGATIVE BILIRUBIN NEGATIVE BLOOD 
NEGATIVE NITRITE NEGATIVE LEUK SCREEN TRACE CASTS/LPF NEGATIVE /LPFCRYSTALS 
NEGATIVE MUCOUS THRDS NEGATIVE BACTERIA NEGATIVE EPITH CELLS 3+++ SQUAMOUS /
HPFTRICHOMONAS NEGATIVE YEAST NEGATIVE 







History Of Immunizations







 Name  Date Admin  Mfg Name  Mfg Code  Trade Name  Lot#  Route  Inj  Vis Given  
Vis Pub  CVX

 

 HPV  2011  [employer]  MSD  GARDASIL  [id number]  Intramuscular  Left 
Deltoid  2011  999

 

 HPV  2011  [employer]  MSD  GARDASIL  [id number]  Intramuscular  Left 
Deltoid  2011  62







History of Past Illness







 Name  Date of Onset  Comments

 

 Atrial Septal Defect, Ostium Secundum Type      

 

 Prenatal Care, High Risk Pregnancy  2010   

 

    Apr 15 2010  4:29PM   

 

 Prenatal Care, High Risk Pregnancy  Apr 15 2010  4:13PM   

 

    2010  9:54AM   

 

 Prenatal Care, High Risk Pregnancy  2010 10:10AM   

 

    May 13 2010  9:01AM   

 

    John 10 2010  2:45PM   

 

    2010  4:16PM   

 

 Pregnancy, First Normal  Aug 30 2010  9:14AM   

 

 Pregnancy, High Risk  Sep 13 2010  3:13PM   

 

 Group B Strep Screening,   Oct 14 2010  5:43PM   

 

 Pregnancy, High Risk  Oct 14 2010  5:43PM   

 

 Postoperative Examination Following Surgery  Dec  6 2010  9:15AM   

 

 IMPLANON  Insertion  Dec 15 2010 10:44AM   

 

 General Medical Exam, Child  2011  8:58AM   

 

 Contraceptive Management  2011  8:58AM   

 

 Routine gynecological examination  Jul 15 2011 10:03AM   

 

 Contraceptive Counseling  Jul 15 2011 10:03AM   

 

 Vaginal Discharge  Jul 15 2011 10:03AM   

 

 Irregular Menses  Jul 15 2011 10:03AM   

 

 Gardsil (HPV)  2011  8:54AM   

 

 Irregular Menses  Aug 31 2011  9:07AM   

 

 Family Planning  Aug 31 2011  9:07AM   

 

 Contraceptive Management  Andreas  3 2012  9:43AM   

 

 Local Infection  Andreas  3 2012  9:43AM   

 

 Well Child Examination  2012  1:46PM   

 

 Right knee pain  2012  1:46PM   

 

 Right knee pain  2012  1:24PM   

 

 Abscess  2012  1:24PM   

 

 Right knee pain  Mar 26 2012 10:32AM   

 

 General Medical Exam, Adult  2012  1:46PM   

 

 Pregnancy test confirmed positive  2016 11:37AM   

 

 Obesity  2016 11:37AM   







Payers







 Insurance Name  Company Name  Plan Name  Plan Number  Policy Number  Policy 
Group Number  Start Date

 

    [facility]  
   [id number]     N/A

 

    [place] [employer]  [place] [employer]     
[id number]     N/A

 

    [id number] Medicare A  Test Medicare A     void     N/A







History of Encounters







 Visit Date  Visit Type  Provider

 

 3/28/2016  Office visit  Dr. Antonella Ventura MD

 

 2016  Office visit  Dr. Antonella Ventura MD

 

 2016  Office visit  Dr. Antonella Ventura MD

 

 2016  Office visit  Velma Mendieta APRN

 

 3/26/2012  Office visit  Ivanna Swan APRN

 

 2012  Office visit  Ivanna Swan APRN

 

 2012  Office visit  Ivanna Swan APRN

 

 1/3/2012  Office visit  Ivanna Swan APRN

 

 2011  Nurse visit  Ivanna Swan APRN

 

 2011  Nurse visit  Ivanna Swan APRN

 

 7/15/2011  Office visit  Ivanna Swan APRN

 

 2011  Office visit  Ivanna Swan APRN

 

 12/15/2010  Office visit  Willard Sorensen MD

 

 2010  Surgery  Willard Sorensen MD

 

 2010  Hospital  Willard Sorensen MD

 

 11/15/2010  Office visit  Willard Sorensen MD

 

 11/15/2010  [facility]  Willard Sorensen MD

 

 2010  Office visit  Willard Sorensen MD

 

 2010  Office visit  Willard Sorensen MD

 

 10/28/2010  Office visit  Willard Sorensen MD

 

 10/21/2010  Office visit  Willard Sorensen MD

 

 10/14/2010  Office visit  Willard Sorensen MD

 

 10/9/2010  [facility]  Willard Sorensen MD

 

 10/7/2010  Office visit  Willard Sorensen MD

 

 2010  Office visit  Willard Sorensen MD

 

 2010  Office visit  Willard Sorensen MD

 

 2010  Office visit  Willard Sorensen MD

 

 2010  Office visit  Willard Sorensen MD

 

 2010  Office visit  Willard Sorensen MD

 

 6/10/2010  Office visit  Willard Sorensen MD

 

 2010  Office visit  Willard Sorensen MD

 

 2010  Office visit  Willard Sorensen MD

 

 4/15/2010  Office visit  Willard Sorensen MD

## 2018-11-29 NOTE — XMS REPORT
MU2 Ambulatory Summary

 Created on: 2016



Radha Aviles

External Reference #: [id number]

: 1990

Sex: Female



Demographics







 Address  [address] [place] [address] [address]

[place]  [address]

 

 Home Phone  [phone]

 

 Preferred Language  English

 

 Marital Status  Never 

 

 [employer] Affiliation  Unknown

 

 Race  White

 

 Ethnic Group  Not  or 





Author







 Author  Antonella Ventura  [facility]

 

 Address  [address]

[place], [place]  [id number]



 

 Phone  [phone]







Care Team Providers







 Care Team Member Name  Role  Phone

 

 Antonella Ventura  PCP  Unavailable







Allergies and Adverse Reactions







 Name  Reaction  Notes

 

 NO KNOWN DRUG ALLERGIES      







Plan of Treatment







 Planned Activity  Comments  Planned Date  Planned Time  Plan/Goal

 

 RH IG FULL-DOSE IM     2016  12:00 AM   

 

 GLUCOSE TOLERANCE TEST (GTT)     2016  12:00 AM   

 

 COMPLETE CBC W/AUTO DIFF WBC     2016  12:00 AM   







Medications







  

 

 Name  Start Date  Expiration Date  SIG  Comments

 

 NataFort 60 mg iron-1 mg oral tablet  4/15/2010  3/11/2011  take 1 tablet by 
oral route once daily for 30 days   

 

 Diflucan 150 mg oral tablet  2010  take 1 tablet (150 mg) by 
oral route once  for 1 days   

 

 Premarin 1.25 mg oral tablet  2011  take 1 tablet by oral route 
daily for 10 days   

 

 doxycycline hyclate 100 mg oral tablet  7/15/2011  2011  take 1 tablet (
100 mg) by oral route every 12 hours for 10 days   

 

 ibuprofen 800 mg oral tablet  2012  take 1 tablet by oral route 
3 times a day for 30 days   

 

 Bactrim -160 mg oral tablet  2012  take 1 tablet by oral 
route every 12 hours for 7 days   

 

 Macrobid 100 mg oral capsule  2016  take 1 capsule (100 mg) by 
oral route 2 times per day with food for 7 days   









 Discontinued 

 

 Name  Start Date  Discontinued Date  SIG  Comments

 

 atenolol 25 mg oral tablet     2011  take 1/2 tablet by oral route QD   

 

 Medrol (Antwan) 4 mg oral tablets,dose pack  2012  3/26/2012  take as 
directed   







Problem List







 Description  Status  Onset

 

 Prenatal Care, High Risk Pregnancy  Active  2010







Vital Signs







 Date  Time  BP-Sys(mm[Hg]  BP-Marisa(mm[Hg])  HR(bpm)  RR(rpm)  Temp  WT  HT  HC  
BMI  BSA  BMI Percentile  O2 Sat(%)

 

 2016  11:15:00 AM  130 mmHg  70 mmHg  80 bpm  20 rpm     214 lbs  64 in  
   36.73 kg/m2  2.09 m2      

 

 3/26/2012  10:30:00 AM  126 mmHg  62 mmHg  106 bpm  18 rpm  96.8 F  205 lbs  
64 in     35.1878 kg/m  2.0491 m     97 %

 

 2012  1:22:00 PM  120 mmHg  68 mmHg  66 bpm  18 rpm  97 F  202.375 lbs  
64 in     34.74 kg/m2  2.04 m2      

 

 2012  1:44:00 PM  122 mmHg  64 mmHg  68 bpm  18 rpm  98.2 F  198.125 lbs  
64 in     34.0077 kg/m  2.0145 m      

 

 1/3/2012  9:40:00 AM  103 mmHg  77 mmHg  64 bpm  18 rpm  97.7 F  197.125 lbs  
64 in     33.84 kg/m2  2.01 m2      

 

 2011  9:06:00 AM  118 mmHg  68 mmHg  74 bpm     97.4 F  189 lbs          
        

 

 7/15/2011  10:06:00 AM  126 mmHg  70 mmHg  73 bpm  20 rpm  97.9 F  180.5 lbs  
64 in     30.98 kg/m2  1.92 m2  0 %  99 %

 

 2011  8:56:00 AM  96 mmHg  68 mmHg  66 bpm  20 rpm  97.5 F  177.5 lbs  64 
in     30.4675 kg/m  1.9067 m  0 %  98 %

 

 12/15/2010  10:43:00 AM  104 mmHg  64 mmHg  48 bpm     97.8 F  150 lbs        
          

 

 2010  9:15:00 AM  112 mmHg  72 mmHg  49 bpm     98 F  149 lbs  64 in     
25.5755 kg/m  1.747 m  81.5 %   

 

 2010  8:54:00 AM  117 mmHg  65 mmHg  73 bpm  20 rpm  97.8 F  160.5 lbs   
               

 

 2010  9:42:00 AM  134 mmHg  66 mmHg  66 bpm        161 lbs               
   

 

 4/15/2010  4:02:00 PM  135 mmHg  71 mmHg  77 bpm        162 lbs  64 in     
27.81 kg/m2  1.82 m2  89.9 %   







Social History







 Name  Description  Comments

 

 History of tobacco usage     Quit smoking in January - was smoking 1-2cig/day

 

 denies alcohol use      

 

 Tobacco  Former smoker   







History of Procedures







 Date Ordered  Description  Order Status

 

 2011 12:00 AM  URINE PREGNANCY TEST  Reviewed

 

 2011 12:00 AM  IMMUNIZATION ADMIN  Reviewed

 

 2011 12:00 AM  HPV VACCINE 4 VALENT IM  Reviewed

 

 2016 12:00 AM  CYTOPATH C/V THIN LAYER  Returned

 

 2016 12:00 AM  SPECIMEN HANDLING OFFICE-LAB  Reviewed

 

 2016 12:00 AM  N.GONORRHOEAE DNA AMP PROB  Returned

 

 2016 12:00 AM  CHLAMYDIA CULTURE  Returned

 

 2016 12:00 AM  HIV-1ANTIBODY  Returned

 

 2016 12:00 AM  URINALYSIS AUTO W/SCOPE  Returned

 

 2016 12:00 AM  OBSTETRIC PANEL  Returned

 

 2016 12:00 AM  GLUCOSE TOLERANCE TEST (GTT)  Returned

 

 3/28/2016 12:00 AM  ALPHA-FETOPROTEIN SERUM  Returned

 

 3/28/2016 12:00 AM  CHORIONIC GONADOTROPIN TEST  Returned

 

 3/28/2016 12:00 AM  CHORIONIC GONADOTROPIN ASSAY  Returned

 

 5/3/2016 12:00 AM  OB US >/=14 WKS SNGL FETUS  Returned

 

 2012 12:00 AM  X-RAY EXAM KNEE 4 OR MORE  Returned

 

 4/15/2010 12:00 AM  OBSTETRIC PANEL  Reviewed

 

 4/15/2010 12:00 AM  HIV-1ANTIBODY  Reviewed

 

 4/15/2010 12:00 AM  URINALYSIS NONAUTO W/SCOPE  Reviewed

 

 4/15/2010 12:00 AM  OB US < 14 WKS SINGLE FETUS  Reviewed

 

 2010 12:00 AM  GLUCOSE TEST  Reviewed

 

 2010 12:00 AM  Rhogam  Reviewed

 

 2010 12:00 AM  Type and screen  Reviewed

 

 2010 12:00 AM  COMPLETE CBC W/AUTO DIFF WBC  Reviewed

 

 2010 12:00 AM  COMPLETE CBC W/AUTO DIFF WBC  Reviewed

 

 10/14/2010 12:00 AM  CULTURE OTHR SPECIMN AEROBIC  Reviewed

 

 12/15/2010 12:00 AM  INSERTION IMPLANON, IMPLANTABLE CONTRACEPTIVE CAPSULES  
Reviewed

 

 12/15/2010 12:00 AM  Etonogestrel (contraceptive) implant system, (Implanon)  
Reviewed

 

 7/15/2011 12:00 AM  CYTOPATH TBS C/V MANUAL  Reviewed

 

 7/15/2011 12:00 AM  CHLAMYDIA CULTURE  Reviewed

 

 7/15/2011 12:00 AM  N.GONORRHOEAE DNA AMP PROB  Reviewed

 

 7/15/2011 12:00 AM  URINE PREGNANCY TEST  Reviewed

 

 2011 12:00 AM  IMMUNIZATION ADMIN  Reviewed

 

 2011 12:00 AM  HPV VACCINE 4 VALENT IM  Reviewed







Results Summary







 Data and Description  Results

 

 2010 10:41 AM  RUBELLA 34.0 IU/mLCOLOR YELLOW APPEARANCE HAZY SPEC GRAV 
1.025 pH 7.0 PROTEIN TRACE GLUCOSE NEGATIVE KETONE 15 BILIRUBIN NEGATIVE BLOOD 
NEGATIVE NITRITE NEGATIVE LEUK SCREEN NEGATIVE CASTS/LPF NEGATIVE CRYSTALS 
TRACE AMORPH MUCOUS THRDS FEW BACTERIA NEGATIVE EPITH CELLS FEW SQUAMOUS 
TRICHOMONAS NEGATIVE YEAST NEGATIVE WBC 6.3 RBC 4.57 HGB 13.90 g/dLHCT 40.30 %
MCV 88.0 fLMCH 30.40 pgMCHC 34.50 g/dLMPV 9.20 fLPLT 258 %NEUT 65.10 %%LYMP 
28.30 %#NEUT 4.10 #LYMP 1.80 

 

 2010 3:48 PM  WBC 12.9 RBC 4.02 HGB 12.60 g/dLHCT 37.40 %MCV 93.0 fLMCH 
31.30 pgMCHC 33.70 g/dLRDW CV 14.10 %MPV 9.50 fLPLT 263 %NEUT 69.20 %%LYMP 
22.30 %%MONO 7.60 %%EOS 0.70 %%BASO 0.20 %#NEUT 8.91 #LYMP 2.88 #MONO 0.98 #EOS 
0.09 #BASO 0.03 

 

 10/22/2010 8:21 PM  COLOR YELLOW APPEARANCE CLEAR SPEC GRAV 1.010 pH 6.5 
PROTEIN NEGATIVE GLUCOSE NEGATIVE KETONE 15 BILIRUBIN NEGATIVE BLOOD NEGATIVE 
NITRITE NEGATIVE LEUK SCREEN SMALL CASTS/LPF NEGATIVE CRYSTALS NEGATIVE MUCOUS 
THRDS NEGATIVE BACTERIA 2++ EPITH CELLS 3+++ SQUAMOUS TRICHOMONAS NEGATIVE 
YEAST NEGATIVE 

 

 2010 11:58 PM  COLOR YELLOW APPEARANCE CLEAR SPEC GRAV 1.015 pH 6.5 
PROTEIN NEGATIVE GLUCOSE NEGATIVE KETONE NEGATIVE BILIRUBIN NEGATIVE BLOOD 
NEGATIVE NITRITE NEGATIVE LEUK SCREEN LARGE CASTS/LPF NEGATIVE CRYSTALS 
NEGATIVE MUCOUS THRDS NEGATIVE BACTERIA 2++ EPITH CELLS 2++ SQUAMOUS 
TRICHOMONAS NEGATIVE YEAST NEGATIVE 

 

 11/15/2010 5:30 PM  .0 IU/LURIC ACID 3.7 mg/dLGLUCOSE 66.0 mg/dLSODIUM 
137.0 mmol/LPOTASSIUM 3.10 mmol/LCHLORIDE 105.0 mmol/LCO2 21.0 mmol/LBUN 3.0 mg/
dLCREATININE 0.50 mg/dLSGOT/AST 20.0 IU/LSGPT/ALT 18.0 IU/LALK PHOS 152.0 IU/
LTOTAL PROTEIN 6.20 g/dLALBUMIN 3.40 g/dLTOTAL BILI 0.60 mg/dLCALCIUM 9.10 mg/
dLeGFR >60 mL/min/1.73 m2WBC 16.9 RBC 4.18 HGB 13.30 g/dLHCT 39.70 %MCV 95.0 
fLMCH 31.80 pgMCHC 33.50 g/dLRDW CV 14.0 %MPV 10.10 fLPLT 268 %NEUT 75.70 %%
LYMP 17.50 %%MONO 6.20 %%EOS 0.40 %%BASO 0.20 %#NEUT 12.82 #LYMP 2.97 #MONO 
1.05 #EOS 0.06 #BASO 0.03 

 

 11/15/2010 8:50 PM  PROTEIN UR 14.0 mg/dL

 

 2016 12:21 PM  Pap Smear Collected 

 

 2016 2:00 PM  WBC 9.8 RBC 4.60 HGB 13.60 g/dLHCT 41.40 %MCV 90.0 fLMCH 
29.60 pgMCHC 32.90 g/dLRDW CV 13.80 %MPV 9.70 fLPLT 271 %NEUT 59.80 %%LYMP 33.0 
%%MONO 6.40 %%EOS 0.60 %%BASO 0.20 %#NEUT 5.83 #LYMP 3.22 #MONO 0.62 #EOS 0.06 #
BASO 0.02 HIV AG/AB COMBO 0.14 RPR Non Reactive Rubella Antibodies, IgG 2.88 
Index

 

 2016 2:08 PM  COLOR YELLOW APPEARANCE CLEAR SPEC GRAV <=1.005 pH 6.0 
PROTEIN NEGATIVE GLUCOSE NEGATIVE mg/dLKETONE NEGATIVE BILIRUBIN NEGATIVE BLOOD 
NEGATIVE NITRITE NEGATIVE LEUK SCREEN TRACE CASTS/LPF NEGATIVE /LPFCRYSTALS 
NEGATIVE MUCOUS THRDS NEGATIVE BACTERIA NEGATIVE EPITH CELLS 3+++ SQUAMOUS /
HPFTRICHOMONAS NEGATIVE YEAST NEGATIVE 

 

 3/28/2016 2:18 PM  AFP Value 0.0240 ug/mLAFP MoM 1.02 hCG Value 00382.0 mIU/
mLhCG MoM 1.77 uE3 Value 0.70 ng/mLuE3 MoM 1.16 MARISA Value 207.520 pg/mLDIA MoM 
1.25 OSBR Risk       1 [place] 24471 DSR (Second Trimester) 1IN 4435 DSR (By Age)    
1 IN 1003 T18 Risk Not increased T18 (By Age) 1:3907 







History Of Immunizations







 Name  Date Admin  Mfg Name  Mfg Code  Trade Name  Lot#  Route  Inj  Vis Given  
Vis Pub  CVX

 

 HPV  2011  [employer]  MSD  GARDASIL  045OZ  Intramuscular  Left 
Deltoid  2011  999

 

 HPV  2011  [employer]  MSD  GARDASIL  16111  Intramuscular  Left 
Deltoid  2011  62







History of Past Illness







 Name  Date of Onset  Comments

 

 Atrial Septal Defect, Ostium Secundum Type      

 

 Prenatal Care, High Risk Pregnancy  2010   

 

    Apr 15 2010  4:29PM   

 

 Prenatal Care, High Risk Pregnancy  Apr 15 2010  4:13PM   

 

    2010  9:54AM   

 

 Prenatal Care, High Risk Pregnancy  2010 10:10AM   

 

    May 13 2010  9:01AM   

 

    John 10 2010  2:45PM   

 

    2010  4:16PM   

 

 Pregnancy, First Normal  Aug 30 2010  9:14AM   

 

 Pregnancy, High Risk  Sep 13 2010  3:13PM   

 

 Group B Strep Screening,   Oct 14 2010  5:43PM   

 

 Pregnancy, High Risk  Oct 14 2010  5:43PM   

 

 Postoperative Examination Following Surgery  Dec  6 2010  9:15AM   

 

 IMPLANON  Insertion  Dec 15 2010 10:44AM   

 

 General Medical Exam, Child  2011  8:58AM   

 

 Contraceptive Management  2011  8:58AM   

 

 Routine gynecological examination  Jul 15 2011 10:03AM   

 

 Contraceptive Counseling  Jul 15 2011 10:03AM   

 

 Vaginal Discharge  Jul 15 2011 10:03AM   

 

 Irregular Menses  Jul 15 2011 10:03AM   

 

 Gardsil (HPV)  2011  8:54AM   

 

 Irregular Menses  Aug 31 2011  9:07AM   

 

 Family Planning  Aug 31 2011  9:07AM   

 

 Contraceptive Management  Andreas  3 2012  9:43AM   

 

 Local Infection  Andreas  3 2012  9:43AM   

 

 Well Child Examination  2012  1:46PM   

 

 Right knee pain  2012  1:46PM   

 

 Right knee pain  2012  1:24PM   

 

 Abscess  2012  1:24PM   

 

 Right knee pain  Mar 26 2012 10:32AM   

 

 General Medical Exam, Adult  2012  1:46PM   

 

 Pregnancy test confirmed positive  2016 11:37AM   

 

 Obesity  2016 11:37AM   

 

 Normal pregnancy in multigravida in second trimester  Mar 28 2016  2:01PM   

 

 History of atrial septal defect repair  May 11 2016  8:50AM   

 

 Normal pregnancy in multigravida in second trimester  2016  2:00PM   







Payers







 Insurance Name  Company Name  Plan Name  Plan Number  Policy Number  Policy 
Group Number  Start Date

 

    [facility] Plan [facility]  
   [id number]     N/A

 

    [place] [employer]  [place] [employer]     
[id number]     N/A

 

    zzzTest Medicare A  Test Medicare A     void     N/A







History of Encounters







 Visit Date  Visit Type  Provider

 

 2016  Office visit  Dr. Antonella Ventura MD

 

 2016  Office visit  Dr. Antonella Ventura MD

 

 3/28/2016  Office visit  Dr. Antonella Ventura MD

 

 2016  Office visit  Dr. Antonella Ventura MD

 

 2016  Office visit  Dr. Antonella Ventura MD

 

 2016  Office visit  Velma Mendieta APRN

 

 3/26/2012  Office visit  Ivanna Swan APRN

 

 2012  Office visit  Ivanna Swan APRN

 

 2012  Office visit  Ivanna Swan APRN

 

 1/3/2012  Office visit  Ivanna Swan APRN

 

 2011  Nurse visit  Ivanna Swan APRN

 

 2011  Nurse visit  Ivanna Swan APRN

 

 7/15/2011  Office visit  Ivanna Swan APRN

 

 2011  Office visit  Ivanna Swan APRN

 

 12/15/2010  Office visit  Willard Sorensen MD

 

 2010  Surgery  Willard Sorensen MD

 

 2010  [facility]  Willard Sorensen MD

 

 11/15/2010  Office visit  Willard Sorensen MD

 

 11/15/2010  [facility]  Willard Sorensen MD

 

 2010  Office visit  Willard Sorensen MD

 

 2010  Office visit  Willard Sorensen MD

 

 10/28/2010  Office visit  Willard Sorensen MD

 

 10/21/2010  Office visit  Willard Sorensen MD

 

 10/14/2010  Office visit  Willard Sorensen MD

 

 10/9/2010  [facility]  Willard Sorensen MD

 

 10/7/2010  Office visit  Willard Sorensen MD

 

 2010  Office visit  Willard Sorensen MD

 

 2010  Office visit  Willard Sorensen MD

 

 2010  Office visit  Willard Sorensen MD

 

 2010  Office visit  Willard Sorensen MD

 

 2010  Office visit  Willard Sorensen MD

 

 6/10/2010  Office visit  Willard Sorensen MD

 

 2010  Office visit  Willard Sorensen MD

 

 2010  Office visit  Willard Sorensen MD

 

 4/15/2010  Office visit  Willard Sorensen MD

## 2018-11-29 NOTE — XMS REPORT
Continuity of Care Document

 Created on: 2015



RADHA GUTIÉRREZ

External Reference #: [id number]

: 1990

Sex: Female



Demographics







 Address   [address]  [address]

 

 Home Phone  [phone]

 

 Preferred Language  Unknown

 

 Marital Status  

 

 Mu-ism Affiliation  Unknown

 

 Race  White

 

 Ethnic Group  Unknown





Author







 Author  [facility]

 

 Organization  [facility]

 

 Address  [facility]

 [address]

[place]  [address]



 

 Phone  Unavailable







Support







 Name  Relationship  Address  Phone

 

 ROMMEL ANN DO  Caregiver  [address]

[place]  [address]  [phone]

 

 JUSTIN GUTIÉRREZSSNILSA LIZAMA  Next Of Kin   [address]  [address] [phone]







Insurance Providers







 Payer Name  Policy Number  Subscriber Name  Relationship

 

 [place]  [id number]  Radha Gutiérrez  18 Self / Same As Patient







Advance Directives







 Directive  Response  Recorded Date/Time

 

 Advance Directives  No  02/20/15 7:18am

 

 Living Will  No  02/20/15 7:18am

 

 Health Care Proxy  No  02/20/15 7:18am

 

 Power of  for Health Care  No  02/20/15 7:18am

 

 Organ, Tissue, or Eye Donor  No  02/20/15 7:17am

 

 Do you have a signed organ donor card?  No  02/20/15 7:17am







Problems

Medical Problems





 Problem  Onset Date  Status

 

 Spontaneous   Unknown  Active

 

 UTI (lower urinary tract infection)  Unknown  Active

 

 Spontaneous   Unknown  Active







Medications







 Medication  Dose  Route  Sig  Days/Qty  Instructions  Order Date  Discontinued 
Date  Status

 

 Folic Acid  1 Mg  PO  DAILY        02/20/15     Active

 

 Pnv With Ca,No.74/Iron/Fa  1 Each  PO           02/20/15     Active

 

 Cephalexin Monohydrate  1,000 Mg  PO  TWICE A DAY  40 Qty     02/20/15     
Active







Social History







 Social History Problem  Response  Recorded Date/Time

 

 Smoking Status  Never smoker  2015 8:06am

 

 Alcohol Use  none  2015 8:06am

 

 Drug Use  none  2015 8:06am









 Query  Response  Start Date  Stop Date

 

 Smoking Status  Never smoker      







Hospital Discharge Instructions

No hospital discharge instructions.



Plan of Care

No plan of care.



Functional Status







 Query  Response  Date Recorded

 

 [place] Coma Scale Total  15

  2015 11:00am

 

 Patient Behavior  Crying

  2015 11:00am







Allergies, Adverse Reactions, Alerts







 Allergen  Type  Severity  Reaction  Status  Last Updated

 

 NO KNOWN ALLERGIES           Active  02/20/15







Immunizations







 Name  Given  Type

 

 Hx Diphtheria, Pertussis, Tetanus Vaccination  within 10yrs  Historical

 

 Hx Influenza Vaccination  Y 2014  Historical

 

 Hx Pneumococcal Vaccination  No  Historical







Vital Signs

Acute Vital Signs





 Vital  Response  Date/Time

 

 Temperature (Fahrenheit)  98.2 degrees F (97.6 - 99.5)   

 

 Temperature Source  Temporal Artery     

 

 Pulse Rate (adult)  98 bpm (60 - 90)   

 

 Respiratory Rate  22 bpm (12 - 24)   

 

 Blood Pressure  108/67 mm Hg   

 

 O2 Sat by Pulse Oximetry  97 % (90 - 100)   

 

 Oxygen Delivery Method      

 

 Pain Intensity  4     

 

 Pain Location Body Site Modifier  Medial     

 

 Pain Description      

 

 Pain Duration  > 6 Hours     

 

 Height  5 ft 4 in    

 

 Weight  191 lb    

 

 Body Mass Index  32.0 kg/m^2    







Results







 Test  Source  Date  Result  Interp.  Ref. Range  Comments

 

 Urine Sperm     2015 8:39am  Few  H  -  Urine obtained via URINE, 
CLEAN CATCH

 

 Urine Bacteria     2015 8:39am  2+  H  -  A CULTURE HAS BEEN 
ADDED TO THIS SPECIMEN PER PROTOCOL

 

 Urine Squamous Epithelial Cells     2015 8:39am  2-4 /hpf  H  -  
Urine obtained via URINE, CLEAN CATCH

 

 Urine WBC     2015 8:39am  5-9 /hpf  H  -  A CULTURE HAS BEEN 
ADDED TO THIS SPECIMEN PER PROTOCOL

 

 Urine RBC     2015 8:39am  15-19 /hpf  H  -  Urine obtained via 
URINE, CLEAN CATCH

 

 Urine Leukocyte Esterase     2015 8:39am  1+ (small)  H  -  Urine 
obtained via URINE, CLEAN CATCH

 

 Urine Nitrate     2015 8:39am  Negative     -  Urine obtained via 
URINE, CLEAN CATCH

 

 Urine Urobilinogen     2015 8:39am  1.0 mg/dl E.U./dL     -  
Urine obtained via URINE, CLEAN CATCH

 

 Urine Protein     2015 8:39am  Negative mg/dL     -  Urine 
obtained via URINE, CLEAN CATCH

 

 Urine pH     2015 8:39am  7.0     -  Urine obtained via URINE, 
CLEAN CATCH

 

 Urine Occult Blood     2015 8:39am  3+ (large)  H  -  URINE 
CULTURE ORDERED PER MEDICAL STAFF-APPROVED PROTOCOLFOR LAB.

 

 Urine Specific Gravity     2015 8:39am  1.020  N  1.010-1.025  
Urine obtained via URINE, CLEAN CATCH

 

 Urine Ketones     2015 8:39am  1+ (15 mg/dl) mg/dL  H  -  Urine 
obtained via URINE, CLEAN CATCH

 

 Urine Bilirubin     2015 8:39am  Negative     -  Urine obtained 
via URINE, CLEAN CATCH

 

 Urine Glucose (UA)     2015 8:39am  Negative mg/dL     -  Urine 
obtained via URINE, CLEAN CATCH

 

 Urine Appearance     2015 8:39am  Clear     -  Urine obtained via 
URINE, CLEAN CATCH

 

 Urine Color     2015 8:39am  Lt. yellow     -  Urine obtained via 
URINE, CLEAN CATCH

 

 Urine HCG, Qualitative     2015 8:39am  Positive     -  Urine 
obtained via URINE, CLEAN CATCH

 

 HCG Beta Subunit     2015 7:55am  1353 IU/L     -  9-130 mIU/ml 
INDICATES 3-4 WEEKS POST LMP75-2,600 mIU/ml INDICATES 4-5 WEEKS POST LMP

 850-20,800 mIU/ml INDICATES 5-6 WEEKS POST LMP

 4,000-100,200 mIU/ml INDICATES 6-7 WEEKS POST LMP

 11,500-289,000 mIU/ml INDICATES 7-12 WEEKS POST LMP

 18,000-137,000 mIU/ml INDICATES 12-16 WEEKS POST LMP

 1,400-53,000 mIU/ml INDICATES 16-29 WEEKS POST LMP

      (2ND TRIMESTER)

 940-60,000 mIU/ML INDICATES 29-41 WEEKS

      (3RD TRIMESTER)

 

 Glomerular Filtration Rate Calc     2015 7:55am  130.5 mL/min  H  
60.0-128.0   

 

 Total Alkaline Phosphatase     2015 7:55am  105 U/L  N     

 

 Alanine Aminotransferase (ALT/SGPT)     2015 7:55am  16 U/L  N  12
-78   

 

 Aspartate Amino Transf (AST/SGOT)     2015 7:55am  13 U/L  L  15-
37   

 

 Total Bilirubin     2015 7:55am  0.70 mg/dL  N  0.00-1.00   

 

 Albumin     2015 7:55am  3.6 gm/dL  N  3.4-5.0   

 

 Total Protein     2015 7:55am  8.0 gm/dL  N  6.4-8.2   

 

 Calcium Level     2015 7:55am  9.0 mg/dL  N  8.8-10.5   

 

 Carbon Dioxide Level     2015 7:55am  24.6 mEq/L  N  21-32   

 

 Chloride Level     2015 7:55am  103 mEq/L  N     

 

 Potassium Level     2015 7:55am  3.5 mEq/L  N  3.5-5.0   

 

 Sodium Level     2015 7:55am  140 mEq/L  N  136-145   

 

 Creatinine     2015 7:55am  0.6 mg/dL  N  0.6-1.0   

 

 Blood Urea Nitrogen     2015 7:55am  5 mg/dL  L  7-18   

 

 Random Glucose     2015 7:55am  80 mg/dL  N     

 

 Basophils # (Auto)     2015 7:55am  0.0 K/uL  N  0.0-0.2   

 

 Eosinophils # (Auto)     2015 7:55am  0.1 K/uL  N  0.0-0.7   

 

 Monocytes # (Auto)     2015 7:55am  0.4 K/uL  N  0.1-0.6   

 

 Lymphocytes # (Auto)     2015 7:55am  1.7 K/uL  N  1.2-3.4   

 

 Neutrophils # (Auto)     2015 7:55am  6.7 K/uL  N  2.0-6.9   

 

 Basophils (%) (Auto)     2015 7:55am  0.5 %  N  0.0-1.0   

 

 Eosinophils (%) (Auto)     2015 7:55am  0.9 %  N  0.0-2.0   

 

 Monocytes (%) (Auto)     2015 7:55am  4.3 %  N  1.7-9.3   

 

 Lymphocytes (%) (Auto)     2015 7:55am  19.3 %  L  20.5-51.1   

 

 Neutrophils (%) (Auto)     2015 7:55am  75.0 %  N  42.2-75.2   

 

 Mean Platelet Volume     2015 7:55am  7.1 fL  L  7.4-10.4   

 

 Platelet Count     2015 7:55am  260 K/uL  N  130-400   

 

 Red Cell Distribution Width     2015 7:55am  12.3 %  N  11.5-14.5
   

 

 Mean Corpuscular Hemoglobin Concent     2015 7:55am  35.3 g/dL  N
  30.0-37.0   

 

 Mean Corpuscular Hemoglobin     2015 7:55am  29.6 pg  N  27.0-
31.0   

 

 Mean Corpuscular Volume     2015 7:55am  84.0 fL  N  81.0-99.0   

 

 Hematocrit     2015 7:55am  40.6 %  N  37.0-47.0   

 

 Hemoglobin     2015 7:55am  14.3 gm/dL  N  12.0-16.0   

 

 Red Blood Count     2015 7:55am  4.83 M/uL  N  4.20-5.40   

 

 White Blood Count     2015 7:55am  8.9 K/uL  N  4.8-10.8   

 

 Wet Prep  Vaginal  2015 9:06am            

 

 Urine Culture  Urine,Clean Catch  2015 8:39am  Pending         







Procedures







 Procedure  Status  Date  Provider(s)

 

 Limited obstetrical ultrasound  completed  02/20/15  ROMMEL ANN DO







Encounters







 Encounter  Location  Date/Time

 

 Departed Emergency Room  [place]  02/20/15 7:21am











 Recent Diagnosis

## 2018-11-29 NOTE — XMS REPORT
MU2 Ambulatory Summary

 Created on: 2016



Radha Aviles

External Reference #: [id number]

: 1990

Sex: Female



Demographics







 Address  [address] [place] [address] [address]

[place]  [address]

 

 Home Phone  [phone]

 

 Preferred Language  English

 

 Marital Status  Never 

 

 Jainism Affiliation  Unknown

 

 Race  White

 

 Ethnic Group  Not  or 





Author







 Author  Antonella Ventura  [facility]

 

 Address  [address]

[place], [place]  [id number]



 

 Phone  [phone]







Care Team Providers







 Care Team Member Name  Role  Phone

 

 Antonella Ventura  PCP  Unavailable







Allergies and Adverse Reactions







 Name  Reaction  Notes

 

 NO KNOWN DRUG ALLERGIES      







Plan of Treatment







 Planned Activity  Comments  Planned Date  Planned Time  Plan/Goal

 

 CULTURE SCREEN ONLY     2016  12:00 AM   







Medications







  

 

 Name  Start Date  Expiration Date  SIG  Comments

 

 Nata[place] 60 mg iron-1 mg oral tablet  4/15/2010  3/11/2011  take 1 tablet by 
oral route once daily for 30 days   

 

 Diflucan 150 mg oral tablet  2010  take 1 tablet (150 mg) by 
oral route once  for 1 days   

 

 Premarin 1.25 mg oral tablet  2011  take 1 tablet by oral route 
daily for 10 days   

 

 doxycycline hyclate 100 mg oral tablet  7/15/2011  2011  take 1 tablet (
100 mg) by oral route every 12 hours for 10 days   

 

 ibuprofen 800 mg oral tablet  2012  take 1 tablet by oral route 
3 times a day for 30 days   

 

 Bactrim -160 mg oral tablet  2012  take 1 tablet by oral 
route every 12 hours for 7 days   

 

 Macrobid 100 mg oral capsule  2016  take 1 capsule (100 mg) by 
oral route 2 times per day with food for 7 days   









 Discontinued 

 

 Name  Start Date  Discontinued Date  SIG  Comments

 

 atenolol 25 mg oral tablet     2011  take 1/2 tablet by oral route QD   

 

 Medrol (Antwan) 4 mg oral tablets,dose pack  2012  3/26/2012  take as 
directed   







Problem List







 Description  Status  Onset

 

 Prenatal Care, High Risk Pregnancy  Active  2010







Vital Signs







 Date  Time  BP-Sys(mm[Hg]  BP-Marisa(mm[Hg])  HR(bpm)  RR(rpm)  Temp  WT  HT  HC  
BMI  BSA  BMI Percentile  O2 Sat(%)

 

 2016  11:15:00 AM  130 mmHg  70 mmHg  80 bpm  20 rpm     214 lbs  64 in  
   36.73 kg/m2  2.09 m2      

 

 3/26/2012  10:30:00 AM  126 mmHg  62 mmHg  106 bpm  18 rpm  96.8 F  205 lbs  
64 in     35.1878 kg/m  2.0491 m     97 %

 

 2012  1:22:00 PM  120 mmHg  68 mmHg  66 bpm  18 rpm  97 F  202.375 lbs  
64 in     34.74 kg/m2  2.04 m2      

 

 2012  1:44:00 PM  122 mmHg  64 mmHg  68 bpm  18 rpm  98.2 F  198.125 lbs  
64 in     34.0077 kg/m  2.0145 m      

 

 1/3/2012  9:40:00 AM  103 mmHg  77 mmHg  64 bpm  18 rpm  97.7 F  197.125 lbs  
64 in     33.84 kg/m2  2.01 m2      

 

 2011  9:06:00 AM  118 mmHg  68 mmHg  74 bpm     97.4 F  189 lbs          
        

 

 7/15/2011  10:06:00 AM  126 mmHg  70 mmHg  73 bpm  20 rpm  97.9 F  180.5 lbs  
64 in     30.98 kg/m2  1.92 m2  0 %  99 %

 

 2011  8:56:00 AM  96 mmHg  68 mmHg  66 bpm  20 rpm  97.5 F  177.5 lbs  64 
in     30.4675 kg/m  1.9067 m  0 %  98 %

 

 12/15/2010  10:43:00 AM  104 mmHg  64 mmHg  48 bpm     97.8 F  150 lbs        
          

 

 2010  9:15:00 AM  112 mmHg  72 mmHg  49 bpm     98 F  149 lbs  64 in     
25.5755 kg/m  1.747 m  81.5 %   

 

 2010  8:54:00 AM  117 mmHg  65 mmHg  73 bpm  20 rpm  97.8 F  160.5 lbs   
               

 

 2010  9:42:00 AM  134 mmHg  66 mmHg  66 bpm        161 lbs               
   

 

 4/15/2010  4:02:00 PM  135 mmHg  71 mmHg  77 bpm        162 lbs  64 in     
27.81 kg/m2  1.82 m2  89.9 %   







Social History







 Name  Description  Comments

 

 History of tobacco usage     Quit smoking in January - was smoking 1-2cig/day

 

 denies alcohol use      

 

 Tobacco  Former smoker   







History of Procedures







 Date Ordered  Description  Order Status

 

 2011 12:00 AM  URINE PREGNANCY TEST  Reviewed

 

 2011 12:00 AM  IMMUNIZATION ADMIN  Reviewed

 

 2011 12:00 AM  HPV VACCINE 4 VALENT IM  Reviewed

 

 2016 12:00 AM  CYTOPATH C/V THIN LAYER  Returned

 

 2016 12:00 AM  SPECIMEN HANDLING OFFICE-LAB  Reviewed

 

 2016 12:00 AM  N.GONORRHOEAE DNA AMP PROB  Returned

 

 2016 12:00 AM  CHLAMYDIA CULTURE  Returned

 

 2016 12:00 AM  HIV-1ANTIBODY  Returned

 

 2016 12:00 AM  URINALYSIS AUTO W/SCOPE  Returned

 

 2016 12:00 AM  OBSTETRIC PANEL  Returned

 

 2016 12:00 AM  GLUCOSE TOLERANCE TEST (GTT)  Returned

 

 3/28/2016 12:00 AM  ALPHA-FETOPROTEIN SERUM  Returned

 

 3/28/2016 12:00 AM  CHORIONIC GONADOTROPIN TEST  Returned

 

 3/28/2016 12:00 AM  CHORIONIC GONADOTROPIN ASSAY  Returned

 

 5/3/2016 12:00 AM  OB US >/=14 WKS SNGL FETUS  Returned

 

 2016 12:00 AM  RH IG FULL-DOSE IM  Returned

 

 2016 12:00 AM  Type and screen  Returned

 

 2016 12:00 AM  GLUCOSE TOLERANCE TEST (GTT)  Returned

 

 2016 12:00 AM  COMPLETE CBC W/AUTO DIFF WBC  Returned

 

 2012 12:00 AM  X-RAY EXAM KNEE 4 OR MORE  Returned

 

 2016 12:00 AM  TDAP VACCINE 7 YRS/> IM  Reviewed

 

 2016 12:00 AM  IMMUNIZATION ADMIN  Reviewed

 

 4/15/2010 12:00 AM  OBSTETRIC PANEL  Reviewed

 

 4/15/2010 12:00 AM  HIV-1ANTIBODY  Reviewed

 

 4/15/2010 12:00 AM  URINALYSIS NONAUTO W/SCOPE  Reviewed

 

 4/15/2010 12:00 AM  OB US < 14 WKS SINGLE FETUS  Reviewed

 

 2010 12:00 AM  GLUCOSE TEST  Reviewed

 

 2010 12:00 AM  Rhogam  Reviewed

 

 2010 12:00 AM  Type and screen  Reviewed

 

 2010 12:00 AM  COMPLETE CBC W/AUTO DIFF WBC  Reviewed

 

 2010 12:00 AM  COMPLETE CBC W/AUTO DIFF WBC  Reviewed

 

 10/14/2010 12:00 AM  CULTURE OTHR SPECIMN AEROBIC  Reviewed

 

 12/15/2010 12:00 AM  INSERTION IMPLANON, IMPLANTABLE CONTRACEPTIVE CAPSULES  
Reviewed

 

 12/15/2010 12:00 AM  Etonogestrel (contraceptive) implant system, (Implanon)  
Reviewed

 

 7/15/2011 12:00 AM  CYTOPATH TBS C/V MANUAL  Reviewed

 

 7/15/2011 12:00 AM  CHLAMYDIA CULTURE  Reviewed

 

 7/15/2011 12:00 AM  N.GONORRHOEAE DNA AMP PROB  Reviewed

 

 7/15/2011 12:00 AM  URINE PREGNANCY TEST  Reviewed

 

 2011 12:00 AM  IMMUNIZATION ADMIN  Reviewed

 

 2011 12:00 AM  HPV VACCINE 4 VALENT IM  Reviewed







Results Summary







 Data and Description  Results

 

 2010 10:41 AM  RUBELLA 34.0 IU/mLCOLOR YELLOW APPEARANCE HAZY SPEC GRAV 
1.025 pH 7.0 PROTEIN TRACE GLUCOSE NEGATIVE KETONE 15 BILIRUBIN NEGATIVE BLOOD 
NEGATIVE NITRITE NEGATIVE LEUK SCREEN NEGATIVE CASTS/LPF NEGATIVE CRYSTALS 
TRACE AMORPH MUCOUS THRDS FEW BACTERIA NEGATIVE EPITH CELLS FEW SQUAMOUS 
TRICHOMONAS NEGATIVE YEAST NEGATIVE WBC 6.3 RBC 4.57 HGB 13.90 g/dLHCT 40.30 %
MCV 88.0 fLMCH 30.40 pgMCHC 34.50 g/dLMPV 9.20 fLPLT 258 %NEUT 65.10 %%LYMP 
28.30 %#NEUT 4.10 #LYMP 1.80 

 

 2010 3:48 PM  WBC 12.9 RBC 4.02 HGB 12.60 g/dLHCT 37.40 %MCV 93.0 fLMCH 
31.30 pgMCHC 33.70 g/dLRDW CV 14.10 %MPV 9.50 fLPLT 263 %NEUT 69.20 %%LYMP 
22.30 %%MONO 7.60 %%EOS 0.70 %%BASO 0.20 %#NEUT 8.91 #LYMP 2.88 #MONO 0.98 #EOS 
0.09 #BASO 0.03 

 

 10/22/2010 8:21 PM  COLOR YELLOW APPEARANCE CLEAR SPEC GRAV 1.010 pH 6.5 
PROTEIN NEGATIVE GLUCOSE NEGATIVE KETONE 15 BILIRUBIN NEGATIVE BLOOD NEGATIVE 
NITRITE NEGATIVE LEUK SCREEN SMALL CASTS/LPF NEGATIVE CRYSTALS NEGATIVE MUCOUS 
THRDS NEGATIVE BACTERIA 2++ EPITH CELLS 3+++ SQUAMOUS TRICHOMONAS NEGATIVE 
YEAST NEGATIVE 

 

 2010 11:58 PM  COLOR YELLOW APPEARANCE CLEAR SPEC GRAV 1.015 pH 6.5 
PROTEIN NEGATIVE GLUCOSE NEGATIVE KETONE NEGATIVE BILIRUBIN NEGATIVE BLOOD 
NEGATIVE NITRITE NEGATIVE LEUK SCREEN LARGE CASTS/LPF NEGATIVE CRYSTALS 
NEGATIVE MUCOUS THRDS NEGATIVE BACTERIA 2++ EPITH CELLS 2++ SQUAMOUS 
TRICHOMONAS NEGATIVE YEAST NEGATIVE 

 

 11/15/2010 5:30 PM  .0 IU/LURIC ACID 3.7 mg/dLGLUCOSE 66.0 mg/dLSODIUM 
137.0 mmol/LPOTASSIUM 3.10 mmol/LCHLORIDE 105.0 mmol/LCO2 21.0 mmol/LBUN 3.0 mg/
dLCREATININE 0.50 mg/dLSGOT/AST 20.0 IU/LSGPT/ALT 18.0 IU/LALK PHOS 152.0 IU/
LTOTAL PROTEIN 6.20 g/dLALBUMIN 3.40 g/dLTOTAL BILI 0.60 mg/dLCALCIUM 9.10 mg/
dLeGFR >60 mL/min/1.73 m2WBC 16.9 RBC 4.18 HGB 13.30 g/dLHCT 39.70 %MCV 95.0 
fLMCH 31.80 pgMCHC 33.50 g/dLRDW CV 14.0 %MPV 10.10 fLPLT 268 %NEUT 75.70 %%
LYMP 17.50 %%MONO 6.20 %%EOS 0.40 %%BASO 0.20 %#NEUT 12.82 #LYMP 2.97 #MONO 
1.05 #EOS 0.06 #BASO 0.03 

 

 11/15/2010 8:50 PM  PROTEIN UR 14.0 mg/dL

 

 2016 12:21 PM  Pap Smear Collected 

 

 2016 2:00 PM  WBC 9.8 RBC 4.60 HGB 13.60 g/dLHCT 41.40 %MCV 90.0 fLMCH 
29.60 pgMCHC 32.90 g/dLRDW CV 13.80 %MPV 9.70 fLPLT 271 %NEUT 59.80 %%LYMP 33.0 
%%MONO 6.40 %%EOS 0.60 %%BASO 0.20 %#NEUT 5.83 #LYMP 3.22 #MONO 0.62 #EOS 0.06 #
BASO 0.02 HIV AG/AB COMBO 0.14 RPR Non Reactive Rubella Antibodies, IgG 2.88 
Index

 

 2016 2:08 PM  COLOR YELLOW APPEARANCE CLEAR SPEC GRAV <=1.005 pH 6.0 
PROTEIN NEGATIVE GLUCOSE NEGATIVE mg/dLKETONE NEGATIVE BILIRUBIN NEGATIVE BLOOD 
NEGATIVE NITRITE NEGATIVE LEUK SCREEN TRACE CASTS/LPF NEGATIVE /LPFCRYSTALS 
NEGATIVE MUCOUS THRDS NEGATIVE BACTERIA NEGATIVE EPITH CELLS 3+++ SQUAMOUS /
HPFTRICHOMONAS NEGATIVE YEAST NEGATIVE 

 

 3/28/2016 2:18 PM  AFP Value 0.0240 ug/mLAFP MoM 1.02 hCG Value 24426.0 mIU/
mLhCG MoM 1.77 uE3 Value 0.70 ng/mLuE3 MoM 1.16 MARISA Value 207.520 pg/mLDIA MoM 
1.25 OSBR Risk       1 IN [address]87 DSR (Second Trimester) 1IN 4435 DSR (By Age)    
1 IN 1003 T18 Risk Not increased T18 (By Age) 1:3907 

 

 2016 3:15 PM  WBC 11.5 RBC 3.75 HGB 11.70 g/dLHCT 35.50 %MCV 95.0 fLMCH 
31.20 pgMCHC 33.0 g/dLRDW CV 14.10 %MPV 9.70 fLPLT 273 %NEUT 73.0 %%LYMP 20.10 %
%MONO 5.70 %%EOS 0.80 %%BASO 0.10 %#NEUT 8.40 #LYMP 2.31 #MONO 0.65 #EOS 0.09 #
BASO 0.01 







History Of Immunizations







 Name  Date Admin  Mfg Name  Mfg Code  Trade Name  Lot#  Route  Inj  Vis Given  
Vis Pub  CVX

 

 HPV  2011  [employer]  MSD  GARDASIL  [id number]  Intramuscular  Left 
Deltoid  2011  999

 

 HPV  2011  [employer]  MSD  GARDASIL  62469  Intramuscular  Left 
Deltoid  2011  62

 

 Tdap  2016  GlaxoSmAdvanced Image Enhancementine  SKB  BOOSTRIX  B4G4G  Intramuscular  Right 
Deltoid  2016  115







History of Past Illness







 Name  Date of Onset  Comments

 

 Atrial Septal Defect, Ostium Secundum Type      

 

 Prenatal Care, High Risk Pregnancy  2010   

 

    Apr 15 2010  4:29PM   

 

 Prenatal Care, High Risk Pregnancy  Apr 15 2010  4:13PM   

 

    2010  9:54AM   

 

 Prenatal Care, High Risk Pregnancy  2010 10:10AM   

 

    May 13 2010  9:01AM   

 

    John 10 2010  2:45PM   

 

    2010  4:16PM   

 

 Pregnancy, First Normal  Aug 30 2010  9:14AM   

 

 Pregnancy, High Risk  Sep 13 2010  3:13PM   

 

 Group B Strep Screening,   Oct 14 2010  5:43PM   

 

 Pregnancy, High Risk  Oct 14 2010  5:43PM   

 

 Postoperative Examination Following Surgery  Dec  6 2010  9:15AM   

 

 IMPLANON  Insertion  Dec 15 2010 10:44AM   

 

 General Medical Exam, Child  2011  8:58AM   

 

 Contraceptive Management  2011  8:58AM   

 

 Routine gynecological examination  Jul 15 2011 10:03AM   

 

 Contraceptive Counseling  Jul 15 2011 10:03AM   

 

 Vaginal Discharge  Jul 15 2011 10:03AM   

 

 Irregular Menses  Jul 15 2011 10:03AM   

 

 Gardsil (HPV)  2011  8:54AM   

 

 Irregular Menses  Aug 31 2011  9:07AM   

 

 Family Planning  Aug 31 2011  9:07AM   

 

 Contraceptive Management  Andreas  3 2012  9:43AM   

 

 Local Infection  Andreas  3 2012  9:43AM   

 

 Well Child Examination  2012  1:46PM   

 

 Right knee pain  2012  1:46PM   

 

 Right knee pain  2012  1:24PM   

 

 Abscess  2012  1:24PM   

 

 Right knee pain  Mar 26 2012 10:32AM   

 

 General Medical Exam, Adult  2012  1:46PM   

 

 Pregnancy test confirmed positive  2016 11:37AM   

 

 Obesity  2016 11:37AM   

 

 Normal pregnancy in multigravida in second trimester  Mar 28 2016  2:01PM   

 

 History of atrial septal defect repair  May 11 2016  8:50AM   

 

 Normal pregnancy in multigravida in second trimester  2016  2:00PM   

 

 Need for Tdap vaccine  2016  1:38PM   

 

 Group B Strep Screening,   Aug 23 2016  3:43PM   

 

 Normal pregnancy in multigravida in third trimester  Aug 23 2016  3:43PM   







Payers







 Insurance Name  Company Name  Plan Name  Plan Number  Policy Number  Policy 
Group Number  Start Date

 

    [facility] [employer] [facility] [employer]  
   [id number]     N/A

 

    [place] [employer]  [place] [employer]     
[id number]     N/A

 

    [id number] Medicare A  Test Medicare A     void     N/A







History of Encounters







 Visit Date  Visit Type  Provider

 

 2016  Office visit  Dr. Antonella Ventura MD

 

 2016  Office visit  Dr. Antonella Ventura MD

 

 2016  Office visit  Dr. Antonella Ventura MD

 

 2016  Office visit  Dr. Antonella Ventura MD

 

 2016  Office visit  Dr. Antonella Ventura MD

 

 3/28/2016  Office visit  Dr. Antonella Ventura MD

 

 2016  Office visit  Dr. Antonella Ventura MD

 

 2016  Office visit  Dr. Antonella Ventura MD

 

 2016  Office visit  Velma Mendieta APRN

 

 3/26/2012  Office visit  Ivanna Walker APRN

 

 2012  Office visit  Ivanna Walker APRN

 

 2012  Office visit  Ivanna Walker APRN

 

 1/3/2012  Office visit  Ivanna Walker APRN

 

 2011  Nurse visit  Ivanna Walker APRN

 

 2011  Nurse visit  Ivanna Walker APRN

 

 7/15/2011  Office visit  Ivanna Walker APRN

 

 2011  Office visit  Ivanna Walker APRN

 

 12/15/2010  Office visit  Willard Sorensen MD

 

 2010  Surgery  Willard Sorensen MD

 

 2010  [facility]  Willrad Sorensen MD

 

 11/15/2010  Office visit  Willard Sorensen MD

 

 11/15/2010  [facility]  Willard Sorensen MD

 

 2010  Office visit  Willard Sorensen MD

 

 2010  Office visit  Willard Sorensen MD

 

 10/28/2010  Office visit  Willard Sorensen MD

 

 10/21/2010  Office visit  Willard Sroensen MD

 

 10/14/2010  Office visit  Willard Sorensen MD

 

 10/9/2010  [facility]  Willard Sorensen MD

 

 10/7/2010  Office visit  Willard Sorensen MD

 

 2010  Office visit  Willard Sorensen MD

 

 2010  Office visit  Willard Sorensen MD

 

 2010  Office visit  Willard Sorensen MD

 

 2010  Office visit  Willard Sorensen MD

 

 2010  Office visit  Willard Sorensen MD

 

 6/10/2010  Office visit  Willard Sorensen MD

 

 2010  Office visit  Willard Sorensen MD

 

 2010  Office visit  Willard Sorensen MD

 

 4/15/2010  Office visit  Willard Sorensen MD

## 2018-11-29 NOTE — XMS REPORT
MU2 Ambulatory Summary

 Created on: 2016



Radha Aviles

External Reference #: [id number]

: 1990

Sex: Female



Demographics







 Address  [address]

[place]  [address]

 

 Home Phone  [phone]

 

 Preferred Language  English

 

 Marital Status  Never 

 

 Mor[employer] Affiliation  Unknown

 

 Race  White

 

 Ethnic Group  Not  or 





Author







 Author  Velma Mendieta

 

 [facility]

 

 Address  [address]

[place], [place]  [id number]



 

 Phone  [phone]







Care Team Providers







 Care Team Member Name  Role  Phone

 

 Velma Mendieta  PCP  Unavailable







Allergies and Adverse Reactions







 Name  Reaction  Notes

 

 NO KNOWN DRUG ALLERGIES      







Plan of Treatment

Not available.



Medications







 Active 

 

 Name  Start Date  Estimated Completion Date  SIG  Comments

 

 Macrobid 100 mg oral capsule  2016  take 1 capsule (100 mg) by 
oral route 2 times per day with food for 7 days   









  

 

 Name  Start Date  Expiration Date  SIG  Comments

 

 [place] 60 mg iron-1 mg oral tablet  4/15/2010  3/11/2011  take 1 tablet by 
oral route once daily for 30 days   

 

 Diflucan 150 mg oral tablet  2010  take 1 tablet (150 mg) by 
oral route once  for 1 days   

 

 Premarin 1.25 mg oral tablet  2011  take 1 tablet by oral route 
daily for 10 days   

 

 doxycycline hyclate 100 mg oral tablet  7/15/2011  2011  take 1 tablet (
100 mg) by oral route every 12 hours for 10 days   

 

 ibuprofen 800 mg oral tablet  2012  take 1 tablet by oral route 
3 times a day for 30 days   

 

 Bactrim -160 mg oral tablet  2012  take 1 tablet by oral 
route every 12 hours for 7 days   









 Discontinued 

 

 Name  Start Date  Discontinued Date  SIG  Comments

 

 atenolol 25 mg oral tablet     2011  take 1/2 tablet by oral route QD   

 

 Medrol (Antwan) 4 mg oral tablets,dose pack  2012  3/26/2012  take as 
directed   







Problem List







 Description  Status  Onset

 

 Prenatal Care, High Risk Pregnancy  Active  2010







Vital Signs







 Date  Time  BP-Sys(mm[Hg]  BP-Marisa(mm[Hg])  HR(bpm)  RR(rpm)  Temp  WT  HT  HC  
BMI  BSA  BMI Percentile  O2 Sat(%)

 

 2016  11:15:00 AM  130 mmHg  70 mmHg  80 bpm  20 rpm     214 lbs  64 in  
   36.73 kg/m2  2.09 m2      

 

 3/26/2012  10:30:00 AM  126 mmHg  62 mmHg  106 bpm  18 rpm  96.8 F  205 lbs  
64 in     35.1878 kg/m  2.0491 m     97 %

 

 2012  1:22:00 PM  120 mmHg  68 mmHg  66 bpm  18 rpm  97 F  202.375 lbs  
64 in     34.74 kg/m2  2.04 m2      

 

 2012  1:44:00 PM  122 mmHg  64 mmHg  68 bpm  18 rpm  98.2 F  198.125 lbs  
64 in     34.0077 kg/m  2.0145 m      

 

 1/3/2012  9:40:00 AM  103 mmHg  77 mmHg  64 bpm  18 rpm  97.7 F  197.125 lbs  
64 in     33.84 kg/m2  2.01 m2      

 

 2011  9:06:00 AM  118 mmHg  68 mmHg  74 bpm     97.4 F  189 lbs          
        

 

 7/15/2011  10:06:00 AM  126 mmHg  70 mmHg  73 bpm  20 rpm  97.9 F  180.5 lbs  
64 in     30.98 kg/m2  1.92 m2  0 %  99 %

 

 2011  8:56:00 AM  96 mmHg  68 mmHg  66 bpm  20 rpm  97.5 F  177.5 lbs  64 
in     30.4675 kg/m  1.9067 m  0 %  98 %

 

 12/15/2010  10:43:00 AM  104 mmHg  64 mmHg  48 bpm     97.8 F  150 lbs        
          

 

 2010  9:15:00 AM  112 mmHg  72 mmHg  49 bpm     98 F  149 lbs  64 in     
25.5755 kg/m  1.747 m  81.5 %   

 

 2010  8:54:00 AM  117 mmHg  65 mmHg  73 bpm  20 rpm  97.8 F  160.5 lbs   
               

 

 2010  9:42:00 AM  134 mmHg  66 mmHg  66 bpm        161 lbs               
   

 

 4/15/2010  4:02:00 PM  135 mmHg  71 mmHg  77 bpm        162 lbs  64 in     
27.81 kg/m2  1.82 m2  89.9 %   







Social History







 Name  Description  Comments

 

 History of tobacco usage     Quit smoking in January - was smoking 1-2cig/day

 

 denies alcohol use      

 

 Tobacco  Current every day smoker   







History of Procedures







 Date Ordered  Description  Order Status

 

 2011 12:00 AM  URINE PREGNANCY TEST  Reviewed

 

 2011 12:00 AM  IMMUNIZATION ADMIN  Reviewed

 

 2011 12:00 AM  HPV VACCINE 4 VALENT IM  Reviewed

 

 2016 12:00 AM  CYTOPATH C/V THIN LAYER  Returned

 

 2016 12:00 AM  N.GONORRHOEAE DNA AMP PROB  Returned

 

 2016 12:00 AM  CHLAMYDIA CULTURE  Returned

 

 2016 12:00 AM  HIV-1ANTIBODY  Returned

 

 2016 12:00 AM  URINALYSIS AUTO W/SCOPE  Returned

 

 2016 12:00 AM  OBSTETRIC PANEL  Returned

 

 2016 12:00 AM  GLUCOSE TOLERANCE TEST (GTT)  Returned

 

 2012 12:00 AM  X-RAY EXAM KNEE 4 OR MORE  Returned

 

 4/15/2010 12:00 AM  OBSTETRIC PANEL  Reviewed

 

 4/15/2010 12:00 AM  HIV-1ANTIBODY  Reviewed

 

 4/15/2010 12:00 AM  URINALYSIS NONAUTO W/SCOPE  Reviewed

 

 4/15/2010 12:00 AM  OB US < 14 WKS SINGLE FETUS  Reviewed

 

 2010 12:00 AM  GLUCOSE TEST  Reviewed

 

 2010 12:00 AM  Rhogam  Reviewed

 

 2010 12:00 AM  Type and screen  Reviewed

 

 2010 12:00 AM  COMPLETE CBC W/AUTO DIFF WBC  Reviewed

 

 2010 12:00 AM  COMPLETE CBC W/AUTO DIFF WBC  Reviewed

 

 10/14/2010 12:00 AM  CULTURE OTHR SPECIMN AEROBIC  Reviewed

 

 12/15/2010 12:00 AM  INSERTION IMPLANON, IMPLANTABLE CONTRACEPTIVE CAPSULES  
Reviewed

 

 12/15/2010 12:00 AM  Etonogestrel (contraceptive) implant system, (Implanon)  
Reviewed

 

 7/15/2011 12:00 AM  CYTOPATH TBS C/V MANUAL  Reviewed

 

 7/15/2011 12:00 AM  CHLAMYDIA CULTURE  Reviewed

 

 7/15/2011 12:00 AM  N.GONORRHOEAE DNA AMP PROB  Reviewed

 

 7/15/2011 12:00 AM  URINE PREGNANCY TEST  Reviewed

 

 2011 12:00 AM  IMMUNIZATION ADMIN  Reviewed

 

 2011 12:00 AM  HPV VACCINE 4 VALENT IM  Reviewed







Results Summary







 Data and Description  Results

 

 2010 10:41 AM  RUBELLA 34.0 IU/mLCOLOR YELLOW APPEARANCE HAZY SPEC GRAV 
1.025 pH 7.0 PROTEIN TRACE GLUCOSE NEGATIVE KETONE 15 BILIRUBIN NEGATIVE BLOOD 
NEGATIVE NITRITE NEGATIVE LEUK SCREEN NEGATIVE CASTS/LPF NEGATIVE CRYSTALS 
TRACE AMORPH MUCOUS THRDS FEW BACTERIA NEGATIVE EPITH CELLS FEW SQUAMOUS 
TRICHOMONAS NEGATIVE YEAST NEGATIVE WBC 6.3 RBC 4.57 HGB 13.90 g/dLHCT 40.30 %
MCV 88.0 fLMCH 30.40 pgMCHC 34.50 g/dLMPV 9.20 fLPLT 258 %NEUT 65.10 %%LYMP 
28.30 %#NEUT 4.10 #LYMP 1.80 

 

 2010 3:48 PM  WBC 12.9 RBC 4.02 HGB 12.60 g/dLHCT 37.40 %MCV 93.0 fLMCH 
31.30 pgMCHC 33.70 g/dLRDW CV 14.10 %MPV 9.50 fLPLT 263 %NEUT 69.20 %%LYMP 
22.30 %%MONO 7.60 %%EOS 0.70 %%BASO 0.20 %#NEUT 8.91 #LYMP 2.88 #MONO 0.98 #EOS 
0.09 #BASO 0.03 

 

 10/22/2010 8:21 PM  COLOR YELLOW APPEARANCE CLEAR SPEC GRAV 1.010 pH 6.5 
PROTEIN NEGATIVE GLUCOSE NEGATIVE KETONE 15 BILIRUBIN NEGATIVE BLOOD NEGATIVE 
NITRITE NEGATIVE LEUK SCREEN SMALL CASTS/LPF NEGATIVE CRYSTALS NEGATIVE MUCOUS 
THRDS NEGATIVE BACTERIA 2++ EPITH CELLS 3+++ SQUAMOUS TRICHOMONAS NEGATIVE 
YEAST NEGATIVE 

 

 2010 11:58 PM  COLOR YELLOW APPEARANCE CLEAR SPEC GRAV 1.015 pH 6.5 
PROTEIN NEGATIVE GLUCOSE NEGATIVE KETONE NEGATIVE BILIRUBIN NEGATIVE BLOOD 
NEGATIVE NITRITE NEGATIVE LEUK SCREEN LARGE CASTS/LPF NEGATIVE CRYSTALS 
NEGATIVE MUCOUS THRDS NEGATIVE BACTERIA 2++ EPITH CELLS 2++ SQUAMOUS 
TRICHOMONAS NEGATIVE YEAST NEGATIVE 

 

 11/15/2010 5:30 PM  .0 IU/LURIC ACID 3.7 mg/dLGLUCOSE 66.0 mg/dLSODIUM 
137.0 mmol/LPOTASSIUM 3.10 mmol/LCHLORIDE 105.0 mmol/LCO2 21.0 mmol/LBUN 3.0 mg/
dLCREATININE 0.50 mg/dLSGOT/AST 20.0 IU/LSGPT/ALT 18.0 IU/LALK PHOS 152.0 IU/
LTOTAL PROTEIN 6.20 g/dLALBUMIN 3.40 g/dLTOTAL BILI 0.60 mg/dLCALCIUM 9.10 mg/
dLeGFR >60 mL/min/1.73 m2WBC 16.9 RBC 4.18 HGB 13.30 g/dLHCT 39.70 %MCV 95.0 
fLMCH 31.80 pgMCHC 33.50 g/dLRDW CV 14.0 %MPV 10.10 fLPLT 268 %NEUT 75.70 %%
LYMP 17.50 %%MONO 6.20 %%EOS 0.40 %%BASO 0.20 %#NEUT 12.82 #LYMP 2.97 #MONO 
1.05 #EOS 0.06 #BASO 0.03 

 

 11/15/2010 8:50 PM  PROTEIN UR 14.0 mg/dL

 

 2016 12:21 PM  Pap Smear Collected 

 

 2016 2:00 PM  WBC 9.8 RBC 4.60 HGB 13.60 g/dLHCT 41.40 %MCV 90.0 fLMCH 
29.60 pgMCHC 32.90 g/dLRDW CV 13.80 %MPV 9.70 fLPLT 271 %NEUT 59.80 %%LYMP 33.0 
%%MONO 6.40 %%EOS 0.60 %%BASO 0.20 %#NEUT 5.83 #LYMP 3.22 #MONO 0.62 #EOS 0.06 #
BASO 0.02 HIV AG/AB COMBO 0.14 RPR Non Reactive Rubella Antibodies, IgG 2.88 
Index

 

 2016 2:08 PM  COLOR YELLOW APPEARANCE CLEAR SPEC GRAV <=1.005 pH 6.0 
PROTEIN NEGATIVE GLUCOSE NEGATIVE mg/dLKETONE NEGATIVE BILIRUBIN NEGATIVE BLOOD 
NEGATIVE NITRITE NEGATIVE LEUK SCREEN TRACE CASTS/LPF NEGATIVE /LPFCRYSTALS 
NEGATIVE MUCOUS THRDS NEGATIVE BACTERIA NEGATIVE EPITH CELLS 3+++ SQUAMOUS /
HPFTRICHOMONAS NEGATIVE YEAST NEGATIVE 







History Of Immunizations







 Name  Date Admin  Mfg Name  Mfg Code  Trade Name  Lot#  Route  Inj  Vis Given  
Vis Pub  CVX

 

 HPV  2011  [employer]  MSD  GARDASIL  [id number]  Intramuscular  Left 
Deltoid  2011  999

 

 HPV  2011  [employer]  MSD  GARDASIL  14251  Intramuscular  Left 
Deltoid  2011  62







History of Past Illness







 Name  Date of Onset  Comments

 

 Atrial Septal Defect, Ostium Secundum Type      

 

 Prenatal Care, High Risk Pregnancy  2010   

 

    Apr 15 2010  4:29PM   

 

 Prenatal Care, High Risk Pregnancy  Apr 15 2010  4:13PM   

 

    2010  9:54AM   

 

 Prenatal Care, High Risk Pregnancy  2010 10:10AM   

 

    May 13 2010  9:01AM   

 

    John 10 2010  2:45PM   

 

    2010  4:16PM   

 

 Pregnancy, First Normal  Aug 30 2010  9:14AM   

 

 Pregnancy, High Risk  Sep 13 2010  3:13PM   

 

 Group B Strep Screening,   Oct 14 2010  5:43PM   

 

 Pregnancy, High Risk  Oct 14 2010  5:43PM   

 

 Postoperative Examination Following Surgery  Dec  6 2010  9:15AM   

 

 IMPLANON  Insertion  Dec 15 2010 10:44AM   

 

 General Medical Exam, Child  2011  8:58AM   

 

 Contraceptive Management  2011  8:58AM   

 

 Routine gynecological examination  Jul 15 2011 10:03AM   

 

 Contraceptive Counseling  Jul 15 2011 10:03AM   

 

 Vaginal Discharge  Jul 15 2011 10:03AM   

 

 Irregular Menses  Jul 15 2011 10:03AM   

 

 Gardsil (HPV)  2011  8:54AM   

 

 Irregular Menses  Aug 31 2011  9:07AM   

 

 Family Planning  Aug 31 2011  9:07AM   

 

 Contraceptive Management  Andreas  3 2012  9:43AM   

 

 Local Infection  Andreas  3 2012  9:43AM   

 

 Well Child Examination  2012  1:46PM   

 

 Right knee pain  2012  1:46PM   

 

 Right knee pain  2012  1:24PM   

 

 Abscess  2012  1:24PM   

 

 Right knee pain  Mar 26 2012 10:32AM   

 

 General Medical Exam, Adult  2012  1:46PM   

 

 Pregnancy test confirmed positive  2016 11:37AM   

 

 Obesity  2016 11:37AM   







Payers







 Insurance Name  Company Name  Plan Name  Plan Number  Policy Number  Policy 
Group Number  Start Date

 

    [employer] [employer]  [place] [employer]     
[id number]     N/A

 

    [facility]     [facility]     N/A







History of Encounters







 Visit Date  Visit Type  Provider

 

 2016  Office visit  Velma Mendieta APRN

 

 3/26/2012  Office visit  Ivanna Swan APRN

 

 2012  Office visit  Ivanna Swan APRN

 

 2012  Office visit  Ivanna Sawn APRN

 

 1/3/2012  Office visit  Ivanna Swan APRN

 

 2011  Nurse visit  Ivanna Swan APRN

 

 2011  Nurse visit  Ivanna Swan APRN

 

 7/15/2011  Office visit  Ivanna Swan APRN

 

 2011  Office visit  Ivanna Swan APRN

 

 12/15/2010  Office visit  Willard Sorensen MD

 

 2010  Surgery  Willard Sorensen MD

 

 2010  Hospital  Willard Sorensen MD

 

 11/15/2010  Office visit  Willard Sorensen MD

 

 11/15/2010  [facility]  Willard Sorensen MD

 

 2010  Office visit  Willard Sorensen MD

 

 2010  Office visit  Willard Sorensen MD

 

 10/28/2010  Office visit  Willard Sorensen MD

 

 10/21/2010  Office visit  Willard Sorensen MD

 

 10/14/2010  Office visit  Willard Sorensen MD

 

 10/9/2010  [facility]  Willard Sorensen MD

 

 10/7/2010  Office visit  Willard Sorensen MD

 

 2010  Office visit  Willard Sorensen MD

 

 2010  Office visit  Willard Sorensen MD

 

 2010  Office visit  Willard Sorensen MD

 

 2010  Office visit  Willard Sorensen MD

 

 2010  Office visit  Willard Sorensen MD

 

 6/10/2010  Office visit  Willard Sorensen MD

 

 2010  Office visit  Willard Sorensen MD

 

 2010  Office visit  Willard Sorensen MD

 

 4/15/2010  Office visit  Willard Sorensen MD

## 2018-11-29 NOTE — XMS REPORT
MU2 Ambulatory Summary

 Created on: 2016



Radha Aviles

External Reference #: [id number]

: 1990

Sex: Female



Demographics







 Address  [facility] [address]

[place]  [address]

 

 Home Phone  [phone]

 

 Preferred Language  English

 

 Marital Status  Never 

 

 [employer] Affiliation  Unknown

 

 Race  White

 

 Ethnic Group  Not  or 





Author







 Author  Antonella Ventura

 

 [facility]

 

 Address  [address]

[place], [place]  [address]



 

 Phone  [phone]







Care Team Providers







 Care Team Member Name  Role  Phone

 

 Antonella Ventura  PCP  Unavailable







Allergies and Adverse Reactions







 Name  Reaction  Notes

 

 NO KNOWN DRUG ALLERGIES      







Plan of Treatment

Not available.



Medications







  

 

 Name  Start Date  Expiration Date  SIG  Comments

 

 [place] 60 mg iron-1 mg oral tablet  4/15/2010  3/11/2011  take 1 tablet by 
oral route once daily for 30 days   

 

 Diflucan 150 mg oral tablet  2010  take 1 tablet (150 mg) by 
oral route once  for 1 days   

 

 Premarin 1.25 mg oral tablet  2011  take 1 tablet by oral route 
daily for 10 days   

 

 doxycycline hyclate 100 mg oral tablet  7/15/2011  2011  take 1 tablet (
100 mg) by oral route every 12 hours for 10 days   

 

 ibuprofen 800 mg oral tablet  2012  take 1 tablet by oral route 
3 times a day for 30 days   

 

 Bactrim -160 mg oral tablet  2012  take 1 tablet by oral 
route every 12 hours for 7 days   

 

 Macrobid 100 mg oral capsule  2016  take 1 capsule (100 mg) by 
oral route 2 times per day with food for 7 days   









 Discontinued 

 

 Name  Start Date  Discontinued Date  SIG  Comments

 

 atenolol 25 mg oral tablet     2011  take 1/2 tablet by oral route QD   

 

 Medrol (Antwan) 4 mg oral tablets,dose pack  2012  3/26/2012  take as 
directed   







Problem List







 Description  Status  Onset

 

 Prenatal Care, High Risk Pregnancy  Active  2010







Vital Signs







 Date  Time  BP-Sys(mm[Hg]  BP-Marisa(mm[Hg])  HR(bpm)  RR(rpm)  Temp  WT  HT  HC  
BMI  BSA  BMI Percentile  O2 Sat(%)

 

 2016  11:15:00 AM  130 mmHg  70 mmHg  80 bpm  20 rpm     214 lbs  64 in  
   36.73 kg/m2  2.09 m2      

 

 3/26/2012  10:30:00 AM  126 mmHg  62 mmHg  106 bpm  18 rpm  96.8 F  205 lbs  
64 in     35.1878 kg/m  2.0491 m     97 %

 

 2012  1:22:00 PM  120 mmHg  68 mmHg  66 bpm  18 rpm  97 F  202.375 lbs  
64 in     34.74 kg/m2  2.04 m2      

 

 2012  1:44:00 PM  122 mmHg  64 mmHg  68 bpm  18 rpm  98.2 F  198.125 lbs  
64 in     34.0077 kg/m  2.0145 m      

 

 1/3/2012  9:40:00 AM  103 mmHg  77 mmHg  64 bpm  18 rpm  97.7 F  197.125 lbs  
64 in     33.84 kg/m2  2.01 m2      

 

 2011  9:06:00 AM  118 mmHg  68 mmHg  74 bpm     97.4 F  189 lbs          
        

 

 7/15/2011  10:06:00 AM  126 mmHg  70 mmHg  73 bpm  20 rpm  97.9 F  180.5 lbs  
64 in     30.98 kg/m2  1.92 m2  0 %  99 %

 

 2011  8:56:00 AM  96 mmHg  68 mmHg  66 bpm  20 rpm  97.5 F  177.5 lbs  64 
in     30.4675 kg/m  1.9067 m  0 %  98 %

 

 12/15/2010  10:43:00 AM  104 mmHg  64 mmHg  48 bpm     97.8 F  150 lbs        
          

 

 2010  9:15:00 AM  112 mmHg  72 mmHg  49 bpm     98 F  149 lbs  64 in     
25.5755 kg/m  1.747 m  81.5 %   

 

 2010  8:54:00 AM  117 mmHg  65 mmHg  73 bpm  20 rpm  97.8 F  160.5 lbs   
               

 

 2010  9:42:00 AM  134 mmHg  66 mmHg  66 bpm        161 lbs               
   

 

 4/15/2010  4:02:00 PM  135 mmHg  71 mmHg  77 bpm        162 lbs  64 in     
27.81 kg/m2  1.82 m2  89.9 %   







Social History







 Name  Description  Comments

 

 History of tobacco usage     Quit smoking in January - was smoking 1-2cig/day

 

 denies alcohol use      

 

 Tobacco  Former smoker   







History of Procedures







 Date Ordered  Description  Order Status

 

 2011 12:00 AM  URINE PREGNANCY TEST  Reviewed

 

 2011 12:00 AM  IMMUNIZATION ADMIN  Reviewed

 

 2011 12:00 AM  HPV VACCINE 4 VALENT IM  Reviewed

 

 2016 12:00 AM  CYTOPATH C/V THIN LAYER  Returned

 

 2016 12:00 AM  SPECIMEN HANDLING OFFICE-LAB  Reviewed

 

 2016 12:00 AM  N.GONORRHOEAE DNA AMP PROB  Returned

 

 2016 12:00 AM  CHLAMYDIA CULTURE  Returned

 

 2016 12:00 AM  HIV-1ANTIBODY  Returned

 

 2016 12:00 AM  URINALYSIS AUTO W/SCOPE  Returned

 

 2016 12:00 AM  OBSTETRIC PANEL  Returned

 

 2016 12:00 AM  GLUCOSE TOLERANCE TEST (GTT)  Returned

 

 3/28/2016 12:00 AM  ALPHA-FETOPROTEIN SERUM  Returned

 

 3/28/2016 12:00 AM  CHORIONIC GONADOTROPIN TEST  Returned

 

 3/28/2016 12:00 AM  CHORIONIC GONADOTROPIN ASSAY  Returned

 

 5/3/2016 12:00 AM  OB US >/=14 WKS SNGL FETUS  Returned

 

 2016 12:00 AM  RH IG FULL-DOSE IM  Returned

 

 2016 12:00 AM  Type and screen  Returned

 

 2016 12:00 AM  GLUCOSE TOLERANCE TEST (GTT)  Returned

 

 2016 12:00 AM  COMPLETE CBC W/AUTO DIFF WBC  Returned

 

 2012 12:00 AM  X-RAY EXAM KNEE 4 OR MORE  Returned

 

 2016 12:00 AM  TDAP VACCINE 7 YRS/> IM  Reviewed

 

 2016 12:00 AM  IMMUNIZATION ADMIN  Reviewed

 

 2016 12:00 AM  CULTURE SCREEN ONLY  Returned

 

 4/15/2010 12:00 AM  OBSTETRIC PANEL  Reviewed

 

 4/15/2010 12:00 AM  HIV-1ANTIBODY  Reviewed

 

 4/15/2010 12:00 AM  URINALYSIS NONAUTO W/SCOPE  Reviewed

 

 4/15/2010 12:00 AM  OB US < 14 WKS SINGLE FETUS  Reviewed

 

 2010 12:00 AM  GLUCOSE TEST  Reviewed

 

 2010 12:00 AM  Rhogam  Reviewed

 

 2010 12:00 AM  Type and screen  Reviewed

 

 2010 12:00 AM  COMPLETE CBC W/AUTO DIFF WBC  Reviewed

 

 2010 12:00 AM  COMPLETE CBC W/AUTO DIFF WBC  Reviewed

 

 10/14/2010 12:00 AM  CULTURE OTHR SPECIMN AEROBIC  Reviewed

 

 12/15/2010 12:00 AM  INSERTION IMPLANON, IMPLANTABLE CONTRACEPTIVE CAPSULES  
Reviewed

 

 12/15/2010 12:00 AM  Etonogestrel (contraceptive) implant system, (Implanon)  
Reviewed

 

 7/15/2011 12:00 AM  CYTOPATH TBS C/V MANUAL  Reviewed

 

 7/15/2011 12:00 AM  CHLAMYDIA CULTURE  Reviewed

 

 7/15/2011 12:00 AM  N.GONORRHOEAE DNA AMP PROB  Reviewed

 

 7/15/2011 12:00 AM  URINE PREGNANCY TEST  Reviewed

 

 2011 12:00 AM  IMMUNIZATION ADMIN  Reviewed

 

 2011 12:00 AM  HPV VACCINE 4 VALENT IM  Reviewed







Results Summary







 Data and Description  Results

 

 2010 10:41 AM  RUBELLA 34.0 IU/mLCOLOR YELLOW APPEARANCE HAZY SPEC GRAV 
1.025 pH 7.0 PROTEIN TRACE GLUCOSE NEGATIVE KETONE 15 BILIRUBIN NEGATIVE BLOOD 
NEGATIVE NITRITE NEGATIVE LEUK SCREEN NEGATIVE CASTS/LPF NEGATIVE CRYSTALS 
TRACE AMORPH MUCOUS THRDS FEW BACTERIA NEGATIVE EPITH CELLS FEW SQUAMOUS 
TRICHOMONAS NEGATIVE YEAST NEGATIVE WBC 6.3 RBC 4.57 HGB 13.90 g/dLHCT 40.30 %
MCV 88.0 fLMCH 30.40 pgMCHC 34.50 g/dLMPV 9.20 fLPLT 258 %NEUT 65.10 %%LYMP 
28.30 %#NEUT 4.10 #LYMP 1.80 

 

 2010 3:48 PM  WBC 12.9 RBC 4.02 HGB 12.60 g/dLHCT 37.40 %MCV 93.0 fLMCH 
31.30 pgMCHC 33.70 g/dLRDW CV 14.10 %MPV 9.50 fLPLT 263 %NEUT 69.20 %%LYMP 
22.30 %%MONO 7.60 %%EOS 0.70 %%BASO 0.20 %#NEUT 8.91 #LYMP 2.88 #MONO 0.98 #EOS 
0.09 #BASO 0.03 

 

 10/22/2010 8:21 PM  COLOR YELLOW APPEARANCE CLEAR SPEC GRAV 1.010 pH 6.5 
PROTEIN NEGATIVE GLUCOSE NEGATIVE KETONE 15 BILIRUBIN NEGATIVE BLOOD NEGATIVE 
NITRITE NEGATIVE LEUK SCREEN SMALL CASTS/LPF NEGATIVE CRYSTALS NEGATIVE MUCOUS 
THRDS NEGATIVE BACTERIA 2++ EPITH CELLS 3+++ SQUAMOUS TRICHOMONAS NEGATIVE 
YEAST NEGATIVE 

 

 2010 11:58 PM  COLOR YELLOW APPEARANCE CLEAR SPEC GRAV 1.015 pH 6.5 
PROTEIN NEGATIVE GLUCOSE NEGATIVE KETONE NEGATIVE BILIRUBIN NEGATIVE BLOOD 
NEGATIVE NITRITE NEGATIVE LEUK SCREEN LARGE CASTS/LPF NEGATIVE CRYSTALS 
NEGATIVE MUCOUS THRDS NEGATIVE BACTERIA 2++ EPITH CELLS 2++ SQUAMOUS 
TRICHOMONAS NEGATIVE YEAST NEGATIVE 

 

 11/15/2010 5:30 PM  .0 IU/LURIC ACID 3.7 mg/dLGLUCOSE 66.0 mg/dLSODIUM 
137.0 mmol/LPOTASSIUM 3.10 mmol/LCHLORIDE 105.0 mmol/LCO2 21.0 mmol/LBUN 3.0 mg/
dLCREATININE 0.50 mg/dLSGOT/AST 20.0 IU/LSGPT/ALT 18.0 IU/LALK PHOS 152.0 IU/
LTOTAL PROTEIN 6.20 g/dLALBUMIN 3.40 g/dLTOTAL BILI 0.60 mg/dLCALCIUM 9.10 mg/
dLeGFR >60 mL/min/1.73 m2WBC 16.9 RBC 4.18 HGB 13.30 g/dLHCT 39.70 %MCV 95.0 
fLMCH 31.80 pgMCHC 33.50 g/dLRDW CV 14.0 %MPV 10.10 fLPLT 268 %NEUT 75.70 %%
LYMP 17.50 %%MONO 6.20 %%EOS 0.40 %%BASO 0.20 %#NEUT 12.82 #LYMP 2.97 #MONO 
1.05 #EOS 0.06 #BASO 0.03 

 

 11/15/2010 8:50 PM  PROTEIN UR 14.0 mg/dL

 

 2016 12:21 PM  Pap Smear Collected 

 

 2016 2:00 PM  WBC 9.8 RBC 4.60 HGB 13.60 g/dLHCT 41.40 %MCV 90.0 fLMCH 
29.60 pgMCHC 32.90 g/dLRDW CV 13.80 %MPV 9.70 fLPLT 271 %NEUT 59.80 %%LYMP 33.0 
%%MONO 6.40 %%EOS 0.60 %%BASO 0.20 %#NEUT 5.83 #LYMP 3.22 #MONO 0.62 #EOS 0.06 #
BASO 0.02 HIV AG/AB COMBO 0.14 RPR Non Reactive Rubella Antibodies, IgG 2.88 
Index

 

 2016 2:08 PM  COLOR YELLOW APPEARANCE CLEAR SPEC GRAV <=1.005 pH 6.0 
PROTEIN NEGATIVE GLUCOSE NEGATIVE mg/dLKETONE NEGATIVE BILIRUBIN NEGATIVE BLOOD 
NEGATIVE NITRITE NEGATIVE LEUK SCREEN TRACE CASTS/LPF NEGATIVE /LPFCRYSTALS 
NEGATIVE MUCOUS THRDS NEGATIVE BACTERIA NEGATIVE EPITH CELLS 3+++ SQUAMOUS /
HPFTRICHOMONAS NEGATIVE YEAST NEGATIVE 

 

 3/28/2016 2:18 PM  AFP Value 0.0240 ug/mLAFP MoM 1.02 hCG Value 45120.0 mIU/
mLhCG MoM 1.77 uE3 Value 0.70 ng/mLuE3 MoM 1.16 MARISA Value 207.520 pg/mLDIA MoM 
1.25 OSBR Risk       1 IN [address] DSR (Second Trimester) 1IN 4435 DSR (By Age)    
1 IN 1003 T18 Risk Not increased T18 (By Age) 1:3907 

 

 2016 3:15 PM  WBC 11.5 RBC 3.75 HGB 11.70 g/dLHCT 35.50 %MCV 95.0 fLMCH 
31.20 pgMCHC 33.0 g/dLRDW CV 14.10 %MPV 9.70 fLPLT 273 %NEUT 73.0 %%LYMP 20.10 %
%MONO 5.70 %%EOS 0.80 %%BASO 0.10 %#NEUT 8.40 #LYMP 2.31 #MONO 0.65 #EOS 0.09 #
BASO 0.01 







History Of Immunizations







 Name  Date Admin  Mfg Name  Mfg Code  Trade Name  Lot#  Route  Inj  Vis Given  
Vis Pub  CVX

 

 HPV  2011  [employer]  MSD  GARDASIL  [id number]  Intramuscular  Left 
Deltoid  2011  999

 

 HPV  2011  [employer]  MSD  GARDASIL  [id number]  Intramuscular  Left 
Deltoid  2011  62

 

 Tdap  2016  [employer]  SKB  BOOSTRIX  B4G4G  Intramuscular  Right 
Deltoid  2016  115







History of Past Illness







 Name  Date of Onset  Comments

 

 Atrial Septal Defect, Ostium Secundum Type      

 

 Prenatal Care, High Risk Pregnancy  2010   

 

    Apr 15 2010  4:29PM   

 

 Prenatal Care, High Risk Pregnancy  Apr 15 2010  4:13PM   

 

    2010  9:54AM   

 

 Prenatal Care, High Risk Pregnancy  2010 10:10AM   

 

    May 13 2010  9:01AM   

 

    John 10 2010  2:45PM   

 

    2010  4:16PM   

 

 Pregnancy, First Normal  Aug 30 2010  9:14AM   

 

 Pregnancy, High Risk  Sep 13 2010  3:13PM   

 

 Group B Strep Screening,   Oct 14 2010  5:43PM   

 

 Pregnancy, High Risk  Oct 14 2010  5:43PM   

 

 Postoperative Examination Following Surgery  Dec  6 2010  9:15AM   

 

 IMPLANON  Insertion  Dec 15 2010 10:44AM   

 

 General Medical Exam, Child  2011  8:58AM   

 

 Contraceptive Management  2011  8:58AM   

 

 Routine gynecological examination  Jul 15 2011 10:03AM   

 

 Contraceptive Counseling  Jul 15 2011 10:03AM   

 

 Vaginal Discharge  Jul 15 2011 10:03AM   

 

 Irregular Menses  Jul 15 2011 10:03AM   

 

 Gardsil (HPV)  2011  8:54AM   

 

 Irregular Menses  Aug 31 2011  9:07AM   

 

 Family Planning  Aug 31 2011  9:07AM   

 

 Contraceptive Management  Andreas  3 2012  9:43AM   

 

 Local Infection  Andreas  3 2012  9:43AM   

 

 Well Child Examination  2012  1:46PM   

 

 Right knee pain  2012  1:46PM   

 

 Right knee pain  2012  1:24PM   

 

 Abscess  2012  1:24PM   

 

 Right knee pain  Mar 26 2012 10:32AM   

 

 General Medical Exam, Adult  2012  1:46PM   

 

 Pregnancy test confirmed positive  2016 11:37AM   

 

 Obesity  2016 11:37AM   

 

 Normal pregnancy in multigravida in second trimester  Mar 28 2016  2:01PM   

 

 History of atrial septal defect repair  May 11 2016  8:50AM   

 

 Normal pregnancy in multigravida in second trimester  2016  2:00PM   

 

 Need for Tdap vaccine  2016  1:38PM   

 

 Group B Strep Screening,   Aug 23 2016  3:43PM   

 

 Normal pregnancy in multigravida in third trimester  Aug 23 2016  3:43PM   







Payers







 Insurance Name  Company Name  Plan Name  Plan Number  Policy Number  Policy 
Group Number  Start Date

 

    [facility]  
   [id number]     N/A

 

    [employer] [employer]  [employer]     
[id number]     N/A

 

    zzzTest Medicare A  Test Medicare A     void     N/A







History of Encounters







 Visit Date  Visit Type  Provider

 

 2016  Office visit  Dr. Antonella Ventura MD

 

 2016  Office visit  Dr. Antonella Ventura MD

 

 2016  Office visit  Dr. Antonella Ventura MD

 

 2016  Office visit  Dr. Antonella Ventura MD

 

 2016  Office visit  Dr. Antonella Ventura MD

 

 2016  Office visit  Dr. Antonella Ventura MD

 

 3/28/2016  Office visit  Dr. Antonella Ventura MD

 

 2016  Office visit  Dr. Antonella Ventura MD

 

 2016  Office visit  Dr. Antonella Ventura MD

 

 2016  Office visit  Velma MANCynthia Mendieta APRN

 

 3/26/2012  Office visit  Ivanna Walker APRN

 

 2012  Office visit  Ivanna Walker APRN

 

 2012  Office visit  Ivanna Walker APRN

 

 1/3/2012  Office visit  Ivanna Walker APRN

 

 2011  Nurse visit  Ivanna Walker APRN

 

 2011  Nurse visit  Ivanna Walker APRN

 

 7/15/2011  Office visit  Ivanna Walker APRN

 

 2011  Office visit  Ivanna Beny APRN

 

 12/15/2010  Office visit  Willard Sorensen MD

 

 2010  Surgery  Willard Sorensen MD

 

 2010  [facility]  Willard Sorensen MD

 

 11/15/2010  Office visit  Willard Sorensen MD

 

 11/15/2010  [facility]  Willard Sorensen MD

 

 2010  Office visit  Willard Sorensen MD

 

 2010  Office visit  Willard Sorensen MD

 

 10/28/2010  Office visit  Willard Sorensen MD

 

 10/21/2010  Office visit  Willard Sorensen MD

 

 10/14/2010  Office visit  Willard Sorensen MD

 

 10/9/2010  [facility]  Willard Sorensen MD

 

 10/7/2010  Office visit  Willard Sorensen MD

 

 2010  Office visit  Willard Sorensen MD

 

 2010  Office visit  Willard Sorensen MD

 

 2010  Office visit  Willard Sorensen MD

 

 2010  Office visit  Willard Sorensen MD

 

 2010  Office visit  Willard Sorensen MD

 

 6/10/2010  Office visit  Willard Sorensen MD

 

 2010  Office visit  Willard Sorensen MD

 

 2010  Office visit  Willard Sorensen MD

 

 4/15/2010  Office visit  Willard Sorensen MD

## 2019-04-28 NOTE — DIAGNOSTIC IMAGING REPORT
INDICATION:  Right flank pain and difficulty voiding x1 day.



TECHNIQUE:  Single view chest with supine and upright radiographs

of the abdomen.



CORRELATION STUDY: None



FINDINGS: 

Frontal radiograph of the chest demonstrates no acute

abnormality. Likely atrial septal device over the right heart.



Supine and upright radiographs of the abdomen demonstrates the

bowel gas pattern to be unremarkable and without evidence for

obstruction. No free air is seen under the diaphragms.

Calcification most likely largely vasculature. Rightward

curvature lumbar spine.



IMPRESSION:

1. Negative for acute cardiopulmonary abnormality.

2. Unremarkable appearing bowel gas pattern.



Dictated by: 



  Dictated on workstation # [id number]

## 2019-04-28 NOTE — DIAGNOSTIC IMAGING REPORT
PROCEDURE: CT urinary tract, rule out kidney stone.



TECHNIQUE: Multiple contiguous axial images were obtained through

the abdomen and pelvis without the use of intravenous contrast.

Auto Exposure Controls were utilized during the CT exam to meet

ALARA standards for radiation dose reduction. 



INDICATION:  Right flank pain.



Study compared 05/20/2018.



FINDINGS: A 1.7 mm stone is at the level of the right

ureterovesical junction resulting in a moderate severity of

upstream right-sided hydroureteronephrosis. No urinoma or

perinephric fluid collection. There is a punctate 2 mm

nonobstructing stone within left lower pole calyces. No

additional right-sided stone burden found. There is physiologic

follicle in the left ovary. The uterus retroflexed unremarkable.

There is no bowel obstruction. The liver, spleen, adrenals,

pancreas and gallbladder all unremarkable.



IMPRESSION: Small stone right UVJ results in a moderate severity

of right-sided hydroureteronephrosis with nonobstructing

left-sided calculus disease noted. No other significant finding.



Dictated by: 



  Dictated on workstation # [id number]

## 2019-04-28 NOTE — ED ABDOMINAL PAIN
General


Stated Complaint:  THROWING UP, STABBING PAIN IN RIGHT SIDE


Source of Information:  Patient





History of Present Illness


Date Seen by Provider:  2019


Time Seen by Provider:  00:55


Initial Comments


PT ARRIVES VIA POV


STATES SHE THINKS SHE IS PASSING A KIDNEY STONE


HAS HAD KIDNEY STONE IN THE PAST, BUT HAS PASSED ON HER OWN, NO SURGERY REQUIRED


HAS NEVER SEEN A UROLOGIST


PT STATES WHILE SHE WAS AT WORK TONIGHT, AROUND 2100, SHE BEGAN TO HAVE NAUSEA/

VOMITING/DIARRHEA, DIFFICULTY URINATING WITH MUCH URGENCY BUT CANNOT VOID, AND 

SHARP RIGHT SIDED ABDOMINAL AND FLANK PAIN 


PAIN IS WORSE IF STANDING FOR LONG PERIODS OF TIME


HAS NOT TAKEN ANYTHING FOR PAIN





VOMITED X 1


DIARRHEA X 4





NO FEVER





LMP FIRST , ON OCP'S





PCP: KARINA LA





Allergies and Home Medications


Allergies


Coded Allergies:  


     No Known Drug Allergies (Unverified , 18)





Home Medications


Hydrocodone Bit/Acetaminophen 1 Ea Tablet, 1 EA PO 4 times a day


   Prescribed by: MELVIN IVEY on 18 1130


Hydrocodone/Ibuprofen 1 Each Tablet, 1 EACH PO Q4H


   Prescribed by: STEPHANIE RÍOS on 19


Nitrofurantoin Monohyd/M-Cryst 100 Mg Capsule, 100 MG PO BID


   Prescribed by: STEPHANIE RÍOS on 19


Ondansetron 4 Mg Tab.rapdis, 4 MG PO Q4H


   Prescribed by: STEPHANIE RÍOS on 19


Tamsulosin HCl 0.4 Mg Cap, 0.4 MG PO DAILY


   Prescribed by: STEPHANIE RÍOS on 19





Patient Home Medication List


Home Medication List Reviewed:  Yes





Review of Systems


Review of Systems


Constitutional:  no symptoms reported; No fever


Respiratory:  No Symptoms Reported


Cardiovascular:  No Symptoms Reported


Gastrointestinal:  See HPI, Abdominal Pain, Nausea, Vomiting


Genitourinary:  See HPI, Flank Pain, Urgency


Musculoskeletal:  see HPI, back pain


Skin:  no symptoms reported


Psychiatric/Neurological:  No Symptoms Reported


Endocrine:  No Symptoms Reported


Hematologic/Lymphatic:  No Symptoms Reported





Past Medical-Social-Family Hx


Patient Social History


Alcohol Use:  Occasionally Uses


Recreational Drug Use:  No


Smoking Status:  Former Smoker (/2 PPD, QUIT )


Type Used:  Cigarettes


Recent Foreign Travel:  No


Contact w/Someone Who Travel:  No





Past Medical History


Surgeries:  Yes ( X 3; PT HAS A DEVICE IN HER HEART--NOTED ON XRAY AND 

CT, BUT PT WAS UNAWARE OF PRIOR HEART SURGERY OR ANY HEART PROBLEMS; BIRTHMARK 

REMOVED FROM LEFT NECK)


Cardiac,  Section


Respiratory:  No


Cardiac:  No (PT WAS UNAWARE OF ANY HEART PROBLEMS OR HEART SURGERY, BUT A 

MEDICAL DEVICE IS PRESENT IN HER HEART --ON XRAY AND CT. )


Neurological:  No


Pregnant:  No


Female Reproductive Disorders:  Denies


Genitourinary:  Yes


Kidney Stones


Gastrointestinal:  No


Musculoskeletal:  No


Endocrine:  No


HEENT:  No


Cancer:  No


Psychosocial:  No


Integumentary:  No


Blood Disorders:  No





Physical Exam


Vital Signs





Vital Signs - First Documented








 19





 00:50 03:18


 


Temp 98.0 


 


Pulse 77 


 


Resp 18 


 


B/P (MAP) 133/74 (93) 


 


Pulse Ox  98





Capillary Refill :


Height/Weight/BMI


Height: 5'4.00"


Weight: 220lbs. oz. 99.240565tz; 35.15 BMI


Method:Stated


General Appearance:  WD/WN, no apparent distress, other (FLAT AFFECT. LAYING 

OUTSTRETCHED ON HER RIGHT SIDE, HEAD PROPPED UP ON WITH HER RIGHT HAND. DOES 

NOT APPEAR TO BE IN ANY DISCOMFORT. )


Respiratory:  normal breath sounds, other (NO SURGICAL SCARS TO CHEST OR BACK)


Cardiovascular:  regular rate, rhythm, no murmur


Gastrointestinal:  soft, tenderness (RIGHT FLANK, RIGHT MID ABDOMEN AND RLQ 

TENDERNESS)


Extremities:  normal inspection


Back:  CVA tenderness (R)


Neurologic/Psychiatric:  CNs II-XII nml as tested, no motor/sensory deficits, 

alert, oriented x 3, other (VERY FLAT AFFECT, SLIGHTLY SLOW MENTATION)


Skin:  normal color, warm/dry; No rash





Progress/Results/Core Measures


Results/Orders


Lab Results





Laboratory Tests








Test


 19


00:52 19


01:08 19


01:49 Range/Units


 


 


Urine Color YELLOW     


 


Urine Clarity CLEAR     


 


Urine pH 5    5-9  


 


Urine Specific Gravity 1.030 H   1.016-1.022  


 


Urine Protein 2+ H   NEGATIVE  


 


Urine Glucose (UA) NEGATIVE    NEGATIVE  


 


Urine Ketones 3+ H   NEGATIVE  


 


Urine Nitrite NEGATIVE    NEGATIVE  


 


Urine Bilirubin NEGATIVE    NEGATIVE  


 


Urine Urobilinogen 1    NORMAL  MG/DL


 


Urine Leukocyte Esterase 1+ H   NEGATIVE  


 


Urine RBC (Auto) 5+ H   NEGATIVE  


 


Urine RBC 5-10 H    /HPF


 


Urine WBC NONE     /HPF


 


Urine Squamous Epithelial


Cells 5-10 


 


 


  /HPF





 


Urine Crystals PRESENT H    /LPF


 


Urine Calcium Oxalate Crystals FEW H    /LPF


 


Urine Bacteria TRACE     /HPF


 


Urine Casts NONE     /LPF


 


Urine Mucus SMALL H    /LPF


 


Urine Culture Indicated NO     


 


White Blood Count


 


 12.8 H


 


 4.3-11.0


10^3/uL


 


Red Blood Count


 


 4.62 


 


 4.35-5.85


10^6/uL


 


Hemoglobin  14.1   11.5-16.0  G/DL


 


Hematocrit  42   35-52  %


 


Mean Corpuscular Volume  91   80-99  FL


 


Mean Corpuscular Hemoglobin  31   25-34  PG


 


Mean Corpuscular Hemoglobin


Concent 


 34 


 


 32-36  G/DL





 


Red Cell Distribution Width  13.5   10.0-14.5  %


 


Platelet Count


 


 290 


 


 130-400


10^3/uL


 


Mean Platelet Volume  9.5   7.4-10.4  FL


 


Neutrophils (%) (Auto)  80 H  42-75  %


 


Lymphocytes (%) (Auto)  15   12-44  %


 


Monocytes (%) (Auto)  4   0-12  %


 


Eosinophils (%) (Auto)  1   0-10  %


 


Basophils (%) (Auto)  0   0-10  %


 


Neutrophils # (Auto)  10.3 H  1.8-7.8  X 10^3


 


Lymphocytes # (Auto)  1.9   1.0-4.0  X 10^3


 


Monocytes # (Auto)  0.5   0.0-1.0  X 10^3


 


Eosinophils # (Auto)


 


 0.1 


 


 0.0-0.3


10^3/uL


 


Basophils # (Auto)


 


 0.0 


 


 0.0-0.1


10^3/uL


 


Sodium Level  141   135-145  MMOL/L


 


Potassium Level  3.4 L  3.6-5.0  MMOL/L


 


Chloride Level  105     MMOL/L


 


Carbon Dioxide Level  23   21-32  MMOL/L


 


Anion Gap  13   5-14  MMOL/L


 


Blood Urea Nitrogen  11   7-18  MG/DL


 


Creatinine


 


 0.77 


 


 0.60-1.30


MG/DL


 


Estimat Glomerular Filtration


Rate 


 > 60 


 


  





 


BUN/Creatinine Ratio  14    


 


Glucose Level  132 H    MG/DL


 


Calcium Level  10.1   8.5-10.1  MG/DL


 


Corrected Calcium  9.8   8.5-10.1  MG/DL


 


Total Bilirubin  0.5   0.1-1.0  MG/DL


 


Aspartate Amino Transf


(AST/SGOT) 


 14 


 


 5-34  U/L





 


Alanine Aminotransferase


(ALT/SGPT) 


 12 


 


 0-55  U/L





 


Alkaline Phosphatase  63     U/L


 


Total Protein  7.7   6.4-8.2  GM/DL


 


Albumin  4.4   3.2-4.5  GM/DL


 


Amylase Level  54     U/L


 


Lipase  25   8-78  U/L


 


Serum Pregnancy Test,


Qualitative 


 NEGATIVE 


 


 NEGATIVE  





 


Urine Opiates Screen   NEGATIVE  NEGATIVE  


 


Urine Oxycodone Screen   NEGATIVE  NEGATIVE  


 


Urine Methadone Screen   NEGATIVE  NEGATIVE  


 


Urine Propoxyphene Screen   NEGATIVE  NEGATIVE  


 


Urine Barbiturates Screen   NEGATIVE  NEGATIVE  


 


Ur Tricyclic Antidepressants


Screen 


 


 NEGATIVE 


 NEGATIVE  





 


Urine Phencyclidine Screen   NEGATIVE  NEGATIVE  


 


Urine Amphetamines Screen   NEGATIVE  NEGATIVE  


 


Urine Methamphetamines Screen   NEGATIVE  NEGATIVE  


 


Urine Benzodiazepines Screen   NEGATIVE  NEGATIVE  


 


Urine Cocaine Screen   NEGATIVE  NEGATIVE  


 


Urine Cannabinoids Screen   NEGATIVE  NEGATIVE  








My Orders





Orders - STEPHANIE RÍOS DO


Ct Abd/Pelvis Wo(Kidney Stone) (19 00:55)


Amylase (19 00:55)


Cbc With Automated Diff (19 00:55)


Comprehensive Metabolic Panel (19 00:55)


Lipase (19 00:55)


Urinalysis (19 00:55)


Acute Abd Series (19 00:55)


Ed Iv/Invasive Line Start (19 00:55)


Drug Screen Stat (Urine) (19 00:55)


Hcg,Qualitative Serum (19 00:55)


Ed Iv/Invasive Line Start (19 00:55)


Lactated Ringers (Lr 1000 Ml Iv Solution (19 00:55)


Ondansetron Injection (Zofran Injectio (19 01:00)


Ketorolac Injection (Toradol Injection) (19 01:45)


Tamsulosin Capsule (Flomax Capsule) (19 03:10)


Rx-Hydrocodone/Apap 5-325 Mg (Rx-Vicodin (19 03:15)


Nitrofurantoin Capsule,Macro (Macrobid C (19 03:15)


Ketorolac Injection (Toradol Injection) (19 02:46)





Medications Given in ED





Current Medications








 Medications  Dose


 Ordered  Sig/Elaine


 Route  Start Time


 Stop Time Status Last Admin


Dose Admin


 


 Acetaminophen/


 Hydrocodone Bitart  1 ea  Q4H  PRN


 PO  4/28/19 03:15


 19 03:21 DC 19 03:17


1 EA


 


 Ketorolac


 Tromethamine  30 mg  ONCE  ONCE


 IVP  19 01:45


 19 03:21 DC 19 02:55


30 MG


 


 Lactated Ringer's  1,000 ml @ 


 0 mls/hr  Q0M ONCE


 IV  19 00:55


 19 00:58 DC 19 01:20


999 MLS/HR


 


 Nitrofurantoin


 Macrocrystals  100 mg  ONCE  ONCE


 PO  19 03:15


 19 03:16 DC 19 03:15


100 MG


 


 Ondansetron HCl  4 mg  ONCE  ONCE


 IVP  19 01:00


 19 01:01 DC 19 01:20


4 MG


 


 Tamsulosin HCl  0.4 mg  DAILY@1800  ONCE


 PO  19 03:10


 19 03:11 DC 19 03:15


0.4 MG








Vital Signs/I&O











 19





 00:50 03:18


 


Temp 98.0 98.0


 


Pulse 77 70


 


Resp 18 18


 


B/P (MAP) 133/74 (93) 119/83 (95)


 


Pulse Ox  98











Progress


Progress Note :  


Progress Note


PAIN AND NAUSEA EASED WITH MEDICATIONS





PT WAS ADVISED TO QUESTION HER PARENTS REGARDING PRIOR HEART SURGERY, AS SHE 

WAS UNAWARE THAT SHE HAS HAD ANY HEART PROBLEMS OR EVER HAD HEART SURGERY.





Diagnostic Imaging





Comments


KUB--NO ACUTE PROCESS, PENDING RADIOLOGIST REVIEW





CT ABDOMEN/ PELVIS--2 MM STONE IN RIGHT UVJ, WITH SEVERE DILATION OF RIGHT 

RENAL COLLECTING SYSTEM, PER [facility] VIA FAX @ 4828


   Reviewed:  Reviewed by Me





Departure


Impression





 Primary Impression:  


 Right distal ureteral calculus


Disposition:   HOME, SELF-CARE


Condition:  Improved





Departure-Patient Inst.


Referrals:  


NO,LOCAL PHYSICIAN (PCP)


Primary Care Physician








TAWIL,ELIAS A MD


Patient Instructions:  How to Strain Your Urine, Kidney Stones (DC)





Add. Discharge Instructions:  


LOTS OF CLEAR LIQUIDS





STRAIN ALL URINE--RETURN ANY STONES TO DR. TAWIL'S OFFICE





FOLLOW UP WITH DR. TAWIL NEXT WEEK FOR FURTHER CARE


Scripts


Tamsulosin HCl (Flomax) 0.4 Mg Cap


0.4 MG PO DAILY, #10 CAP


   Prov: STEPHANIE RÍOS DO         19 


Ondansetron (Ondansetron Odt) 4 Mg Tab.rapdis


4 MG PO Q4H for Nausea/Vomiting, #10 TAB


   Prov: STEPHANIE RÍOS DO         19 


Hydrocodone/Ibuprofen (Hydrocodone-Ibuprofen 7.5-200) 1 Each Tablet


1 EACH PO Q4H for Pain MDD 6, #20 TAB


   Prov: STEPHANIE RÍOS DO         19 


Nitrofurantoin Monohyd/M-Cryst (Macrobid 100 mg Capsule) 100 Mg Capsule


100 MG PO BID, #20 CAP


   Prov: STEPHANIE RÍOS DO         19











STEPHANIE RÍOS DO 2019 01:16

## 2020-02-25 NOTE — DIAGNOSTIC IMAGING REPORT
INDICATION: Injury to the right ankle with pain.



TECHNIQUE: 3 views of the right ankle.



COMPARISON: None



FINDINGS:



No acute fracture or dislocation is seen in the right ankle.

Alignment appears normal. Joint spaces are preserved. Ankle

mortise is symmetric and the talar dome is intact. There is mild

lateral soft tissue swelling.



IMPRESSION:

1. Mild lateral soft tissue swelling of the right ankle with no

acute osseous abnormality seen.



Dictated by: 



  Dictated on workstation # [id number]

## 2020-02-25 NOTE — ED LOWER EXTREMITY
General


Chief Complaint:  Lower Extremity


Stated Complaint:  R ANKLE PAIN


Nursing Triage Note:  


ARRIVED VIA AMB TO ROOM 10.  STATES SHE FELL LAST NIGHT GETTING INTO HER TRUCK 


HURTING HER RIGHT ANKLE AND TAILBONE.


Nursing Sepsis Screen:  No Definite Risk


Source:  patient


Exam Limitations:  no limitations





History of Present Illness


Date Seen by Provider:  2020


Time Seen by Provider:  09:00


Initial Comments


Here with report of right ankle pain and tailbone pain after falling out of her 

truck yesterday evening. She was able to walk and okay. Complains of pain to 

both areas. She did take ibuprofen yesterday but has not had anything today. 

Denies other injury or concerns.


Onset:  yesterday


Severity:  mild


Pain/Injury Location:  right ankle; bilateral other (, tailbone)


Method of Injury:  fell


Modifying Factors:  Improves With Immobilization; Worse With Movement; Improves 

With Rest





Allergies and Home Medications


Allergies


Coded Allergies:  


     No Known Drug Allergies (Unverified , 18)





Patient Home Medication List


Home Medication List Reviewed:  Yes





Review of Systems


Constitutional:  see HPI; No chills, No fever


Respiratory:  no symptoms reported


Cardiovascular:  no symptoms reported


Musculoskeletal:  see HPI, joint pain, muscle pain


Skin:  No change in color, No lesions





Past Medical-Social-Family Hx


Past Med/Social Hx:  Reviewed Nursing Past Med/Soc Hx


Patient Social History


Alcohol Use:  Occasionally Uses


Recreational Drug Use:  No


Smoking Status:  Never a Smoker


Type Used:  Cigarettes


Recent Foreign Travel:  No


Contact w/Someone Who Travel:  No


Recent Infectious Disease Expo:  No


Recent Hopitalizations:  No





Seasonal Allergies


Seasonal Allergies:  No





Past Medical History


Surgeries:  Yes


Cardiac,  Section


Respiratory:  No


Cardiac:  No


Neurological:  No


Female Reproductive Disorders:  Denies


Genitourinary:  Yes


Kidney Stones


Gastrointestinal:  No


Musculoskeletal:  No


Endocrine:  No


HEENT:  No


Cancer:  No


Psychosocial:  No


Integumentary:  No


Blood Disorders:  No





Family Medical History


Reviewed Nursing Family Hx





Physical Exam


Vital Signs





Vital Signs - First Documented








 20





 08:40


 


Temp 36.6


 


Pulse 72


 


Resp 16


 


B/P (MAP) 129/83 (98)


 


Pulse Ox 98


 


O2 Delivery Room Air





Capillary Refill : Less Than 3 Seconds


Height, Weight, BMI


Height: 5'4.00"


Weight: 195lbs. oz. 88.192334ld; 32.00 BMI


Method:Stated


General Appearance:  WD/WN, no apparent distress


Cardiovascular:  regular rate, rhythm, no murmur


Respiratory:  lungs clear, normal breath sounds


Back:  no vertebral tenderness, other (tender [place] bone.)


Ankles:  left ankle non-tender, left ankle normal inspection; bilateral ankle 

normal range of motion; right ankle pain (lateral aspect), right ankle soft 

tissue tenderness (lateral aspect), right ankle swelling (mild lateral aspect)


Neurologic/Psychiatric:  alert, oriented x 3


Skin:  normal color, warm/dry; No ecchymosis





Progress/Results/Core Measures


Results/Orders


My Orders





Orders - SIMON HICKS MD


Ankle, Right, 3 Views (20 09:06)


Sacrum And Coccyx (20 09:06)


Ibuprofen  Tablet (Motrin  Tablet) (20 09:07)





Vital Signs/I&O











 20





 08:40


 


Temp 36.6


 


Pulse 72


 


Resp 16


 


B/P (MAP) 129/83 (98)


 


Pulse Ox 98


 


O2 Delivery Room Air














Blood Pressure Mean:                    98











Progress


Progress Note :  


Progress Note


Seen and evaluated. X-ray right ankle and sacrum/coccyx. Ibuprofen 800 mg by 

mouth. Monitor patient. 0955: No acute findings. Ace wrap to right ankle. 

Discharged home with return precautions. Patient verbalize understanding 

instructions and agreement to plan





Diagnostic Imaging





   Diagonstic Imaging:  Xray


   Plain Films/CT/US/NM/MRI:  other


Comments


                 [facility], [place]





NAME:   BLANCA GUTIÉRREZ


MED REC#:   [id number]


ACCOUNT#:   [id number]


PT STATUS:   REG ER


:   1990


PHYSICIAN:   SIMON HICKS MD


ADMIT DATE:   20/ER


                                   ***Draft***


Date of Exam:20





SACRUM AND COCCYX








INDICATION: Fall with sacrum/coccyx injury.





TECHNIQUE: 3 views of the sacrum/coccyx.





COMPARISON: CT from 2019





FINDINGS:





No acute fracture is seen in the sacrum or coccyx. There is


slight S-shaped angulation of the coccyx, which appears stable


compared to 2019. Alignment otherwise appears normal.


Phleboliths are seen in the pelvis.





IMPRESSION:


1. No acute osseous abnormality is seen in the sacrum/coccyx.





  Dictated on workstation # [id number]








Dict:   20 0939


Trans:   20 0941


 [id number]





Interpreted by:     JUAN JOSE SCHUMACHER MD


Electronically signed by:








   [facility]:  Xray


   Plain Films/CT/US/NM/MRI:  other


Comments


                 [facility], [place]





NAME:   BLANCA GUTIÉRREZ


MED REC#:   [id number]


ACCOUNT#:   [id number]


PT STATUS:   REG ER


:   1990


PHYSICIAN:   SIMON HICKS MD


ADMIT DATE:   20/ER


                                   ***Draft***


Date of Exam:20





ANKLE, RIGHT, 3 VIEWS








INDICATION: Injury to the right ankle with pain.





TECHNIQUE: 3 views of the right ankle.





COMPARISON: None





FINDINGS:





No acute fracture or dislocation is seen in the right ankle.


Alignment appears normal. Joint spaces are preserved. Ankle


mortise is symmetric and the talar dome is intact. There is mild


lateral soft tissue swelling.





IMPRESSION:


1. Mild lateral soft tissue swelling of the right ankle with no


acute osseous abnormality seen.





  Dictated on workstation # [id number]








Dict:   20 0935


Trans:   20 0937


 [id number]





Interpreted by:     JUAN JOSE SCHUMACHER MD


Electronically signed by:





Departure


Impression





   Primary Impression:  


   Right ankle sprain


   Qualified Codes:  S93.401A - Sprain of unspecified ligament of right ankle, 

   initial encounter


   Additional Impression:  


   Coccygeal contusion


   Qualified Codes:  S30.0XXA - Contusion of lower back and pelvis, initial 

   encounter


Disposition:  01 HOME, SELF-CARE


Condition:  Improved





Departure-Patient Inst.


Decision time for Depature:  09:58


Referrals:  


NO,LOCAL PHYSICIAN (PCP/Family)


Primary Care Physician


Patient Instructions:  Coccyx Injury (DC), Ankle Sprain (DC)





Add. Discharge Instructions:  








All discharge instructions reviewed with patient and/or family. Voiced 

understanding.





You may continue ibuprofen 600 mg every 8 hours as needed for pain. You may use 

Tylenol/acetaminophen 1000 mg every 8 hours as needed for pain. Use ice packs to

areas of concern 20 minutes per hour as needed. You can consider getting a donut

pillow to sit on to decrease discomfort to your tailbone. You can purchase this 

at local pharmacies. Follow-up with your Dr. in a few days for recheck. Return 

for worse pain, fever, vomiting, weakness, breathing problems or other concerns 

as needed.











SIMON HICKS MD          2020 09:40

## 2021-05-21 NOTE — ED BACK PAIN
General


Chief Complaint:  Back Problems


Stated Complaint:  ARM/LEG NUMBNESS /BACK PAIN


Source of Information:  Patient


Exam Limitations:  No Limitations





History of Present Illness


Date Seen by Provider:  May 21, 2021


Time Seen by Provider:  18:43


Initial Comments


 to ER with mid back pain that started about 1 hour ago while she was ended a 

hard chair and a CNA class she felt spasm.  About 15 minutes later she developed

some numbness to her left arm and her left leg.  No headache.  No anticoagulant 

use.  No injury.


Location:  Paraspinous Muscles, T-Spine


Timing/Duration:  1-2 Days


Severity:  Moderate


Pain/Injury Location:  Back


Associated Symptoms:  denies symptoms





Allergies and Home Medications


Allergies


Coded Allergies:  


     No Known Drug Allergies (Unverified , 18)





Patient Home Medication List


Home Medication List Reviewed:  Yes





Review of Systems


Constitutional:  see HPI


EENTM:  see HPI


Respiratory:  no symptoms reported


Cardiovascular:  no symptoms reported


Genitourinary:  no symptoms reported


Musculoskeletal:  see HPI


Skin:  no symptoms reported


Psychiatric/Neurological:  No Symptoms Reported





Past Medical-Social-Family Hx


Patient Social History


Type Used:  Cigarettes


Recent Hopitalizations:  No





Seasonal Allergies


Seasonal Allergies:  No





Past Medical History


Surgeries:  Yes


Cardiac,  Section


Respiratory:  No


Cardiac:  No


Neurological:  No


Female Reproductive Disorders:  Denies


Genitourinary:  Yes


Kidney Stones


Gastrointestinal:  No


Musculoskeletal:  No


Endocrine:  No


HEENT:  No


Cancer:  No


Psychosocial:  No


Integumentary:  No


Blood Disorders:  No





Physical Exam


Vital Signs





Vital Signs - First Documented








 21





 18:42


 


Temp 35.4


 


Pulse 93


 


Resp 18


 


B/P (MAP) 139/91 (107)


 


Pulse Ox 99


 


O2 Delivery Room Air





Capillary Refill :


Height, Weight, BMI


Height: 5'4.00"


Weight: 195lbs. oz. 88.581445by; 32.00 BMI


Method:Stated


General Appearance:  No Apparent Distress, WD/WN


Respiratory:  No Accessory Muscle Use, No Respiratory Distress


Gastrointestinal:  Normal Bowel Sounds, Non Tender, Soft


Extremity:  Normal Capillary Refill, Normal Inspection


Neurologic/Psychiatric:  Alert, Oriented x3


Skin:  Normal Color, Warm/Dry


 equal, radial pulses +2, NIH 0. No drift. Only sensory loss, no motor 

loss.





Progress/Results/Core Measures


Results/Orders


Lab Results





Laboratory Tests








Test


 21


19:05 Range/Units


 


 


Urine Color YELLOW   


 


Urine Clarity CLEAR   


 


Urine pH 6.5  5-9  


 


Urine Specific Gravity <=1.005  1.016-1.022  


 


Urine Protein NEGATIVE  NEGATIVE  


 


Urine Glucose (UA) NEGATIVE  NEGATIVE  


 


Urine Ketones NEGATIVE  NEGATIVE  


 


Urine Nitrite NEGATIVE  NEGATIVE  


 


Urine Bilirubin NEGATIVE  NEGATIVE  


 


Urine Urobilinogen 0.2  < = 1.0  MG/DL


 


Urine Leukocyte Esterase NEGATIVE  NEGATIVE  


 


Urine RBC (Auto) 3+ H NEGATIVE  


 


Urine RBC RARE   /HPF


 


Urine WBC 0-2   /HPF


 


Urine Squamous Epithelial


Cells 5-10 


  /HPF





 


Urine Crystals NONE   /LPF


 


Urine Bacteria NEGATIVE   /HPF


 


Urine Casts NONE   /LPF


 


Urine Mucus NEGATIVE   /LPF


 


Urine Culture Indicated NO   








My Orders





Orders - RAMON NIETO


Ketorolac Injection (Toradol Injection) (21 18:45)


Orphenadrine Inj (Ed Only) (Norflex Inje (21 18:45)


Ua Culture If Indicated (21 19:06)


Hydrocodone/Apap 5/325 Tablet (Lortab 5 (21 20:00)


Urine Pregnancy Bedside (21 19:48)





Medications Given in ED





Current Medications








 Medications  Dose


 Ordered  Sig/Elaine


 Route  Start Time


 Stop Time Status Last Admin


Dose Admin


 


 Acetaminophen/


 Hydrocodone Bitart  1 ea  ONCE  ONCE


 PO  21 20:00


 21 20:01 DC 21 19:52


1 EA


 


 Ketorolac


 Tromethamine  60 mg  ONCE  ONCE


 IM  21 18:45


 21 18:46 DC 21 19:08


60 MG


 


 Orphenadrine


 Citrate  60 mg  ONCE  ONCE


 IM  21 18:45


 21 18:46 DC 21 19:08


60 MG








Vital Signs/I&O











 21





 18:42


 


Temp 35.4


 


Pulse 93


 


Resp 18


 


B/P (MAP) 139/91 (107)


 


Pulse Ox 99


 


O2 Delivery Room Air











Departure


Communication (Admissions)


1948-the arm and leg pain and numbness are gone.  However she still has a 

persistent pain in the middle of her back.  It does not radiate through to the 

abdomen.  It is midline and feels tight she states.





Impression





   Primary Impression:  


   Thoracic back pain


Disposition:   HOME, SELF-CARE


Condition:  Stable





Departure-Patient Inst.


Decision time for Depature:  20:12


Referrals:  


NO,LOCAL PHYSICIAN (PCP/Family)


Primary Care Physician


Patient Instructions:  Upper Back Pain (DC)





Add. Discharge Instructions:  


1.  Return to ER for any concerns


2.  Follow-up with your doctor next week 


All discharge instructions reviewed with patient and/or family. Voiced 

understanding.


Scripts


Naproxen Sodium (Anaprox Ds) 550 Mg Tablet


550 MG PO BID PRN for PAIN-MODERATE (5-7), #10 TAB


   Prov: RAMON NIETO         21 


Cyclobenzaprine HCl (Cyclobenzaprine HCl) 10 Mg Tablet


10 MG PO TID, #15 TAB


   Prov: RAMON NIETO         21


Work/School Note:  Work Release Form   Date Seen in the Emergency Department:  

May 21, 2021


   Return to Work:  May 23, 2021





Images


Torso/Trunk











1 - Tenderness














RAMON NIETO             May 21, 2021 18:46

## 2021-07-23 NOTE — DIAGNOSTIC IMAGING REPORT
EXAMINATION: Right knee radiographs, 3 views.



COMPARISON: None. 



HISTORY: 31-year-old female, right knee pain. 



FINDINGS: There is no identified acute fracture. There is no knee

joint effusion. There is no identified radiopaque foreign body.

The joint spaces are well-preserved. 



IMPRESSION: Unremarkable radiographs of the right knee. 



Dictated by: 



  Dictated on workstation # [id number]

## 2021-07-23 NOTE — ED LOWER EXTREMITY
General


Stated Complaint:  FALL/R KNEE INJ


Source:  patient





History of Present Illness


Date Seen by Provider:  2021


Time Seen by Provider:  18:30


Initial Comments


PT ARRIVES VIA POV FROM HOME


STATES EARLIER THIS AFTERNOON, SHE WAS GETTING OUT OF A TRUCK AND HER KNEE 

POPPED AND THEN IT LOCKED UP AND SHE FELL DOWN, LANDING ON THE LATERAL ASPECT OF

HER RIGHT KNEE


STATES SHE DOES HAVE  SOME NUMBNESS/TINGLING FROM THE KNEE DOWN


PT IS ABLE TO BEAR SOME WEIGHT ON RIGHT LEG


NO OTHER INJURIES FROM THE INCIDENT


NO PRIOR PROBLEMS WITH THIS KNEE





HAS NOT TAKEN ANYTHING FOR PAIN 


PT ARRIVES WEARING AN ELASTIC KNEE BRACE





HAS HAD CHRONIC PROBLEMS WITH LEFT KNEE, BUT HAS NEVER SEEN ORTHOPEDIC SURGEON 

FOR IT





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








LMP 21. NORMAL. NO BIRTH CONTROL





PCP: SHAWNA





Allergies and Home Medications


Allergies


Coded Allergies:  


     No Known Drug Allergies (Unverified , 18)





Home Medications


Cyclobenzaprine HCl 10 Mg Tablet, 10 MG PO TID


   Prescribed by: RAMON NIETO on 21


Naproxen 500 Mg Tablet.dr, 500 MG PO BID


   Prescribed by: STEPHANIE RÍOS on 21


Naproxen Sodium 550 Mg Tablet, 550 MG PO BID PRN for PAIN-MODERATE (5-7)


   Prescribed by: RAMON NIETO on 21





Patient Home Medication List


Home Medication List Reviewed:  Yes





Review of Systems


Constitutional:  no symptoms reported


Pregnant:  No


LMP:  Jul 3, 2021


Birth Control/STD Prophylaxis:  None


Musculoskeletal:  see HPI


Skin:  no symptoms reported


Psychiatric/Neurological:  See HPI





Past Medical-Social-Family Hx


Seasonal Allergies


Seasonal Allergies:  No





Past Medical History


Surgery/Hospitalization HX:  


 X 3


BIRTHMARK REMOVED


"HOLE IN UPPER PART OF MY HEART REPAIRED" IN 2014


Surgeries:  Yes


Cardiac,  Section


Respiratory:  No


Cardiac:  Yes ("HOLE IN UPPER PART OF MY HEART REPAIRED" IN 2014)


Congenital Heart Disease


Neurological:  No


Female Reproductive Disorders:  Denies


Genitourinary:  Yes


Kidney Stones


Gastrointestinal:  No


Musculoskeletal:  Yes (CHRONIC LEFT KNEE PROBLEMS--NEVER SEEN ORTHOPEDIC 

SURGEON)


Endocrine:  No


HEENT:  No


Cancer:  No


Psychosocial:  No


Integumentary:  No (BIRTHMARK REMOVED)


Blood Disorders:  No





Physical Exam


Vital Signs





Vital Signs - First Documented








 21





 18:30


 


Pulse 71


 


Resp 20


 


B/P (MAP) 108/71 (83)


 


Pulse Ox 100


 


O2 Delivery Room Air





Capillary Refill :


Height, Weight, BMI


Height: 5'4.00"


Weight: 195lbs. oz. 88.207692qg; 32.00 BMI


Method:Stated


General Appearance:  WD/WN, no apparent distress


Hips:  right hip normal inspection


Legs:  right leg normal inspection


Knees:  right knee other (TENDERNESS TO LATERAL ASPECT OF RIGHT KNEE. NO 

SWELLING OR EXTERNAL EVIDENCE OF TRAUMA. LIMITED ROM DUE TO PAIN. LIMITED EXAM 

FOR LIGAMENT LAXITY DUE TO PAIN. PARTIAL WEIGHT BEARING)


Ankles:  right ankle normal inspection


Feet:  right foot normal inspection


Neurologic/Tendon:  normal sensation, normal motor functions, normal tendon 

functions


Neurologic/Psychiatric:  CNs II-XII nml as tested, no motor/sensory deficits, 

alert, normal mood/affect, oriented x 3


Skin:  normal color, warm/dry





Procedures/Interventions


Splinting and Joint Reduction :  


   Ace wrap:  Yes


   Immobilizers:  19 inch Knee


   Ordered:  Crutches





Progress/Results/Core Measures


Results/Orders


My Orders





Orders - STEPHANIE RÍOS DO


Knee, Right, 3 Views (21 18:36)


Ace Bandage (21 19:03)


Crutches (21 19:03)


Knee Immobilizer (21 19:03)





Vital Signs/I&O











 21





 18:30


 


Pulse 71


 


Resp 20


 


B/P (MAP) 108/71 (83)


 


Pulse Ox 100


 


O2 Delivery Room Air











Diagnostic Imaging





Comments


XRAYS RIGHT KNEE--NO ACUTE PROCESS, PER RADIOLOGIST REPORT AT 1900


   Reviewed:  Reviewed by Me





Departure


Impression





   Primary Impression:  


   RIGHT KNEE SPRAIN / CONTUSION


Disposition:  01 HOME, SELF-CARE


Condition:  Stable





Departure-Patient Inst.


Decision time for Depature:  19:00


Referrals:  


[facility]


Patient Instructions:  Contusion (DC), Going Up and Down Curbs or Stairs With a 

Walker or Crutches, How to Use Crutches, How to Use an Elastic Bandage, Knee 

Immobilizer (DC), Knee Sprain (DC)





Add. Discharge Instructions:  


ICE TO AREA AT 20 MINUTE INTERVALS





ACE WRAP, KNEE IMMOBILIZER AND CRUTCHES AT ALL TIMES





ELEVATE LEG AS MUCH AS POSSIBLE





FOLLOW UP WITH [facility] IN 1 WEEK FOR FURTHER CARE


Scripts


Naproxen (Naproxen) 500 Mg Tablet.


500 MG PO BID, #20 TAB


   Prov: STEPHANIE RÍOS DO         21











STEPHANIE RÍOS DO                 2021 18:43

## 2021-11-19 NOTE — DIAGNOSTIC IMAGING REPORT
INDICATION: Contusion, pain.



COMPARISON: 04/28/2019.



TECHNIQUE: Three radiographs of the right-sided ribs dated

11/19/2021.



FINDINGS: Septal closure device is again identified overlying the

cardiac silhouette. No displaced or healing rib fracture. No

significant pleural effusion or pneumothorax. No suspicious

radiopaque foreign body.



IMPRESSION: No acute displaced or healing rib fracture. 



Dictated by: 



  Dictated on workstation # [id number]

## 2022-01-26 ENCOUNTER — HOSPITAL ENCOUNTER (EMERGENCY)
Dept: HOSPITAL 75 - ER | Age: 32
Discharge: HOME | End: 2022-01-26
Payer: MEDICAID

## 2022-01-26 VITALS — BODY MASS INDEX: 30.79 KG/M2 | HEIGHT: 63.98 IN | WEIGHT: 180.34 LBS

## 2022-01-26 VITALS — SYSTOLIC BLOOD PRESSURE: 116 MMHG | DIASTOLIC BLOOD PRESSURE: 83 MMHG

## 2022-01-26 DIAGNOSIS — R51.9: Primary | ICD-10-CM

## 2022-01-26 PROCEDURE — 99284 EMERGENCY DEPT VISIT MOD MDM: CPT

## 2022-01-26 NOTE — ED GENERAL
General


Stated Complaint:  MIGRAINE


Source of Information:  Patient


Exam Limitations:  No Limitations





History of Present Illness


Date Seen by Provider:  2022


Time Seen by Provider:  13:22


Initial Comments


To ER with a bitemporal headache that began last night alleviated by dark quiet 

room.  She has a history of migraines frequently but this 1 is worse than usual.

 No head injury, no fever no chills.  No neck pain.


Timing/Duration:  1-2 Days


Severity:  Moderate


Associated Systoms:  Headaches





Allergies and Home Medications


Allergies


Coded Allergies:  


     No Known Drug Allergies (Unverified , 18)





Patient Home Medication List


Home Medication List Reviewed:  Yes


Cyclobenzaprine HCl (Cyclobenzaprine HCl) 10 Mg Tablet, 10 MG PO TID


   Prescribed by: RAMON NIETO on 21


Naproxen (Naproxen) 500 Mg Tablet.dr, 500 MG PO BID


   Prescribed by: STEPHANIE RÍOS on 21


Naproxen Sodium (Anaprox Ds) 550 Mg Tablet, 550 MG PO BID PRN for PAIN-MODERATE 

(5-7)


   Prescribed by: RAMON NIETO on 21





Review of Systems


Review of Systems


Constitutional:  see HPI


EENTM:  see HPI


Respiratory:  no symptoms reported


Cardiovascular:  no symptoms reported


Genitourinary:  no symptoms reported


Musculoskeletal:  no symptoms reported


Skin:  no symptoms reported


Psychiatric/Neurological:  No Symptoms Reported


Hematologic/Lymphatic:  No Symptoms Reported





Past Medical-Social-Family Hx


Seasonal Allergies


Seasonal Allergies:  No





Past Medical History


Surgery/Hospitalization HX:  


 X 3


BIRTHMARK REMOVED


"HOLE IN UPPER PART OF MY HEART REPAIRED" IN 


Surgeries:  Yes


Cardiac,  Section


Respiratory:  No


Cardiac:  Yes ("HOLE IN UPPER PART OF MY HEART REPAIRED" IN )


Congenital Heart Disease


Neurological:  No


Female Reproductive Disorders:  Denies


Genitourinary:  Yes


Kidney Stones


Gastrointestinal:  No


Musculoskeletal:  Yes (CHRONIC LEFT KNEE PROBLEMS--NEVER SEEN ORTHOPEDIC 

SURGEON)


Endocrine:  No


HEENT:  No


Cancer:  No


Psychosocial:  No


Integumentary:  No (BIRTHMARK REMOVED)


Blood Disorders:  No





Physical Exam


Vital Signs


Capillary Refill :


Height, Weight, BMI


Height: 5'4.00"


Weight: 195lbs. oz. 88.056552lw; 33.00 BMI


Method:Stated


General Appearance:  No Apparent Distress, WD/WN, Other (Alert and oriented GCS 

15)


Eyes:  Bilateral Eye Normal Inspection, Bilateral Eye PERRL


HEENT:  PERRL/EOMI, TMs Normal


Neck:  Full Range of Motion, Normal Inspection


Respiratory:  No Accessory Muscle Use, No Respiratory Distress


Cardiovascular:  Regular Rate, Rhythm, Normal Peripheral Pulses


Gastrointestinal:  Normal Bowel Sounds, Non Tender, Soft


Extremity:  Normal Capillary Refill, Normal Inspection


Neurologic/Psychiatric:  Alert, Oriented x3


Skin:  Normal Color, Warm/Dry





Progress/Results/Core Measures


Suspected Sepsis


SIRS


Temperature: 


Pulse:  


Respiratory Rate: 


 


Blood Pressure  / 


Mean:





Results/Orders


My Orders





Orders - RAMON NIETO


Ketorolac Injection (Toradol Injection) (22 13:30)


Prochlorperazine Injection (Compazine In (22 13:30)


Diphenhydramine Injection (Benadryl Inje (22 13:30)





Vital Signs/I&O


Capillary Refill :





Departure


Impression





   Primary Impression:  


   Headache


Disposition:  01 HOME, SELF-CARE


Condition:  Stable





Departure-Patient Inst.


Decision time for Depature:  13:23


Referrals:  


NO,LOCAL PHYSICIAN (PCP/Family)


Primary Care Physician


Patient Instructions:  Headache, Adult ED





Add. Discharge Instructions:  


1.  Return to ER for any concerns


2.  Follow-up with your doctor next week


Work/School Note:  Work Release Form   Date Seen in the Emergency Department:  

2022


   Return to Work:  2022











RAMON NIETO             2022 13:24